# Patient Record
Sex: MALE | Race: WHITE | Employment: FULL TIME | URBAN - METROPOLITAN AREA
[De-identification: names, ages, dates, MRNs, and addresses within clinical notes are randomized per-mention and may not be internally consistent; named-entity substitution may affect disease eponyms.]

---

## 2017-03-27 ENCOUNTER — OP HISTORICAL/CONVERTED ENCOUNTER (OUTPATIENT)
Dept: OTHER | Age: 37
End: 2017-03-27

## 2017-03-28 ENCOUNTER — OFFICE VISIT (OUTPATIENT)
Dept: CARDIOLOGY CLINIC | Age: 37
End: 2017-03-28

## 2017-03-28 VITALS
DIASTOLIC BLOOD PRESSURE: 74 MMHG | SYSTOLIC BLOOD PRESSURE: 130 MMHG | RESPIRATION RATE: 16 BRPM | BODY MASS INDEX: 30.88 KG/M2 | WEIGHT: 228 LBS | HEART RATE: 94 BPM | OXYGEN SATURATION: 98 % | HEIGHT: 72 IN

## 2017-03-28 DIAGNOSIS — R00.2 PALPITATIONS: ICD-10-CM

## 2017-03-28 DIAGNOSIS — Z82.49 FAMILY HISTORY OF CORONARY ARTERY DISEASE: ICD-10-CM

## 2017-03-28 DIAGNOSIS — I49.3 PVC (PREMATURE VENTRICULAR CONTRACTION): ICD-10-CM

## 2017-03-28 DIAGNOSIS — I47.1 SVT (SUPRAVENTRICULAR TACHYCARDIA) (HCC): ICD-10-CM

## 2017-03-28 DIAGNOSIS — Z01.818 PRE-OPERATIVE CLEARANCE: Primary | ICD-10-CM

## 2017-03-28 NOTE — PROGRESS NOTES
HISTORY OF PRESENT ILLNESS  Smith Corrigan is a 43 y.o. male     SUMMARY:   Problem List  Date Reviewed: 3/28/2017          Codes Class Noted    Palpitations ICD-10-CM: R00.2  ICD-9-CM: 785.1  6/10/2016    Overview Signed 6/10/2016  8:22 AM by Clare Pemberton MD     4/16 event monitor, nsr, sinus tach, pacs, pvcs             Paroxysmal atrial fibrillation ([facility] [address] ICD-10-CM: I48.0  ICD-9-CM: 427.31  7/19/2011    Overview Addendum 4/25/2012  1:44 PM by Brandin Romero MD     7/11 converted spontaneously in [facility] er. Normal echo at that time. 4/25/2012: [facility], comprehensive EP study with 3 D mapping under conscious sedation and then general anesthesia  atrial flutter trigerring atrial fibrillation without isuprel were inducible with programmed stimulation at 1 extrastimulus, mapping showed right atrial flutter and ablation at the cavotricuspid isthmus were done. No further atrial flutter/tachycardia, fibrillation were inducible after ablation despite isuprel to wide open amount and burst rapid pacing to 2:1 AV block (240 ms), incremental and programmed atrial stimulation up to 3 extrastimuli to 200 ms at mid, high, low RA, prox CS, mid CS. Distal CS pacing did not capture. Dual AV herberth physiology is noted with one AV herberth echo beats but there was no AVNRT so this was not ablated. Atrial fibrillation ablation was not done because this was not the primary arrhythmia             SVT (supraventricular tachycardia) ICD-10-CM: I47.1  ICD-9-CM: 427.89  7/19/2011    Overview Signed 7/19/2011  4:44 PM by Clare Pemberton MD     approx 2006, with neg ep study and stress test at [facility]. Subsequently treated with inderal.             Family history of coronary artery disease ICD-10-CM: Z82.49  ICD-9-CM: V17.3  7/19/2011              Current Outpatient Prescriptions on File Prior to Visit   Medication Sig    diltiazem (CARDIZEM) 30 mg tablet Take 1 Tab by mouth four (4) times daily as needed.  As needed for palpitations.  clonazePAM (KLONOPIN) 0.5 mg tablet Take 0.5 mg by mouth nightly as needed. Taking one half tablet at night     No current facility-administered medications on file prior to visit. CARDIOLOGY STUDIES TO DATE:  2006 normal stress test at [facility]  7/11 normal echocardiogram at [facility]  5/16 event monitor pvc, pacs, sinus tachycardia      Chief Complaint   Patient presents with    Pre-op Exam     HPI :  Mr. Jing Martinez comes in for pre-op because he needs to have a laparoscopic hernia repair on Friday. EKG today is normal, and he has not had any recent palpitations. He is not taking any of his Cardizem because he has just felt like he has not needed it. Unfortunately he is still smoking. He is walking about a mile and a half just about every day, feels good about that, and he has lost about ten pounds since we saw him last spring. CARDIAC ROS:   negative for chest pain, dyspnea, palpitations, syncope, orthopnea, paroxysmal nocturnal dyspnea, exertional chest pressure/discomfort, claudication, lower extremity edema    Family History   Problem Relation Age of Onset    Heart Disease Father        Past Medical History:   Diagnosis Date    Atrial fibrillation ([facility] [address]     Hernia of unspecified site of abdominal cavity without mention of obstruction or gangrene     repaired    Lipoma     Status post Nissen fundoplication (without gastrostomy tube) procedure        GENERAL ROS:  A comprehensive review of systems was negative except for that written in the HPI.     Visit Vitals    /74 (BP 1 Location: Right arm, BP Patient Position: Sitting)    Pulse 94    Resp 16    Ht 6' (1.829 m)    Wt 228 lb (103.4 kg)    SpO2 98%    BMI 30.92 kg/m2       Wt Readings from Last 3 Encounters:   03/28/17 228 lb (103.4 kg)   06/10/16 236 lb 6.4 oz (107.2 kg)   04/25/16 238 lb (108 kg)            BP Readings from Last 3 Encounters:   03/28/17 130/74   06/10/16 130/80   04/25/16 120/80 PHYSICAL EXAM  General appearance: alert, cooperative, no distress, appears stated age  Neck: supple, symmetrical, trachea midline, no adenopathy, no carotid bruit and no JVD  Lungs: clear to auscultation bilaterally  Heart: regular rate and rhythm, S1, S2 normal, no murmur, click, rub or gallop  Extremities: extremities normal, atraumatic, no cyanosis or edema      ASSESSMENT  Mr. Emre Goel is stable and asymptomatic at this point and should be fine for hernia surgery without special precautions or further cardiac testing. current treatment plan is effective, no change in therapy  lab results and schedule of future lab studies reviewed with patient  reviewed diet, exercise and weight control    Encounter Diagnoses   Name Primary?  Pre-operative clearance Yes    SVT (supraventricular tachycardia)     Palpitations     Family history of coronary artery disease     PVC (premature ventricular contraction)      Orders Placed This Encounter    AMB POC EKG ROUTINE W/ 12 LEADS, INTER & REP       Follow-up Disposition:  Return if symptoms worsen or fail to improve.     Indra Heard MD  3/28/2017

## 2017-03-30 ENCOUNTER — TELEPHONE (OUTPATIENT)
Dept: CARDIOLOGY CLINIC | Age: 37
End: 2017-03-30

## 2017-03-30 NOTE — TELEPHONE ENCOUNTER
Patient's wife walked in requesting patient's surgical clearance. Verified HIPAA. Verified patient's identity with two identifiers. Printed office note, which included the clearance. She denied further questions or concerns.

## 2017-04-13 ENCOUNTER — TELEPHONE (OUTPATIENT)
Dept: CARDIOLOGY CLINIC | Age: 37
End: 2017-04-13

## 2017-04-13 NOTE — TELEPHONE ENCOUNTER
I called # below and they said I have to fax records request to fax # [phone]. I will give them to you once I receive them. Records given to Dr. Lay Ghotra nurse, [employer].

## 2017-04-13 NOTE — TELEPHONE ENCOUNTER
Patient was seen in at [facility]. He had an EKG, ECHO and Stress Test. His wife Mikaela Phillips  stated that she has tried multiple times with no luck. She would like to know if you can call them at  [phone] to get records.  If you need to speak with her she can be reached at [phone]

## 2017-04-13 NOTE — TELEPHONE ENCOUNTER
I sent a message to Imtiaz Edmonds asking if she could pull records. Patient is a patient of Dr. Cyn Ricks and has an appointment with Dr. oNrma Foster tomorrow.     Future Appointments  Date Time Provider Farhat Taisha   4/14/2017 11:20 AM Gareth Lim  [address]

## 2017-04-14 ENCOUNTER — OFFICE VISIT (OUTPATIENT)
Dept: CARDIOLOGY CLINIC | Age: 37
End: 2017-04-14

## 2017-04-14 VITALS
SYSTOLIC BLOOD PRESSURE: 110 MMHG | OXYGEN SATURATION: 96 % | HEIGHT: 72 IN | BODY MASS INDEX: 30.61 KG/M2 | RESPIRATION RATE: 16 BRPM | DIASTOLIC BLOOD PRESSURE: 80 MMHG | HEART RATE: 92 BPM | WEIGHT: 226 LBS

## 2017-04-14 DIAGNOSIS — R00.2 PALPITATIONS: ICD-10-CM

## 2017-04-14 DIAGNOSIS — Z01.812 PRE-PROCEDURE LAB EXAM: ICD-10-CM

## 2017-04-14 DIAGNOSIS — I49.3 PVC (PREMATURE VENTRICULAR CONTRACTION): Primary | ICD-10-CM

## 2017-04-14 RX ORDER — RANITIDINE HCL 75 MG
150 TABLET ORAL 2 TIMES DAILY
COMMUNITY
End: 2018-04-19

## 2017-04-14 NOTE — PATIENT INSTRUCTIONS
Your EP study and PVC ablation procedure has been scheduled for 6/23/17 at 1:00pm, at [facility].    Please report to Admitting Department by 11:00am, or 2 hours prior to your scheduled procedure. Please bring a list of your current medications and medication bottles, if able, to the hospital on this day. You will need to have nothing to eat or drink after midnight, the night prior to your procedure. You may have small sips of water, if needed, to take with your medication. You will need labs drawn prior to your procedure. Please go to [facility] to have this done no sooner than 5/23/17 and no later than 6/19/17. You should not stop your medications prior to your scheduled procedure. After your procedure, you will need to follow up with Dr. Sonia Toribio.  Your follow-up appointment has been scheduled for 7/13/17 at 2:20pm.

## 2017-04-14 NOTE — PROGRESS NOTES
Chief Complaint   Patient presents with    Irregular Heart Beat     Follow up due to recent hospital visit. No further chest pain/shortness of breath. Denies dizziness/swelling.  SVT     Recent ER visit due to chest pain/shortness of breath/elevated heart rate. Discharged on zantac bid. Has not smoked since 4-11-17.

## 2017-04-14 NOTE — MR AVS SNAPSHOT
Visit Information Date & Time Provider Department Dept. Phone Encounter #  
 4/14/2017 11:20 AM Kiana Mendoza MD [facility] [phone] [id number] Your Appointments 7/13/2017  2:20 PM  
ESTABLISHED PATIENT with Kiana Mendoza MD  
[facility] ([facility]) Appt Note: 2 week PVC ablation/EKG needed [address] [place] [address]  
[address] [address] Upcoming Health Maintenance Date Due Pneumococcal 19-64 Medium Risk (1 of 1 - PPSV23) 12/29/1997 DTaP/Tdap/Td series (1 - Tdap) 12/29/1999 INFLUENZA AGE 9 TO ADULT 8/1/2016 Allergies as of 4/14/2017  Review Complete On: 4/14/2017 By: Kiana Mendoza MD  
 No Known Allergies Current Immunizations  Reviewed on 4/26/2012 No immunizations on file. Not reviewed this visit You Were Diagnosed With   
  
 Codes Comments SVT (supraventricular tachycardia) ([facility]    -  Primary ICD-10-CM: I47.1 ICD-9-CM: 427.89 Pre-procedure lab exam     ICD-10-CM: D12.055 ICD-9-CM: V72.63 Vitals BP Pulse Resp Height(growth percentile) Weight(growth percentile) SpO2  
 110/80 (BP 1 Location: Right arm, BP Patient Position: Sitting) 92 16 6' (1.829 m) 226 lb (102.5 kg) 96% BMI Smoking Status 30.65 kg/m2 Former Smoker Vitals History BMI and BSA Data Body Mass Index Body Surface Area  
 30.65 kg/m 2 2.28 m 2 Preferred Pharmacy Pharmacy Name Phone [address] [phone] Your Updated Medication List  
  
   
This list is accurate as of: 4/14/17 12:17 PM.  Always use your most recent med list.  
  
  
  
  
 dilTIAZem 30 mg tablet Commonly known as:  CARDIZEM Take 1 Tab by mouth four (4) times daily as needed. As needed for palpitations. KlonoPIN 0.5 mg tablet Generic drug:  clonazePAM  
Take 0.5 mg by mouth nightly as needed. Taking one half tablet at night. As of 4-14-17, taking every am.  
  
 raNITIdine 75 mg tablet Commonly known as:  ZANTAC Take 75 mg by mouth two (2) times a day. We Performed the Following CBC WITH AUTOMATED DIFF [12124 CPT(R)] METABOLIC PANEL, BASIC [28230 CPT(R)] Patient Instructions Your EP study and PVC ablation procedure has been scheduled for 6/23/17 at 1:00pm, at [address]. 
 
Please report to Admitting Department by 11:00am, or 2 hours prior to your scheduled procedure. Please bring a list of your current medications and medication bottles, if able, to the hospital on this day. You will need to have nothing to eat or drink after midnight, the night prior to your procedure. You may have small sips of water, if needed, to take with your medication. You will need labs drawn prior to your procedure. Please go to [facility] to have this done no sooner than 5/23/17 and no later than 6/19/17. You should not stop your medications prior to your scheduled procedure. After your procedure, you will need to follow up with Dr. Hedda Baumgarten. Your follow-up appointment has been scheduled for 7/13/17 at 2:20pm.  
 
 
 
  
Introducing [facility] SERVICES! Dear Rashaun Roger: Thank you for requesting a [employer] account. Our records indicate that you already have an active Kindling account. You can access your account anytime at [email or web address] Did you know that you can access your hospital and ER discharge instructions at any time in Kindling? You can also review all of your test results from your hospital stay or ER visit. Additional Information If you have questions, please visit the Frequently Asked Questions section of the Kindling website at [email or web address] Remember, Kindling is NOT to be used for urgent needs. For medical emergencies, dial 911. Now available from your iPhone and Android! Please provide this summary of care documentation to your next provider. Your primary care clinician is listed as Kathleene Lanes. If you have any questions after today's visit, please call [phone].

## 2017-04-14 NOTE — LETTER
NOTIFICATION OF RETURN TO WORK / SCHOOL 
 
4/14/2017 12:05 PM 
 
Mr. Inocencia Lowry [address] [address] [place] To Whom It May Concern: 
 
Inocencia Lowry was under the care of [address] He will be able to return to work/school with regular duties and/or activities . If there are questions or concerns please have the patient contact our office.  
 
Sincerely, 
 
 
Corinne Perez MD

## 2017-04-15 NOTE — PROGRESS NOTES
CARDIAC ELECTROPHYSIOLOGY CLINIC    Subjective:      Kamla Guido is a 43 y.o. male who is seen for evaluation of palpitation and PVC  He had loop monitor 4/2016 with PAC and PVC  He had been seen in August 2014  He had rare PVC in the past  Today he said he had felt so good with atrial flutter ablation and did not have problem until PVCs started to bother him  When he went to outside ER and said he saw ventricular bigeminy    He had been using Inderal back in 2005 when he had tachycardia and was studied in EP lab at [facility] and was told there was nothing inducible  He then came to [facility] with AFIB and ECG confirmed that in 7/2011 and echo there showed normal heart with possible mild LAE  He had GERD and 2 surgeries so he did EP study with me  4/25/2012 EP and ablation  1. There was no retrograde VA conduction, no evidence of accessory pathway  2. Normal AV herberth antegrade conduction  3. Dual AV herberth physiology with AV herberth echo single beat was present but there was no AVNRT so this was not necessary to ablate  4. Atrial flutter was present and triggered atrial fibrillation. Mapping showed counterclockwise cavotricuspid isthmus atrial flutter. The isthmus was ablated and bidirectional line of block was obtained.  There was no further inducible arrhythmia (atrial tachycardia, flutter or fibrillation) obtained after ablation despite aggressive pacing protocol (at multiple RA and LA via CS sites) with and without isuprel up to the highest allowable dose, with and without anesthetic agent/sedation      Problem List  Date Reviewed: 4/14/2017          Codes Class Noted    Palpitations ICD-10-CM: R00.2  ICD-9-CM: 785.1  6/10/2016    Overview Signed 6/10/2016  8:22 AM by Ankita Pedro MD     4/16 event monitor, nsr, sinus tach, pacs, pvcs             Paroxysmal [facility] fibrillation ([facility] ICD-10-CM: I48.0  ICD-9-CM: 427.31  7/19/2011    Overview Addendum 4/25/2012  1:44 PM by Joanna Huff MD     7/11 converted spontaneously in [facility] er. Normal echo at that time. 4/25/2012: [facility], comprehensive EP study with 3 D mapping under conscious sedation and then general anesthesia  atrial flutter trigerring atrial fibrillation without isuprel were inducible with programmed stimulation at 1 extrastimulus, mapping showed right atrial flutter and ablation at the cavotricuspid isthmus were done. No further atrial flutter/tachycardia, fibrillation were inducible after ablation despite isuprel to wide open amount and burst rapid pacing to 2:1 AV block (240 ms), incremental and programmed atrial stimulation up to 3 extrastimuli to 200 ms at mid, high, low RA, prox CS, mid CS. Distal CS pacing did not capture. Dual AV herberth physiology is noted with one AV herberth echo beats but there was no AVNRT so this was not ablated. Atrial fibrillation ablation was not done because this was not the primary arrhythmia             SVT (supraventricular tachycardia) ([facility] [address] ICD-10-CM: I47.1  ICD-9-CM: 427.89  7/19/2011    Overview Signed 7/19/2011  4:44 PM by Ankita Pedro MD     approx 2006, with neg ep study and stress test at [facility]. Subsequently treated with inderal.             Family history of coronary artery disease ICD-10-CM: Z82.49  ICD-9-CM: V17.3  7/19/2011              Current Outpatient Prescriptions   Medication Sig Dispense Refill    raNITIdine (ZANTAC) 75 mg tablet Take 75 mg by mouth two (2) times a day.  diltiazem (CARDIZEM) 30 mg tablet Take 1 Tab by mouth four (4) times daily as needed. As needed for palpitations. 360 Tab 0    clonazePAM (KLONOPIN) 0.5 mg tablet Take 0.5 mg by mouth nightly as needed. Taking one half tablet at night.   As of 4-14-17, taking every am.       No Known Allergies  Past Medical History:   Diagnosis Date    Atrial fibrillation ([facility] [address]     Hernia of unspecified site of abdominal cavity without mention of obstruction or gangrene     repaired    Lipoma     Status post Nissen fundoplication (without gastrostomy tube) procedure      Past Surgical History:   Procedure Laterality Date    ABLATION OF SVT  4/25/2012         EP STUDY W/INDUCTION  2005    at [facility]    EP STUDY W/INDUCTION  4/25/2012         ISOPROTERENOL NON-COMP CON  4/25/2012         CT INTRACARDIAC ELECTROPHYSIOLOGIC 3D MAPPING  4/25/2012          No family history of AFIB or arrhythmia, just CAD    Social History   Substance Use Topics    Smoking status: Former Smoker     Packs/day: 0.50     Years: 0.00    Smokeless tobacco: Never Used    Alcohol use No      Review of Systems    Constitutional: Negative for fever, chills, weight loss  HEENT: Negative for nosebleeds,vision changes. Respiratory: Negative for cough, hemoptysis, sputum production, and wheezing. Cardiovascular: Negative for chest pain, orthopnea, claudication, leg swelling, syncope, and PND. Gastrointestinal: Negative for nausea, vomiting, diarrhea, blood in stool and melena. Genitourinary: Negative for dysuria, urgency, frequency and hematuria. Musculoskeletal: Negative for myalgias. Skin: Negative for rash and itching. Heme: Does not bleed or bruise easily. Neurological: Negative for speech change and focal weakness      Objective:     Visit Vitals    /80 (BP 1 Location: Right arm, BP Patient Position: Sitting)    Pulse 92    Resp 16    Ht 6' (1.829 m)    Wt 226 lb (102.5 kg)    SpO2 96%    BMI 30.65 kg/m2      Physical Exam:   General:  alert, cooperative, no distress, appears stated age   Neck: no bruits, no JVD   Chest Wall: respiratory effort normal   Lung: clear to auscultation bilaterally   Heart:  normal rate, regular rhythm, no murmurs, rubs, clicks or gallops   Abdomen:  soft, non-tender   Extremities: No edema       Assessment/Plan:       ICD-10-CM ICD-9-CM    1. PVC (premature ventricular contraction) I49.3 427.69    2.  Pre-procedure lab exam Z01.812 V72.63 CBC WITH AUTOMATED DIFF      METABOLIC PANEL, BASIC 3. Palpitations R00.2 785.1      He had rare PVC on loop monitor in the past  He was recommended to use the cardizem prn for episodes of palpitations. He agrees with plan and did not use cardizem at all but recently he had more frequent episodes of palpitations and went to another hospital and knew he had more frequent PVCs  He showed me normal echo and nuclear stress test and during exercise stress part he had inducible PVCs mentioned in the report  I discussed with him medications again but he refused  He said medications slow him down and prefer benefits of ablation over the risk  He wants to proceed with EP study and ablation of PVC  I discussed with him and his wife about possibility that despite he feels more PVCs lately I cannot induce during procedure and therefore cannot ablate  He is ok with that  Risks include but are not limited to bleeding in the groin, infection in the groin and/or heart valves, fistula between the groin arteries and veins, valvular damage, diaphragmatic paralysis, aortic dissection, heart attack, stroke, blood clot in the leg, pulmonary embolism, lung collapse (pneumothorax or hemothorax), heart collapse (pericardial tamponade), death. The added risks for left sided ablation may be kidney failure (from contrast injection), higher risk of bleeding, stroke and heart attack. Elective or emergency surgery may be required to repair some of these complications. Prolonged hospitalization would be required. General anesthesia and transeptal catheterization may be required for the procedure       Cara Salomon M.D.  [facility]  Electrophysiology/Cardiology  [facility]  [address] [facility], [place] Janelle [address] [place] [address]  [phone]

## 2017-05-24 ENCOUNTER — HOSPITAL ENCOUNTER (EMERGENCY)
Age: 37
Discharge: HOME | End: 2017-05-24
Attending: STUDENT IN AN ORGANIZED HEALTH CARE EDUCATION/TRAINING PROGRAM
Payer: COMMERCIAL

## 2017-05-24 VITALS
WEIGHT: 224 LBS | SYSTOLIC BLOOD PRESSURE: 133 MMHG | HEART RATE: 71 BPM | DIASTOLIC BLOOD PRESSURE: 82 MMHG | OXYGEN SATURATION: 99 % | BODY MASS INDEX: 30.34 KG/M2 | HEIGHT: 72 IN | TEMPERATURE: 98.4 F | RESPIRATION RATE: 17 BRPM

## 2017-05-24 DIAGNOSIS — R00.2 PALPITATIONS: Primary | ICD-10-CM

## 2017-05-24 LAB
ATRIAL RATE: 78 BPM
CALCULATED P AXIS, ECG09: 45 DEGREES
CALCULATED R AXIS, ECG10: 11 DEGREES
CALCULATED T AXIS, ECG11: 17 DEGREES
DIAGNOSIS, 93000: NORMAL
P-R INTERVAL, ECG05: 144 MS
Q-T INTERVAL, ECG07: 380 MS
QRS DURATION, ECG06: 94 MS
QTC CALCULATION (BEZET), ECG08: 433 MS
VENTRICULAR RATE, ECG03: 78 BPM

## 2017-05-24 PROCEDURE — 99284 EMERGENCY DEPT VISIT MOD MDM: CPT

## 2017-05-24 PROCEDURE — 93005 ELECTROCARDIOGRAM TRACING: CPT

## 2017-05-24 NOTE — ED NOTES
Patient calling out stating that he \"feels the palpitations\" at this time. Rhythm strip printed that shows PAC. Patient aware of finding; aware that the strip was printed. Patient updated regarding plan of care; verbalizes understanding and agreement. Patient attached to monitor x4; tolerating well. Vital signs updated. Call bell in reach. Family at bedside. Will continue to monitor.

## 2017-05-24 NOTE — DISCHARGE INSTRUCTIONS
We hope that we have addressed all of your medical concerns. The examination and treatment you received in the Emergency Department were for an emergent problem and were not intended as complete care. It is important that you follow up with your healthcare provider(s) for ongoing care. If your symptoms worsen or do not improve as expected, and you are unable to reach your usual health care provider(s), you should return to the Emergency Department. Today's healthcare is undergoing tremendous change, and patient satisfaction surveys are one of the many tools to assess the quality of medical care. You may receive a survey from the [employer] regarding your experience in the Emergency Department. I hope that your experience has been completely positive, particularly the medical care that I provided. As such, please participate in the survey; anything less than excellent does not meet my expectations or intentions. [facility] and [address] [place] [address] participate in nationally recognized quality of care measures. If your blood pressure is greater than 120/80, as reported below, we urge that you seek medical care to address the potential of high blood pressure, commonly known as hypertension. Hypertension can be hereditary or can be caused by certain medical conditions, pain, stress, or \"white coat syndrome. \"       Please make an appointment with your health care provider(s) for follow up of your Emergency Department visit. VITALS:   Patient Vitals for the past 8 hrs:   Temp Pulse Resp BP SpO2   05/24/17 1400 98.4 °F (36.9 °C) 74 15 124/84 100 %          Thank you for allowing us to provide you with medical care today. We realize that you have many choices for your emergency care needs. Please choose us in the future for any continued health care needs. Mitali Leal, [address] [facility].   Office: [phone]            No results found for this or any previous visit (from the past 24 hour(s)). No results found. Palpitations: Care Instructions  Your Care Instructions    Heart palpitations are the uncomfortable sensation that your heart is beating fast or irregularly. You might feel pounding or fluttering in your chest. It might feel like your heart is skipping a beat. Although palpitations may be caused by a heart problem, they also occur because of stress, fatigue, or use of alcohol, caffeine, or nicotine. Many medicines, including diet pills, antihistamines, decongestants, and some herbal products, can cause heart palpitations. Nearly everyone has palpitations from time to time. Depending on your symptoms, your doctor may need to do more tests to try to find the cause of your palpitations. Follow-up care is a key part of your treatment and safety. Be sure to make and go to all appointments, and call your doctor if you are having problems. It's also a good idea to know your test results and keep a list of the medicines you take. How can you care for yourself at home? · Avoid caffeine, nicotine, and excess alcohol. · Do not take illegal drugs, such as methamphetamines and cocaine. · Do not take weight loss or diet medicines unless you talk with your doctor first.  · Get plenty of sleep. · Do not overeat. · If you have palpitations again, take deep breaths and try to relax. This may slow a racing heart. · If you start to feel lightheaded, lie down to avoid injuries that might result if you pass out and fall down. · Keep a record of your palpitations and bring it to your next doctor's appointment. Write down:  ¨ The date and time. ¨ Your pulse. (If your heart is beating fast, it may be hard to count your pulse.)  ¨ What you were doing when the palpitations started. ¨ How long the palpitations lasted. ¨ Any other symptoms. · If an activity causes palpitations, slow down or stop.  Talk to your doctor before you do that activity again. · Take your medicines exactly as prescribed. Call your doctor if you think you are having a problem with your medicine. When should you call for help? Call 911 anytime you think you may need emergency care. For example, call if:  · You passed out (lost consciousness). · You have symptoms of a heart attack. These may include:  ¨ Chest pain or pressure, or a strange feeling in the chest.  ¨ Sweating. ¨ Shortness of breath. ¨ Pain, pressure, or a strange feeling in the back, neck, jaw, or upper belly or in one or both shoulders or arms. ¨ Lightheadedness or sudden weakness. ¨ A fast or irregular heartbeat. After you call 911, the  may tell you to chew 1 adult-strength or 2 to 4 low-dose aspirin. Wait for an ambulance. Do not try to drive yourself. · You have symptoms of a stroke. These may include:  ¨ Sudden numbness, tingling, weakness, or loss of movement in your face, arm, or leg, especially on only one side of your body. ¨ Sudden vision changes. ¨ Sudden trouble speaking. ¨ Sudden confusion or trouble understanding simple statements. ¨ Sudden problems with walking or balance. ¨ A sudden, severe headache that is different from past headaches. Call your doctor now or seek immediate medical care if:  · You have heart palpitations and:  ¨ Are dizzy or lightheaded, or you feel like you may faint. ¨ Have new or increased shortness of breath. Watch closely for changes in your health, and be sure to contact your doctor if:  · You continue to have heart palpitations. Where can you learn more? Go to http://barbara-silvestre.info/. Enter R508 in the search box to learn more about \"Palpitations: Care Instructions. \"  Current as of: January 27, 2016  Content Version: 11.2  © 0208-8675 [employer]. Care instructions adapted under license by [employer] (which disclaims liability or warranty for this information).  If you have questions about a medical condition or this instruction, always ask your healthcare professional. [place] [address] any warranty or liability for your use of this information.

## 2017-05-24 NOTE — ED NOTES
The patient was discharged home by Dr. Jessie Mason in stable condition. The patient is alert and oriented, in no respiratory distress and discharge vital signs obtained. The patient's diagnosis, condition and treatment were explained. The patient expressed understanding. No prescriptions given. No work/school note given. A discharge plan has been developed. A  was not involved in the process. Aftercare instructions were given. Pt ambulatory out of the ED with spouse.

## 2017-05-24 NOTE — ED PROVIDER NOTES
HPI Comments: Patient is a 58-year-old male with a history of cardiac ablation in 2012 due to a flutter in A. fib, PVCs, PACs, on p.r.n. diltiazem who presents to the ED with a chief complaint of heart palpitations. It is here in [place] doing preop blood work for a scheduled EP study on June 23, 2017. He states last night he had an episode of palpitations and these have continued today, worsened and exacerbated by any type of movement or exertion. He is asymptomatic when at rest. He took one dose of diltiazem at 12:30 PM today. He denies chest pain, shortness of breath, fever, nausea, vomiting, abdominal pain. No other complaints at this time. The history is provided by the patient. No  was used. Past Medical History:   Diagnosis Date    Atrial fibrillation (HCC)     Hernia of unspecified site of abdominal cavity without mention of obstruction or gangrene     repaired    Lipoma     Status post Nissen fundoplication (without gastrostomy tube) procedure        Past Surgical History:   Procedure Laterality Date    ABLATION OF SVT  4/25/2012         EP ST[facility]    EP STUDY W/INDUCTION  4/25/2012         HX HERNIA REPAIR      ISOPROTERENOL NON-COMP CON  4/25/2012         HI INTRACARDIAC ELECTROPHYSIOLOGIC 3D MAPPING  4/25/2012              Family History:   Problem Relation Age of Onset    Heart Disease Father        Social History     Social History    Marital status:      Spouse name: N/A    Number of children: N/A    Years of education: N/A     Occupational History    Not on file. Social History Main Topics    Smoking status: Former Smoker     Packs/day: 0.50     Years: 0.00    Smokeless tobacco: Never Used    Alcohol use No    Drug use: No    Sexual activity: Not on file     Other Topics Concern    Not on file     Social History Narrative         ALLERGIES: Review of patient's allergies indicates no known allergies.     Review of Systems   Respiratory: Negative for shortness of breath. Cardiovascular: Positive for palpitations. Negative for chest pain and leg swelling. All other systems reviewed and are negative. Vitals:    05/24/17 1400   BP: 124/84   Pulse: 74   Resp: 15   Temp: 98.4 °F (36.9 °C)   SpO2: 100%   Weight: 101.6 kg (224 lb)   Height: 6' (1.829 m)            Physical Exam   Constitutional: He is oriented to person, place, and time. He appears well-developed and well-nourished. No distress. HENT:   Head: Normocephalic and atraumatic. Eyes: Conjunctivae and EOM are normal.   Neck: Normal range of motion. Cardiovascular: Normal rate and normal heart sounds. No murmur heard. Pulmonary/Chest: Effort normal and breath sounds normal. No respiratory distress. He has no wheezes. Abdominal: Soft. Bowel sounds are normal. He exhibits no distension. There is no tenderness. There is no rebound. Musculoskeletal: Normal range of motion. He exhibits no edema or tenderness. Neurological: He is alert and oriented to person, place, and time. No cranial nerve deficit. He exhibits normal muscle tone. Coordination normal.   Skin: Skin is warm and dry. He is not diaphoretic. Nursing note and vitals reviewed. [facility]  ED Course       Procedures    EKG at 1357 shows normal sinus rhythm with sinus arrhythmia, heart rate 78 beats per minute, no STEMI, no ischemia, normal axis, normal intervals.

## 2017-05-25 LAB
BASOPHILS # BLD AUTO: 0 X10E3/UL (ref 0–0.2)
BASOPHILS NFR BLD AUTO: 1 %
BUN SERPL-MCNC: 10 MG/DL (ref 6–20)
BUN/CREAT SERPL: 10 (ref 9–20)
CALCIUM SERPL-MCNC: 9.3 MG/DL (ref 8.7–10.2)
CHLORIDE SERPL-SCNC: 100 MMOL/L (ref 96–106)
CO2 SERPL-SCNC: 23 MMOL/L (ref 18–29)
CREAT SERPL-MCNC: 0.99 MG/DL (ref 0.76–1.27)
EOSINOPHIL # BLD AUTO: 0.1 X10E3/UL (ref 0–0.4)
EOSINOPHIL NFR BLD AUTO: 2 %
ERYTHROCYTE [DISTWIDTH] IN BLOOD BY AUTOMATED COUNT: 13.2 % (ref 12.3–15.4)
GLUCOSE SERPL-MCNC: 207 MG/DL (ref 65–99)
HCT VFR BLD AUTO: 46 % (ref 37.5–51)
HGB BLD-MCNC: 15.8 G/DL (ref 12.6–17.7)
IMM GRANULOCYTES # BLD: 0.1 X10E3/UL (ref 0–0.1)
IMM GRANULOCYTES NFR BLD: 1 %
LYMPHOCYTES # BLD AUTO: 2.3 X10E3/UL (ref 0.7–3.1)
LYMPHOCYTES NFR BLD AUTO: 29 %
MCH RBC QN AUTO: 31.1 PG (ref 26.6–33)
MCHC RBC AUTO-ENTMCNC: 34.3 G/DL (ref 31.5–35.7)
MCV RBC AUTO: 91 FL (ref 79–97)
MONOCYTES # BLD AUTO: 0.3 X10E3/UL (ref 0.1–0.9)
MONOCYTES NFR BLD AUTO: 4 %
NEUTROPHILS # BLD AUTO: 5.1 X10E3/UL (ref 1.4–7)
NEUTROPHILS NFR BLD AUTO: 63 %
PLATELET # BLD AUTO: 242 X10E3/UL (ref 150–379)
POTASSIUM SERPL-SCNC: 4.7 MMOL/L (ref 3.5–5.2)
RBC # BLD AUTO: 5.08 X10E6/UL (ref 4.14–5.8)
SODIUM SERPL-SCNC: 140 MMOL/L (ref 134–144)
WBC # BLD AUTO: 8 X10E3/UL (ref 3.4–10.8)

## 2017-06-16 RX ORDER — SODIUM CHLORIDE 0.9 % (FLUSH) 0.9 %
5-10 SYRINGE (ML) INJECTION AS NEEDED
Status: CANCELLED | OUTPATIENT
Start: 2017-06-16

## 2017-06-16 RX ORDER — SODIUM CHLORIDE 0.9 % (FLUSH) 0.9 %
5-10 SYRINGE (ML) INJECTION EVERY 8 HOURS
Status: CANCELLED | OUTPATIENT
Start: 2017-06-16

## 2017-06-23 ENCOUNTER — HOSPITAL ENCOUNTER (OUTPATIENT)
Dept: NON INVASIVE DIAGNOSTICS | Age: 37
Setting detail: OBSERVATION
Discharge: HOME | End: 2017-06-24
Attending: INTERNAL MEDICINE | Admitting: INTERNAL MEDICINE
Payer: COMMERCIAL

## 2017-06-23 PROCEDURE — 74011000250 HC RX REV CODE- 250: Performed by: INTERNAL MEDICINE

## 2017-06-23 PROCEDURE — 93613 INTRACARDIAC EPHYS 3D MAPG: CPT

## 2017-06-23 PROCEDURE — 77030018729 HC ELECTRD DEFIB PAD CARD -B

## 2017-06-23 PROCEDURE — 74011250637 HC RX REV CODE- 250/637: Performed by: NURSE PRACTITIONER

## 2017-06-23 PROCEDURE — 74011250636 HC RX REV CODE- 250/636: Performed by: INTERNAL MEDICINE

## 2017-06-23 PROCEDURE — 99218 HC RM OBSERVATION: CPT

## 2017-06-23 PROCEDURE — C1894 INTRO/SHEATH, NON-LASER: HCPCS

## 2017-06-23 PROCEDURE — 77030030806 HC PTCH ENSIT NAVX STJU -G

## 2017-06-23 PROCEDURE — 77030016894 HC CBL EP DX CATH3 STJU -B

## 2017-06-23 PROCEDURE — 77030016899 HC CBL EP EXT4 BSC -B

## 2017-06-23 PROCEDURE — C1730 CATH, EP, 19 OR FEW ELECT: HCPCS

## 2017-06-23 PROCEDURE — 77030010869 HC CBL EP ABL STJU -B

## 2017-06-23 PROCEDURE — 74011250637 HC RX REV CODE- 250/637: Performed by: INTERNAL MEDICINE

## 2017-06-23 PROCEDURE — 99152 MOD SED SAME PHYS/QHP 5/>YRS: CPT

## 2017-06-23 PROCEDURE — 99153 MOD SED SAME PHYS/QHP EA: CPT

## 2017-06-23 PROCEDURE — 93620 COMP EP EVL R AT VEN PAC&REC: CPT

## 2017-06-23 RX ORDER — ACETAMINOPHEN 325 MG/1
650 TABLET ORAL
Status: DISCONTINUED | OUTPATIENT
Start: 2017-06-23 | End: 2017-06-24 | Stop reason: HOSPADM

## 2017-06-23 RX ORDER — DIPHENHYDRAMINE HYDROCHLORIDE 50 MG/ML
50 INJECTION, SOLUTION INTRAMUSCULAR; INTRAVENOUS ONCE
Status: COMPLETED | OUTPATIENT
Start: 2017-06-23 | End: 2017-06-23

## 2017-06-23 RX ORDER — SODIUM CHLORIDE 0.9 % (FLUSH) 0.9 %
5 SYRINGE (ML) INJECTION AS NEEDED
Status: DISCONTINUED | OUTPATIENT
Start: 2017-06-23 | End: 2017-06-24 | Stop reason: HOSPADM

## 2017-06-23 RX ORDER — SODIUM CHLORIDE 0.9 % (FLUSH) 0.9 %
5-10 SYRINGE (ML) INJECTION EVERY 8 HOURS
Status: DISCONTINUED | OUTPATIENT
Start: 2017-06-23 | End: 2017-06-24

## 2017-06-23 RX ORDER — FENTANYL CITRATE 50 UG/ML
25-200 INJECTION, SOLUTION INTRAMUSCULAR; INTRAVENOUS
Status: DISCONTINUED | OUTPATIENT
Start: 2017-06-23 | End: 2017-06-23

## 2017-06-23 RX ORDER — MIDAZOLAM HYDROCHLORIDE 1 MG/ML
.5-1 INJECTION, SOLUTION INTRAMUSCULAR; INTRAVENOUS
Status: DISCONTINUED | OUTPATIENT
Start: 2017-06-23 | End: 2017-06-23

## 2017-06-23 RX ORDER — SODIUM CHLORIDE 0.9 % (FLUSH) 0.9 %
5-10 SYRINGE (ML) INJECTION AS NEEDED
Status: DISCONTINUED | OUTPATIENT
Start: 2017-06-23 | End: 2017-06-24 | Stop reason: HOSPADM

## 2017-06-23 RX ORDER — METOPROLOL SUCCINATE 50 MG/1
50 TABLET, EXTENDED RELEASE ORAL DAILY
Status: DISCONTINUED | OUTPATIENT
Start: 2017-06-23 | End: 2017-06-24 | Stop reason: HOSPADM

## 2017-06-23 RX ORDER — SODIUM CHLORIDE 0.9 % (FLUSH) 0.9 %
5-10 SYRINGE (ML) INJECTION EVERY 8 HOURS
Status: DISCONTINUED | OUTPATIENT
Start: 2017-06-23 | End: 2017-06-24 | Stop reason: HOSPADM

## 2017-06-23 RX ORDER — HEPARIN SODIUM 1000 [USP'U]/ML
5000 INJECTION, SOLUTION INTRAVENOUS; SUBCUTANEOUS
Status: DISCONTINUED | OUTPATIENT
Start: 2017-06-23 | End: 2017-06-23

## 2017-06-23 RX ORDER — ATROPINE SULFATE 0.1 MG/ML
0.5 INJECTION INTRAVENOUS AS NEEDED
Status: DISCONTINUED | OUTPATIENT
Start: 2017-06-23 | End: 2017-06-23

## 2017-06-23 RX ORDER — ADENOSINE 3 MG/ML
6-24 INJECTION, SOLUTION INTRAVENOUS
Status: DISCONTINUED | OUTPATIENT
Start: 2017-06-23 | End: 2017-06-23

## 2017-06-23 RX ORDER — NALOXONE HYDROCHLORIDE 0.4 MG/ML
0.4 INJECTION, SOLUTION INTRAMUSCULAR; INTRAVENOUS; SUBCUTANEOUS AS NEEDED
Status: DISCONTINUED | OUTPATIENT
Start: 2017-06-23 | End: 2017-06-24 | Stop reason: HOSPADM

## 2017-06-23 RX ORDER — FAMOTIDINE 20 MG/1
20 TABLET, FILM COATED ORAL 2 TIMES DAILY
Status: DISCONTINUED | OUTPATIENT
Start: 2017-06-23 | End: 2017-06-24 | Stop reason: HOSPADM

## 2017-06-23 RX ORDER — HEPARIN SODIUM 200 [USP'U]/100ML
1000 INJECTION, SOLUTION INTRAVENOUS CONTINUOUS
Status: DISCONTINUED | OUTPATIENT
Start: 2017-06-23 | End: 2017-06-23

## 2017-06-23 RX ORDER — LIDOCAINE HYDROCHLORIDE 10 MG/ML
10-30 INJECTION INFILTRATION; PERINEURAL ONCE
Status: COMPLETED | OUTPATIENT
Start: 2017-06-23 | End: 2017-06-23

## 2017-06-23 RX ORDER — ZOLPIDEM TARTRATE 5 MG/1
5 TABLET ORAL
Status: DISCONTINUED | OUTPATIENT
Start: 2017-06-23 | End: 2017-06-24 | Stop reason: HOSPADM

## 2017-06-23 RX ORDER — CLONAZEPAM 0.5 MG/1
0.5 TABLET ORAL
Status: DISCONTINUED | OUTPATIENT
Start: 2017-06-23 | End: 2017-06-24 | Stop reason: HOSPADM

## 2017-06-23 RX ORDER — HYDROCODONE BITARTRATE AND ACETAMINOPHEN 5; 325 MG/1; MG/1
1 TABLET ORAL
Status: DISCONTINUED | OUTPATIENT
Start: 2017-06-23 | End: 2017-06-24 | Stop reason: HOSPADM

## 2017-06-23 RX ADMIN — MIDAZOLAM HYDROCHLORIDE 1 MG: 1 INJECTION, SOLUTION INTRAMUSCULAR; INTRAVENOUS at 16:30

## 2017-06-23 RX ADMIN — FENTANYL CITRATE 50 MCG: 50 INJECTION, SOLUTION INTRAMUSCULAR; INTRAVENOUS at 16:20

## 2017-06-23 RX ADMIN — MIDAZOLAM HYDROCHLORIDE 2 MG: 1 INJECTION, SOLUTION INTRAMUSCULAR; INTRAVENOUS at 16:55

## 2017-06-23 RX ADMIN — FAMOTIDINE 20 MG: 20 TABLET ORAL at 18:44

## 2017-06-23 RX ADMIN — SODIUM CHLORIDE 2 MCG/MIN: 900 INJECTION, SOLUTION INTRAVENOUS at 16:51

## 2017-06-23 RX ADMIN — Medication 10 ML: at 16:44

## 2017-06-23 RX ADMIN — FENTANYL CITRATE 50 MCG: 50 INJECTION, SOLUTION INTRAMUSCULAR; INTRAVENOUS at 16:39

## 2017-06-23 RX ADMIN — LIDOCAINE HYDROCHLORIDE 30 ML: 10 INJECTION, SOLUTION INFILTRATION; PERINEURAL at 16:31

## 2017-06-23 RX ADMIN — HEPARIN SODIUM IN SODIUM CHLORIDE 2000 UNITS: 200 INJECTION INTRAVENOUS at 16:32

## 2017-06-23 RX ADMIN — MIDAZOLAM HYDROCHLORIDE 2 MG: 1 INJECTION, SOLUTION INTRAMUSCULAR; INTRAVENOUS at 16:20

## 2017-06-23 RX ADMIN — Medication 10 ML: at 21:56

## 2017-06-23 RX ADMIN — METOPROLOL SUCCINATE 50 MG: 50 TABLET, EXTENDED RELEASE ORAL at 18:44

## 2017-06-23 RX ADMIN — DIPHENHYDRAMINE HYDROCHLORIDE 50 MG: 50 INJECTION, SOLUTION INTRAMUSCULAR; INTRAVENOUS at 16:43

## 2017-06-23 RX ADMIN — Medication 10 ML: at 16:47

## 2017-06-23 RX ADMIN — MIDAZOLAM HYDROCHLORIDE 2 MG: 1 INJECTION, SOLUTION INTRAMUSCULAR; INTRAVENOUS at 16:38

## 2017-06-23 NOTE — IP AVS SNAPSHOT
Current Discharge Medication List  
  
START taking these medications Dose & Instructions Dispensing Information Comments Morning Noon Evening Bedtime  
 metoprolol succinate 25 mg XL tablet Commonly known as:  TOPROL-XL Your last dose was: Your next dose is:    
   
   
 Dose:  25 mg Take 1 Tab by mouth daily. Indications: PVC Quantity:  90 Tab Refills:  3 CONTINUE these medications which have NOT CHANGED Dose & Instructions Dispensing Information Comments Morning Noon Evening Bedtime KlonoPIN 0.5 mg tablet Generic drug:  clonazePAM  
   
Your last dose was: Your next dose is:    
   
   
 Dose:  0.5 mg Take 0.5 mg by mouth nightly as needed. Taking one half tablet at night. As of 4-14-17, taking every am.  
 Refills:  0  
     
   
   
   
  
 raNITIdine 75 mg tablet Commonly known as:  ZANTAC Your last dose was: Your next dose is:    
   
   
 Dose:  150 mg Take 150 mg by mouth two (2) times a day. Refills:  0 STOP taking these medications   
 dilTIAZem 30 mg tablet Commonly known as:  CARDIZEM Where to Get Your Medications Information on where to get these meds will be given to you by the nurse or doctor. ! Ask your nurse or doctor about these medications  
  metoprolol succinate 25 mg XL tablet

## 2017-06-23 NOTE — IP AVS SNAPSHOT
[address] [place] [address] 
[phone] Patient: Nhi Rock MRN: [id number] :1980 You are allergic to the following No active allergies Recent Documentation Height Weight BMI Smoking Status 1.829 m 102.8 kg 30.74 kg/m2 Former Smoker Emergency Contacts Name Discharge Info Relation Home Work Mobile Anatoly Cortes CAREGIVER [3] Spouse [3] [phone] About your hospitalization You were admitted on:  2017 You last received care in the:  [facility] CV SERVICES UNIT You were discharged on:  2017 Unit phone number:  [phone] Why you were hospitalized Your primary diagnosis was:  Palpitations Your diagnoses also included:  Symptomatic Pvcs Providers Seen During Your Hospitalizations Provider Role Specialty Primary office phone Linda Lawrence MD Attending Provider Cardiology [phone] Your Primary Care Physician (PCP) Primary Care Physician Office Phone Office Fax Nishi Thomason [phone] Follow-up Information Follow up With Details Comments Contact Info Linda Lawrence MD On 2017 220 pm [address] [facility] 
[phone] Aury Gramajo MD   [address] [address] 
[phone] Your Appointments   2:20 PM EDT  
ESTABLISHED PATIENT with Linda Lawrence MD  
[facility] ([facility]) [address] [place] [address]  [address] [facility]  
[phone] Current Discharge Medication List  
  
START taking these medications Dose & Instructions Dispensing Information Comments Morning Noon Evening Bedtime  
 metoprolol succinate 25 mg XL tablet Commonly known as:  TOPROL-XL Your last dose was: Your next dose is: Dose:  25 mg Take 1 Tab by mouth daily. Indications: PVC Quantity:  90 Tab Refills:  3 CONTINUE these medications which have NOT CHANGED Dose & Instructions Dispensing Information Comments Morning Noon Evening Bedtime KlonoPIN 0.5 mg tablet Generic drug:  clonazePAM  
   
Your last dose was: Your next dose is:    
   
   
 Dose:  0.5 mg Take 0.5 mg by mouth nightly as needed. Taking one half tablet at night. As of 4-14-17, taking every am.  
 Refills:  0  
     
   
   
   
  
 raNITIdine 75 mg tablet Commonly known as:  ZANTAC Your last dose was: Your next dose is:    
   
   
 Dose:  150 mg Take 150 mg by mouth two (2) times a day. Refills:  0 STOP taking these medications   
 dilTIAZem 30 mg tablet Commonly known as:  CARDIZEM Where to Get Your Medications Information on where to get these meds will be given to you by the nurse or doctor. ! Ask your nurse or doctor about these medications  
  metoprolol succinate 25 mg XL tablet Discharge Instructions Patient Instructions Post-EP study and ablation 1. No heavy lifting or exercises for 1 week. This includes the following: Do not push or move furniture, jog or run 2. Do not drive for 3 days. 3.  Call Dr. Kaiser Joy at [phone] if you experience any of the following symptoms: 
Dizziness, lightheadedness, fainting spells Lack of energy Shortness of breath Rapid heart rate Chest or muscle twitches Blurry vision, double vision, weakness, numbness Nausea, vomiting Fever Bleeding in the stool, black stool Leg swelling, pain 4. Follow-up with Dr. Kaiser Joy Future Appointments Date Time Provider Farhat Sumner 7/13/2017 2:20 PM Marylee Carver,  [address] May cancel if there is no complication from procedure and toprol control symptoms Then please call [phone] to make new appt in 3-6 months Discharge Orders None V[employer]ter Announcement We are excited to announce that we are making your provider's discharge notes available to you in Voxxter. You will see these notes when they are completed and signed by the physician that discharged you from your recent hospital stay. If you have any questions or concerns about any information you see in V[employer]ter, please call the Health Information Department where you were seen or reach out to your Primary Care Provider for more information about your plan of care. Introducing [facility] & HE[facility]! Dear Lata Morataya: Thank you for requesting a Voxxter account. Our records indicate that you already have an active V[employer]ter account. You can access your account anytime at [email or web address] Did you know that you can access your hospital and ER discharge instructions at any time in Voxxter? You can also review all of your test results from your hospital stay or ER visit. Additional Information If you have questions, please visit the Frequently Asked Questions section of the Voxxter website at [email or web address] Remember, Voxxter is NOT to be used for urgent needs. For medical emergencies, dial 911. Now available from your iPhone and Android! General Information Please provide this summary of care documentation to your next provider. Patient Signature:  ____________________________________________________________ Date:  ____________________________________________________________  
  
Janiya Hodan Provider Signature:  ____________________________________________________________ Date:  ____________________________________________________________

## 2017-06-23 NOTE — DISCHARGE INSTRUCTIONS
Patient Instructions Post-EP study and ablation    1. No heavy lifting or exercises for 1 week. This includes the following:  Do not push or move furniture, jog or run    2. Do not drive for 3 days. 3.  Call Dr. Kaiser Joy at [phone] if you experience any of the following symptoms:  Dizziness, lightheadedness, fainting spells  Lack of energy  Shortness of breath  Rapid heart rate  Chest or muscle twitches  Blurry vision, double vision, weakness, numbness  Nausea, vomiting  Fever  Bleeding in the stool, black stool  Leg swelling, pain    4.   Follow-up with Dr. [facility]  Date Time Provider Farhat Sumner   7/13/2017 2:20 PM Marylee Carver,  [address]     May cancel if there is no complication from procedure and toprol control symptoms  Then please call [phone] to make new appt in 3-6 months

## 2017-06-23 NOTE — PROGRESS NOTES
TRANSFER - OUT REPORT:    Verbal report given to TERE Collazo on Dede Grant being transferred to [facility] for routine progression of care       Report consisted of patients Situation, Background, Assessment and   Recommendations(SBAR). Information from the following report(s) Procedure Summary, MAR and Recent Results was reviewed with the receiving nurse. Opportunity for questions and clarification was provided.

## 2017-06-23 NOTE — PROGRESS NOTES
Cardiac Cath Lab Procedure Area Arrival Note:    Iliana Shelby arrived to Cardiac Cath Lab, Procedure Area. Patient identifiers verified with NAME and DATE OF BIRTH. Procedure verified with patient. Consent forms verified. Allergies verified. Patient informed of procedure and plan of care. Questions answered with review. Patient voiced understanding of procedure and plan of care. Patient on cardiac monitor, non-invasive blood pressure, SPO2 monitor. On RA, placed on O2 @ 2 lpm via nc. IV of ns on pump at 25 ml/hr. Patient status doing well without problems. Patient is A&Ox 3. Patient reports no pain. Patient medicated during procedure with orders obtained and verified by Dr. Anastacio Norris. Refer to patients Cardiac Cath Lab PROCEDURE REPORT for vital signs, assessment, status, and response during procedure, printed at end of case. Printed report on chart or scanned into chart.

## 2017-06-23 NOTE — PROGRESS NOTES
Cardiac Cath Lab Recovery Arrival Note:      Dede Grant arrived to Cardiac Cath Lab, Recovery Area. Staff introduced to patient. Patient identifiers verified with NAME and DATE OF BIRTH. Procedure verified with patient. Consent forms reviewed and signed by patient or authorized representative and verified. Allergies verified. Patient informed of procedure and plan of care. Questions answered with review. Patient prepped for procedure, per orders from physician, prior to arrival.    Patient on cardiac monitor, non-invasive blood pressure, SPO2 monitor. Patient is A&Ox 4. Patient reports no pain, no chest pain, no n/v. Patient in stretcher, in low position, with side rails up, call bell within reach, patient instructed to call of assistance as needed. Patient prep in: [facility] Recovery Area, Bed# 4. Family in: Wife: Vamsi Petty [phone].    Prep by: Jackelyn Cotter RN

## 2017-06-23 NOTE — PROGRESS NOTES
Glucose is high  Check Hb A1c  Check TSH for hyperthyroid    Patient lives far away and procedure is completed late so he requested to be observed overnight in case there is groin bleeding  There is no hospital near where he lives

## 2017-06-23 NOTE — PROGRESS NOTES
TRANSFER - OUT REPORT:    Verbal report given to Breanne Cortes on Ruddy Butterfield being transferred to  for routine progression of care       Report consisted of patients Situation, Background, Assessment and   Recommendations(SBAR). Information from the following report(s) SBAR, Procedure Summary and MAR was reviewed with the receiving nurse. Opportunity for questions and clarification was provided.

## 2017-06-23 NOTE — PROGRESS NOTES
TRANSFER - IN REPORT:    Verbal report received from TOLU Wolfe RN on [address]/[facility], Procedure EPS , from the Cardiac Cath lab, for routine progression of care. Report consisted of patients Situation, Background, Assessment and Recommendations(SBAR). Information from the following report(s) Procedure Summary, MAR and Recent Results was reviewed with the receiving clinician. Opportunity for questions and clarification was provided. Assessment completed upon patients arrival to [address] [place] [address] and care assumed. Cardiac Cath Lab Recovery Arrival Note:     [address]/[facility] arrived to [facility] recovery area. Patient procedure= EPS. Patient on cardiac monitor, non-invasive blood pressure, Patient status doing well without problems. Patient is A&Ox 4. Patient reports no pain, no chest pain, no n/v. Procedure site without any bleeding and no hematoma.

## 2017-06-23 NOTE — H&P
Cardiac Electrophysiology H/P Note     Subjective:      Mónica Curtis is a 43 y.o. patient who is seen for PVC ablation   He said he has had more irregular beats that he thinks PVCs  This is similar to what he had at outside hospital where records mentioned normal echo and stress test but he had PVCs with stress exercise  He had tried cardizem but said it slowed him down and made him tired so he wants PVC ablation  In May he was in ER here with palpitations but ECG then reported sinus arrhythmia  He believed his palpitation last night was sustained and that he was in atrial fibrillation  He had loop monitor 4/2016 with PAC and PVC  I had done atrial flutter ablation in the past for him and he did well afterwards and did not have problem until PVCs started to bother him  He had been using Inderal back in 2005 when he had tachycardia and was studied in EP lab at [facility] and was told there was nothing inducible  He then came to [facility] with AFIB and ECG confirmed that in 7/2011 and echo there showed normal heart with possible mild LAE  He had GERD and 2 surgeries   4/25/2012 EP and ablation  1. There was no retrograde VA conduction, no evidence of accessory pathway  2. Normal AV herberth antegrade conduction  3. Dual AV herberth physiology with AV herberth echo single beat was present but there was no AVNRT so this was not necessary to ablate  4. Atrial flutter was present and triggered atrial fibrillation. Mapping showed counterclockwise cavotricuspid isthmus atrial flutter. The isthmus was ablated and bidirectional line of block was obtained.  There was no further inducible arrhythmia (atrial tachycardia, flutter or fibrillation) obtained after ablation despite aggressive pacing protocol (at multiple RA and LA via CS sites) with and without isuprel up to the highest allowable dose, with and without anesthetic agent/sedation       Patient Active Problem List   Diagnosis Code    Paroxysmal atrial fibrillation ([facility] [address] I48.0    SVT (supraventricular tachycardia) (HCC) I47.1    Family history of coronary artery disease Z82.49    Palpitations R00.2       No Known Allergies  Past Medical History:   Diagnosis Date    Atrial fibrillation (HCC)     Hernia of unspecified site of abdominal cavity without mention of obstruction or gangrene     repaired    Lipoma     Status post Nissen fundoplication (without gastrostomy tube) procedure      Past Surgical History:   Procedure Laterality Date    ABLATION OF SVT  4/25/2012         EP STUD[facility] W[facility]INDUCTION  [facility]    EP STUDY W/INDUCTION  4/25/2012         HX HERNIA REPAIR      ISOPROTERENOL NON-COMP CON  4/25/2012         [place] INTRACARDIAC ELECTROPHYSIOLOGIC 3D MAPPING  4/25/2012          Family History   Problem Relation Age of Onset    Heart Disease Father      Social History   Substance Use Topics    Smoking status: Former Smoker     Packs/day: 0.50     Years: 0.00    Smokeless tobacco: Never Used    Alcohol use No        Review of Systems:   Constitutional: Negative for fever, chills, weight loss, malaise/fatigue. HEENT: Negative for nosebleeds, vision changes. Respiratory: Negative for cough, hemoptysis  Cardiovascular: Negative for chest pain, orthopnea, claudication, leg swelling, syncope, and PND. Gastrointestinal: Negative for nausea, vomiting, diarrhea, blood in stool and melena. Genitourinary: Negative for dysuria, and hematuria. Musculoskeletal: Negative for myalgias, arthralgia. Skin: Negative for rash. Heme: Does not bleed or bruise easily. Neurological: Negative for speech change and focal weakness     Objective:        Physical Exam:   Constitutional: well-developed and well-nourished. No respiratory distress. Head: Normocephalic and atraumatic. Eyes: Pupils are equal, round  ENT: hearing normal  Neck: supple. No JVD present. Cardiovascular: Normal rate, regular rhythm. Exam reveals no gallop and no friction rub.  No murmur heard.  Pulmonary/Chest: Effort normal and breath sounds normal. No wheezes. Abdominal: Soft, no tenderness. Musculoskeletal: no edema. Neurological: alert,oriented. Skin: Skin is warm and dry  Psychiatric: normal mood and affect. Behavior is normal. Judgment and thought content normal.         Assessment/Plan:   Symptomatic PVC, atrial fibrillation  He had atrial flutter ablation before in 2012 and he had dual av node physiology but no AVNRT induced then    He has had recurrent palpitation this year and wants ablation  He wants ablation of PVC first.  I had discussed whether he would want to wait for PVC and PAF ablation and continue to monitor for AFIB burden  He said he has more PVC than PAF and would rather not take medication or wait and wants PVC ablation first today  I suggested a full EP study again today but for PVC mapping I need to use conscious sedation and cannot have general anesthesia today for PAF ablation  He agrees to proceed    Risks include but are not limited to bleeding in the groin, infection in the groin and/or heart valves, fistula between the groin arteries and veins, valvular damage, diaphragmatic paralysis, aortic dissection, heart attack, stroke, blood clot in the leg, pulmonary embolism, lung collapse (pneumothorax or hemothorax), heart collapse (pericardial tamponade), death. The added risks for left sided ablation may be atrial-esophageal fistula, pulmonary vein stenoses, kidney failure (from contrast injection), higher risk of bleeding, stroke and heart attack. Elective or emergency surgery may be required to repair some of these complications. Prolonged hospitalization would be required. General anesthesia and transeptal catheterization may be required for the procedure     Thank you for involving me in this patient's care and please call with further concerns or questions. Enedina Snyder M.D.   Electrophysiology/Cardiology  [facility] [address] [facility] Fran Luis [address] [place] [address]  [phone]

## 2017-06-23 NOTE — PROCEDURES
ELECTROPHYSIOLOGY (EP) REPORT    DATE OF PROCEDURE: 6/23/2017     PROCEDURE:   1. Comprehensive Electrophysiology including CS/LA recording and pacing  2. Isuprel infusion for induction with pacing  3. Catheters were placed for  study under fluoroscopy and 3 D DANO velocity mapping. HISTORY:  This is a 43 y. o.man who has recurrent palpitation and he had treadmill test, holter recordings with PVC. Cardizem did not control his symptoms. He had atrial flutter ablation in 2011 and he had dual AV node physiology with echo beat but no AVNRT and also PAF. He wants to have EP study with possible ablation of PVC. The risks and benefits of the procedure were discussed with the patient and the patient wanted to proceed. PROCEDURE IN DETAILS:    After the informed written consent had been obtained, the patient was brought to the Electrophysiology Suite and was prepped and draped in the usual sterile fashion. Conscious sedation was initiated and maintained with intervenous Versed and fentanyl. Xylocaine 2% was given in the right inguinal area. Using the modified Seldinger technique, two 6 and one 5-Azeri sheaths were inserted over the guidewire inside the right femoral vein. Under the guidance of fluoroscopy, a 5 [place] CRD-2 quadripolar St Navin catheter was positioned at the His bundle for pacing and later recording and pacing in the HRA. The 5 F quadripolar catheter   is moved into the right ventricular apex for pacing and recording. The decapolar Bard catheter was placed for pacing and recording in the coronary sinus  The baseline conduction measurements were then obtained. Incremental ventricular pacing and programmed ventricular stimulation were performed. The incremental atrial pacing and programmed atrial stimulation were performed.  Burst pacing in right atrium, coronary sinus and RV apex were performed  Isuprel 2 mcg/min was used for induction and repeat pacing  When the procedure was deemed complete and all the catheters were removed. The digital compression with applied to both inguinal areas and achieved good hemostasis. COMPLICATIONS:  None. The patient was arousable after the procedure. ESTIMATED BLOOD LOSS:  10 mL. Specimen removed: NONE  Measurements:  Baseline EKG: normal sinus rhythm with  ms, QRS width 97 ms    BASELINE CONDUCTION MEASUREMENT: AH 71 ms and HV 54 ms. AV herberth Wenckebach is 330 ms. AV herberth ERP: 220 ms @ 500 ms  VA retrograde Wenckebach is 500 ms.        Tachycardia: none  PVC none  Dual AV node with av node echo beat present but no AVNRT    CONCLUSION:  I cannot recommend ablation of AVNRT at this point  There is no PVC to map and ablate  No inducible atrial fibrillation  He agrees to change cardizem to toprol for now

## 2017-06-24 VITALS
TEMPERATURE: 98.4 F | BODY MASS INDEX: 30.7 KG/M2 | RESPIRATION RATE: 16 BRPM | OXYGEN SATURATION: 95 % | SYSTOLIC BLOOD PRESSURE: 126 MMHG | WEIGHT: 226.63 LBS | HEART RATE: 68 BPM | DIASTOLIC BLOOD PRESSURE: 71 MMHG | HEIGHT: 72 IN

## 2017-06-24 PROBLEM — I49.3 SYMPTOMATIC PVCS: Status: ACTIVE | Noted: 2017-06-24

## 2017-06-24 LAB
EST. AVERAGE GLUCOSE BLD GHB EST-MCNC: 131 MG/DL
HBA1C MFR BLD: 6.2 % (ref 4.2–6.3)
TSH SERPL DL<=0.05 MIU/L-ACNC: 1.38 UIU/ML (ref 0.36–3.74)

## 2017-06-24 PROCEDURE — 74011250637 HC RX REV CODE- 250/637: Performed by: INTERNAL MEDICINE

## 2017-06-24 PROCEDURE — 99218 HC RM OBSERVATION: CPT

## 2017-06-24 PROCEDURE — 83036 HEMOGLOBIN GLYCOSYLATED A1C: CPT | Performed by: INTERNAL MEDICINE

## 2017-06-24 PROCEDURE — 74011250637 HC RX REV CODE- 250/637: Performed by: NURSE PRACTITIONER

## 2017-06-24 PROCEDURE — 36415 COLL VENOUS BLD VENIPUNCTURE: CPT | Performed by: INTERNAL MEDICINE

## 2017-06-24 PROCEDURE — 84443 ASSAY THYROID STIM HORMONE: CPT | Performed by: INTERNAL MEDICINE

## 2017-06-24 RX ORDER — METOPROLOL SUCCINATE 25 MG/1
25 TABLET, EXTENDED RELEASE ORAL DAILY
Qty: 90 TAB | Refills: 3 | Status: SHIPPED | OUTPATIENT
Start: 2017-06-24 | End: 2017-07-13 | Stop reason: ALTCHOICE

## 2017-06-24 RX ADMIN — FAMOTIDINE 20 MG: 20 TABLET ORAL at 08:25

## 2017-06-24 RX ADMIN — METOPROLOL SUCCINATE 50 MG: 50 TABLET, EXTENDED RELEASE ORAL at 08:25

## 2017-06-24 RX ADMIN — Medication 10 ML: at 05:03

## 2017-06-24 NOTE — PROGRESS NOTES
0730:Bedside shift change report given to Alex Allen (oncoming nurse) by Campos Lane (offgoing nurse). Report included the following information SBAR, Kardex, Procedure Summary, Intake/Output, MAR, Recent Results and Cardiac Rhythm NSR.     0830: I have reviewed discharge instructions with the patient. The patient verbalized understanding. Tele and IV removed, groin site is clean, dry and intact

## 2017-06-24 NOTE — PROGRESS NOTES
1930: Bedside shift change report given to Jack Hutchison (oncoming nurse) by Camden Montgomery RN (offgoing nurse). Report included the following information SBAR.     0700: Uneventful shift, no ectopy noted on the monitor, VSS.     0730: Bedside shift change report given to Darren Espinosa 82 (oncoming nurse) by Jack Hutchison (offgoing nurse). Report included the following information SBAR.

## 2017-06-24 NOTE — DISCHARGE SUMMARY
Cardiology Discharge Summary               Patient ID:  Irina Birch  [id number]  66 y.o.  1980    Admit Date: 6/23/2017    Discharge Date: 6/24/2017     Admitting Physician: Francie Campbell MD     Discharge Physician: Francie Campbell MD    Admission Diagnoses: eps\pvc\ablat;eps\pvc\ablat;PVC, palpitation    Discharge Diagnoses: Principal Problem:    Palpitations (6/10/2016)      Overview: 4/16 event monitor, nsr, sinus tach, pacs, pvcs    Active Problems:    Symptomatic PVCs (6/24/2017)        Discharge Condition: Stable    Cardiology Procedures this Admission:  EP study    Hospital Course: he did not have PVC during procedure to map or ablate  No inducible atrial or ventricular arrhythmia  He thinks he may have PAF but since atrial flutter ablation 6 years ago, there is no known PAF  He has av node echo beat but no AVNRT so this is not ablated since there is no documented SVT since atrial flutter ablation 6 years ago  He did not think PVC can be controlled with cardizem, will try toprol  He thinks 50 mg too strong and drops BP so will use 25 mg every day  Overnight telemetry did not show PVC or PAF  He was concerned about bleeding late at night if he had gone home   He lives far from [place] and there is no local hospital where he lives  No bleeding from right groin  He is  and he told me he will take a week off    Consults: None    Discharge Exam:     Visit Vitals    /60 (BP 1 Location: Right arm, BP Patient Position: At rest)    Pulse 71    Temp 97.9 °F (36.6 °C)    Resp 18    Ht 6' (1.829 m)    Wt 226 lb 10.1 oz (102.8 kg)    SpO2 93%    BMI 30.74 kg/m2     General Appearance:  Well developed, well nourished,alert and oriented x 3, and individual in no acute distress. Ears/Nose/Mouth/Throat:   Hearing grossly normal.         Neck: Supple. Chest:   Lungs clear to auscultation bilaterally. Cardiovascular:  Regular rate and rhythm, no murmur.    Abdomen:   Soft, non-tender Extremities: No edema bilaterally. Right groin no hematoma or bleeding   Skin: Warm and dry. Disposition: home    Patient Instructions:   Current Discharge Medication List      START taking these medications    Details   metoprolol succinate (TOPROL-XL) 25 mg XL tablet Take 1 Tab by mouth daily. Indications: PVC  Qty: 90 Tab, Refills: 3         CONTINUE these medications which have NOT CHANGED    Details   raNITIdine (ZANTAC) 75 mg tablet Take 150 mg by mouth two (2) times a day. clonazePAM (KLONOPIN) 0.5 mg tablet Take 0.5 mg by mouth nightly as needed. Taking one half tablet at night. As of 4-14-17, taking every am.         STOP taking these medications       diltiazem (CARDIZEM) 30 mg tablet Comments:   Reason for Stopping: did not control PVC                  Referenced discharge instructions provided by nursing for diet and activity.     Follow-up with     Future Appointments  Date Time Provider Farhat Sumner   7/13/2017 2:20 PM Montey Schirmer,  [address]        Signed:Colten Dowell MD  6/24/2017  7:19 AM

## 2017-06-30 ENCOUNTER — HOSPITAL ENCOUNTER (EMERGENCY)
Age: 37
Discharge: HOME | End: 2017-06-30
Attending: EMERGENCY MEDICINE
Payer: COMMERCIAL

## 2017-06-30 VITALS
HEIGHT: 72 IN | TEMPERATURE: 99.1 F | BODY MASS INDEX: 30.48 KG/M2 | WEIGHT: 225 LBS | HEART RATE: 74 BPM | RESPIRATION RATE: 14 BRPM | SYSTOLIC BLOOD PRESSURE: 117 MMHG | OXYGEN SATURATION: 95 % | DIASTOLIC BLOOD PRESSURE: 68 MMHG

## 2017-06-30 DIAGNOSIS — I49.3 PVC'S (PREMATURE VENTRICULAR CONTRACTIONS): Primary | ICD-10-CM

## 2017-06-30 LAB
ALBUMIN SERPL BCP-MCNC: 4.2 G/DL (ref 3.5–5)
ALBUMIN/GLOB SERPL: 1.2 {RATIO} (ref 1.1–2.2)
ALP SERPL-CCNC: 86 U/L (ref 45–117)
ALT SERPL-CCNC: 29 U/L (ref 12–78)
ANION GAP BLD CALC-SCNC: 8 MMOL/L (ref 5–15)
AST SERPL W P-5'-P-CCNC: 8 U/L (ref 15–37)
BASOPHILS # BLD AUTO: 0.1 K/UL (ref 0–0.1)
BASOPHILS # BLD: 1 % (ref 0–1)
BILIRUB SERPL-MCNC: 0.7 MG/DL (ref 0.2–1)
BUN SERPL-MCNC: 15 MG/DL (ref 6–20)
BUN/CREAT SERPL: 13 (ref 12–20)
CALCIUM SERPL-MCNC: 9.2 MG/DL (ref 8.5–10.1)
CHLORIDE SERPL-SCNC: 103 MMOL/L (ref 97–108)
CK SERPL-CCNC: 67 U/L (ref 39–308)
CO2 SERPL-SCNC: 26 MMOL/L (ref 21–32)
CREAT SERPL-MCNC: 1.14 MG/DL (ref 0.7–1.3)
EOSINOPHIL # BLD: 0.2 K/UL (ref 0–0.4)
EOSINOPHIL NFR BLD: 2 % (ref 0–7)
ERYTHROCYTE [DISTWIDTH] IN BLOOD BY AUTOMATED COUNT: 12.2 % (ref 11.5–14.5)
GLOBULIN SER CALC-MCNC: 3.4 G/DL (ref 2–4)
GLUCOSE SERPL-MCNC: 151 MG/DL (ref 65–100)
HCT VFR BLD AUTO: 45.1 % (ref 36.6–50.3)
HGB BLD-MCNC: 16.2 G/DL (ref 12.1–17)
LYMPHOCYTES # BLD AUTO: 29 % (ref 12–49)
LYMPHOCYTES # BLD: 2.7 K/UL (ref 0.8–3.5)
MAGNESIUM SERPL-MCNC: 2.1 MG/DL (ref 1.6–2.4)
MCH RBC QN AUTO: 31.9 PG (ref 26–34)
MCHC RBC AUTO-ENTMCNC: 35.9 G/DL (ref 30–36.5)
MCV RBC AUTO: 88.8 FL (ref 80–99)
MONOCYTES # BLD: 0.8 K/UL (ref 0–1)
MONOCYTES NFR BLD AUTO: 8 % (ref 5–13)
NEUTS SEG # BLD: 5.7 K/UL (ref 1.8–8)
NEUTS SEG NFR BLD AUTO: 60 % (ref 32–75)
PLATELET # BLD AUTO: 241 K/UL (ref 150–400)
POTASSIUM SERPL-SCNC: 3.9 MMOL/L (ref 3.5–5.1)
PROT SERPL-MCNC: 7.6 G/DL (ref 6.4–8.2)
RBC # BLD AUTO: 5.08 M/UL (ref 4.1–5.7)
SODIUM SERPL-SCNC: 137 MMOL/L (ref 136–145)
TROPONIN I SERPL-MCNC: <0.04 NG/ML
WBC # BLD AUTO: 9.3 K/UL (ref 4.1–11.1)

## 2017-06-30 PROCEDURE — 80053 COMPREHEN METABOLIC PANEL: CPT | Performed by: EMERGENCY MEDICINE

## 2017-06-30 PROCEDURE — 93005 ELECTROCARDIOGRAM TRACING: CPT

## 2017-06-30 PROCEDURE — 82550 ASSAY OF CK (CPK): CPT | Performed by: EMERGENCY MEDICINE

## 2017-06-30 PROCEDURE — 99285 EMERGENCY DEPT VISIT HI MDM: CPT

## 2017-06-30 PROCEDURE — 83735 ASSAY OF MAGNESIUM: CPT | Performed by: NURSE PRACTITIONER

## 2017-06-30 PROCEDURE — 36415 COLL VENOUS BLD VENIPUNCTURE: CPT | Performed by: EMERGENCY MEDICINE

## 2017-06-30 PROCEDURE — 84484 ASSAY OF TROPONIN QUANT: CPT | Performed by: EMERGENCY MEDICINE

## 2017-06-30 PROCEDURE — 85025 COMPLETE CBC W/AUTO DIFF WBC: CPT | Performed by: EMERGENCY MEDICINE

## 2017-06-30 PROCEDURE — 94762 N-INVAS EAR/PLS OXIMTRY CONT: CPT

## 2017-06-30 NOTE — ED TRIAGE NOTES
Pt reports having chest pain with SOB. Pt states he was transferred Thursday morning from a hospital in [place] [facility] [place] to having chest pain with multiple PVC's. Pt was discharge from ER @ 1500 Thursday afternoon. Pt states he has continued to have the PVC\"s. Pt seen Dr Carlos Alberto Benjamin and had ablation for atrial flutter in 2012. Pt was started on Metoprolol XL 25 mg PO every day on  6/23/2017.

## 2017-06-30 NOTE — ED PROVIDER NOTES
HPI Comments: 43 y.o. male with past medical history significant for A-Fib with recent medication change on 6/23/17 from Cardizem to Metoprolol XL who presents from home via private vehicle with chief complaint of palpitations, CP, SOB. Pt reports that he was seen 6 days ago in the ED for palpitations and was found to have frequent PVCs that resolved spontaneously and he was D/C home. Then 2 days ago his Sx returned with palpitations and associated substernal CP and SOB. Pt reports that he went to the local ED in [place], [place] where he was found to have frequent PVC's and was transferred to [address] for further evaluation. He reports he spent several hours in the ED at [facility] during which time his PVCs subsided and he was then D/C home. This afternoon the patient began to have similar palpitations with associated SOB and came here to [facility] for further evaluation. Pt states that he does not feel like the medication change to Metoprolol has helped his Sx. Pt denies fever, chills, nausea, vomiting, abdominal pain, diaphoresis. There are no other acute medical concerns at this time. PCP: Nuha Lemus MD  Cardiologist: Dr. Rogelio Maldonado  Note written by harris Gabriel, as dictated by Ifeoma Catalan MD 3:10 PM        The history is provided by the patient.         Past Medical History:   Diagnosis Date    Atrial fibrillation ([address]     Hernia of unspecified site of abdominal cavity without mention of obstruction or gangrene     repaired    Lipoma     Status post Nissen fundoplication (without gastrostomy tube) procedure        Past Surgical History:   Procedure Laterality Date    ABLATION OF SVT  4/25/2012         [facility]    EP STUDY W/INDUCTION  4/25/2012         HX HERNIA REPAIR      ISOPROTERENOL NON-COMP CON  4/25/2012         [place] INTRACARDIAC ELECTROPHYSIOLOGIC 3D MAPPING  4/25/2012              Family History:   Problem Relation Age of Onset    Heart Disease Father        Social History     Social History    Marital status:      Spouse name: N/A    Number of children: N/A    Years of education: N/A     Occupational History    Not on file. Social History Main Topics    Smoking status: Former Smoker     Packs/day: 0.50     Years: 0.00    Smokeless tobacco: Never Used    Alcohol use No    Drug use: No    Sexual activity: Not on file     Other Topics Concern    Not on file     Social History Narrative         ALLERGIES: Review of patient's allergies indicates no known allergies. Review of Systems   Constitutional: Negative for activity change, appetite change, diaphoresis and fatigue. HENT: Negative for ear pain, facial swelling, sore throat and trouble swallowing. Eyes: Negative for pain, discharge and visual disturbance. Respiratory: Positive for shortness of breath. Negative for chest tightness and wheezing. Cardiovascular: Positive for chest pain and palpitations. Gastrointestinal: Negative for abdominal pain, blood in stool, nausea and vomiting. Genitourinary: Negative for difficulty urinating, flank pain and hematuria. Musculoskeletal: Negative for arthralgias, joint swelling, myalgias and neck pain. Skin: Negative for color change and rash. Neurological: Negative for dizziness, weakness, numbness and headaches. Hematological: Negative for adenopathy. Does not bruise/bleed easily. Psychiatric/Behavioral: Negative for behavioral problems, confusion and sleep disturbance. All other systems reviewed and are negative. Vitals:    06/30/17 1445   BP: 139/79   Pulse: 87   Resp: 16   Temp: 98 °F (36.7 °C)   SpO2: 97%   Weight: 102.1 kg (225 lb)   Height: 6' (1.829 m)            Physical Exam   Constitutional: He is oriented to person, place, and time. He appears well-developed and well-nourished. No distress. HENT:   Head: Normocephalic and atraumatic.    Nose: Nose normal.   Mouth/Throat: Oropharynx is clear and moist.   Eyes: Conjunctivae and EOM are normal. Pupils are equal, round, and reactive to light. No scleral icterus. Neck: Normal range of motion. Neck supple. No JVD present. No tracheal deviation present. No thyromegaly present. No carotid bruits noted. Cardiovascular: Normal rate, regular rhythm, normal heart sounds and intact distal pulses. Exam reveals no gallop and no friction rub. No murmur heard. Pulmonary/Chest: Effort normal and breath sounds normal. No respiratory distress. He has no wheezes. He has no rales. He exhibits no tenderness. Abdominal: Soft. Bowel sounds are normal. He exhibits no distension and no mass. There is no tenderness. There is no rebound and no guarding. Musculoskeletal: Normal range of motion. He exhibits no edema or tenderness. Lymphadenopathy:     He has no cervical adenopathy. Neurological: He is alert and oriented to person, place, and time. He has normal reflexes. No cranial nerve deficit. Coordination normal.   Skin: Skin is warm and dry. No rash noted. No erythema. Psychiatric: He has a normal mood and affect. His behavior is normal. Judgment and thought content normal.   Nursing note and vitals reviewed. Note written by harris Montana, as dictated by Jadon Silva MD 3:21 PM      MDM  Number of Diagnoses or Management Options  PVC's (premature ventricular contractions): new and requires workup     Amount and/or Complexity of Data Reviewed  Clinical lab tests: ordered and reviewed  Decide to obtain previous medical records or to obtain history from someone other than the patient: yes    Risk of Complications, Morbidity, and/or Mortality  Presenting problems: high  Diagnostic procedures: high  Management options: high    Patient Progress  Patient progress: stable    ED Course       Procedures   Patient is in NAD in the ED.     EDMD EKG interpretation: NSR with rate 87 BPM. Normal axis and intervals noted. Occ PVC noted. No acute changes noted. 3:40 PM  Cardiology is in the ED at the patient's bedside. Patient continues to have some ectopy. Patient was reluctant to change any medications per suggestion of cardiology. He may increase his metoprolol doseage. Will follow up with cardiology in the office with no further changes in treatment. Stable in the ED.

## 2017-06-30 NOTE — CONSULTS
Cardiology Consult Note      Patient Name: Familia Anthony  : 1980 MRN: [id number]  Date: 2017  Time: 4:06 PM  Primary Cardiologist: Dr. Grace Haynes and Dr. Jose C Davis Cardiologist: Vlad Sweeney M.D.    Reason for Consult: PVCs    Requesting MD:  Dr. Montrell Farnsworth MD    HPI:  Familia Anthony is a 43 y.o. male  with PMH significant for A-Fib s/p ablation , GERD. He was discharged from [facility] on  after undergoing an EP study for frequent PVCs, recurrent palpitations. However the study did not produce any PVCs to ablate and the patient's medication was changed from cardizem to metoprolol XL 25 mg. He had been unable to increase the dose any further due to hypotension with increased dose of metoprolol XL. Pt reports that he was seen 6 days ago in the ED for palpitations and was found to have frequent PVCs that resolved spontaneously and he was D/C home. Then 2 days ago his Sx returned with palpitations and associated substernal CP and SOB. Pt reports that he went to the local ED in [place], [place] where he was found to have frequent PVC's and was transferred to [address] for further evaluation. He reports he spent several hours in the ED at [facility] during which time his PVCs subsided and he was then D/C home. He presents from home via private vehicle with chief complaint of palpitations, CP, SOB. This afternoon the patient began to have similar palpitations with associated SOB and came here to [facility] for further evaluation. Pt states that he does not feel like the medication change to Metoprolol has helped his Sx. Pt denied fever, chills, nausea, vomiting, abdominal pain, diaphoresis. Subjective:  Pt states that the palpitations  developed this afternoon when he was sitting at his desk. States the palpitations were stronger than previously and he had associated chest pain and SOB.  States that palpitations were worse with activity. States it feels like he develops a pattern of 2 rapid beats followed by a normal beat. Denies any caffeine intake. No problems sleeping  Denies any GERD symptoms- states heartburn resolved after starting zantac. Has jomar taking toprol XL 25 mg daily, klonopin 0.5 mg q pm and zantac. Assessment and Plan     1. PVCs:  Telemetry:  Sinus rhythm with occasional PVC. TTE 4/2017:  EF 55-60%, mild MR. No ischemia or infarct on lexiscan stress test 4/2017. K= 3.9   -Check magnesium- 2.1   -Toprol XL 25 mg daily   -Case discussed with Dr. Flavio Hart- offered pt trial of Flecainide but pt declined. Pt is agreeable to try increased dosing of Toprol XL- recommend Toprol XL 25 mg BID dosing.     -Pt has follow up with Dr. Flavio Hart  On 7/13/2017 at 2:20 pm.  Future Appointments  Date Time Provider Department Center   7/13/2017 2:20 PM Yon Velasquez  [address]         2. Chest pain:  Troponin <0.04. Chest pain associated with palpitations-no chest pain now. TTE 4/2017:  EF 55-60%, mild MR.  NL lexiscan stress test 4/2017- no ischemia or infarct   -no further cardiac testing. Cardiology attending: seen and examined. Agree with assess and plan  Tried to reassure patient that pvcs unlikely to lead to any serious consequences. No further cardiac testing at this time. Can be discharged. Cardiac testing  4/2017: NL lexiscan stress test- no ischemia or infarct  TTE 4/2017:  EF 55-60%, Mild MR  4/25/2012 EP and ablation  1. There was no retrograde VA conduction, no evidence of accessory pathway  2. Normal AV herberth antegrade conduction  3. Dual AV herberth physiology with AV herberth echo single beat was present but there was no AVNRT so this was not necessary to ablate  4. Atrial flutter was present and triggered atrial fibrillation. Mapping showed counterclockwise cavotricuspid isthmus atrial flutter. The isthmus was ablated and bidirectional line of block was obtained.  There was no further inducible arrhythmia (atrial tachycardia, flutter or fibrillation) obtained after ablation despite aggressive pacing protocol (at multiple RA and LA via CS sites) with and without isuprel up to the highest allowable dose, with and without anesthetic agent/sedation    Review of Symptoms:  Pertinent items are noted in HPI. and subjective    Previous treatment/evaluation includes echocardiogram and treadmill test, EP study . Cardiac risk factors: male gender, smoking hx    Past Medical History:   Diagnosis Date    Atrial fibrillation ([address]     Hernia of unspecified site of abdominal cavity without mention of obstruction or gangrene     repaired    Lipoma     Status post Nissen fundoplication (without gastrostomy tube) procedure      Past Surgical History:   Procedure Laterality Date    ABLATION OF SVT  4/25/2012         EP STUDY W/INDUCTION  2005    at [facility]    EP STUDY W/INDUCTION  4/25/2012         HX HERNIA REPAIR      ISOPROTERENOL NON-COMP CON  4/25/2012         MN INTRACARDIAC ELECTROPHYSIOLOGIC 3D MAPPING  4/25/2012          No current facility-administered medications for this encounter. Current Outpatient Prescriptions   Medication Sig    metoprolol succinate (TOPROL-XL) 25 mg XL tablet Take 1 Tab by mouth daily. Indications: PVC    raNITIdine (ZANTAC) 75 mg tablet Take 150 mg by mouth two (2) times a day.  clonazePAM (KLONOPIN) 0.5 mg tablet Take 0.5 mg by mouth nightly as needed. Taking one half tablet at night.   As of 4-14-17, taking every am.       No Known Allergies   Family History   Problem Relation Age of Onset    Heart Disease Father       Social History     Social History    Marital status:      Spouse name: N/A    Number of children: N/A    Years of education: N/A     Social History Main Topics    Smoking status: Former Smoker     Packs/day: 0.50     Years: 0.00    Smokeless tobacco: Never Used    Alcohol use No    Drug use: No    Sexual activity: Not on file     Other Topics Concern    Not on file     Social History Narrative       Objective:    Physical Exam    Vitals:   Vitals:    06/30/17 1445   BP: 139/79   Pulse: 87   Resp: 16   Temp: 98 °F (36.7 °C)   SpO2: 97%   Weight: 102.1 kg (225 lb)   Height: 6' (1.829 m)       General:    Alert, cooperative, no distress, appears stated age. Neck:   Supple, no JVD. Back:     Not examined    Lungs:     clear to auscultation bilaterally. Heart[de-identified]    Regular rate and rhythm, S1, S2 normal, no murmur, click, rub or gallop. Abdomen:     Soft, non-tender. Bowel sounds normal. No masses,  No      organomegaly. Extremities:   Extremities normal, atraumatic, no cyanosis or edema. Vascular:   Pulses - 2+   Skin:   Skin color normal. No rashes or lesions   Neurologic:   CN II-XII grossly intact. YAN       Telemetry: NSR, occasional PVC    ECG: nsr    Data Review:     Radiology:     No results for input(s): CPK, TROIQ in the last 72 hours. No lab exists for component: CKQMB, CPKMB, BMPP  No results for input(s): NA, K, CL, CO2, BUN, CREA, GLU, PHOS, CA in the last 72 hours. Recent Labs      06/30/17   1515   WBC  9.3   HGB  16.2   HCT  45.1   PLT  241     No results for input(s): PTP, INR, GPT, SGOT, AP in the last 72 hours. No lab exists for component: PTTP, INREXT  No results for input(s): CHOL, LDLC in the last 72 hours. No lab exists for component: TGL, HDLC,  HBA1C  No results for input(s): CRP, TSH, TSHEXT in the last 72 hours. No lab exists for component: ESR        Alex Piedra NP         [facility] [address] [place] [address] Rd., [address] [facility] 13, [address] [facility] [address]     [facility]     [phone]    MD Fabio Rosario MD

## 2017-06-30 NOTE — ED NOTES
ED MD EKG interpretation: NSR with rate 87 BPM. Occ PVC noted. Axis and intervals are normal. No ischemic changes noted.  Edison Garza MD

## 2017-07-01 LAB
ATRIAL RATE: 87 BPM
CALCULATED P AXIS, ECG09: 38 DEGREES
CALCULATED R AXIS, ECG10: 15 DEGREES
CALCULATED T AXIS, ECG11: 31 DEGREES
DIAGNOSIS, 93000: NORMAL
P-R INTERVAL, ECG05: 142 MS
Q-T INTERVAL, ECG07: 354 MS
QRS DURATION, ECG06: 98 MS
QTC CALCULATION (BEZET), ECG08: 425 MS
VENTRICULAR RATE, ECG03: 87 BPM

## 2017-07-10 ENCOUNTER — TELEPHONE (OUTPATIENT)
Dept: CARDIOLOGY CLINIC | Age: 37
End: 2017-07-10

## 2017-07-10 NOTE — TELEPHONE ENCOUNTER
Verified patient with two types of identifiers. Spoke to with and she states that he was in the hospital at [facility] in [facility] and would like for us to get records. Will see if NN can obtain prior to his appt.

## 2017-07-10 NOTE — TELEPHONE ENCOUNTER
The number below is not a fax number. Just an office number. Contacted the patient to clarify what she needed. If records is all that is needed we can pull them up. Attempted to reach patient by telephone. A message was left for return call.

## 2017-07-10 NOTE — TELEPHONE ENCOUNTER
Pt's wife calling to have our office send a request for the pt's notes from [facility] in [facility] the fax number is [phone].  [employer]

## 2017-07-13 ENCOUNTER — OFFICE VISIT (OUTPATIENT)
Dept: CARDIOLOGY CLINIC | Age: 37
End: 2017-07-13

## 2017-07-13 VITALS
BODY MASS INDEX: 30.2 KG/M2 | HEART RATE: 76 BPM | WEIGHT: 223 LBS | HEIGHT: 72 IN | DIASTOLIC BLOOD PRESSURE: 82 MMHG | SYSTOLIC BLOOD PRESSURE: 142 MMHG | RESPIRATION RATE: 18 BRPM

## 2017-07-13 DIAGNOSIS — I49.3 SYMPTOMATIC PVCS: Primary | ICD-10-CM

## 2017-07-13 DIAGNOSIS — Z86.79 S/P ABLATION OF ATRIAL FLUTTER: ICD-10-CM

## 2017-07-13 DIAGNOSIS — I48.0 PAROXYSMAL ATRIAL FIBRILLATION (HCC): ICD-10-CM

## 2017-07-13 DIAGNOSIS — Z98.890 S/P ABLATION OF ATRIAL FLUTTER: ICD-10-CM

## 2017-07-13 NOTE — PROGRESS NOTES
CARDIAC ELECTROPHYSIOLOGY CLINIC    Subjective:      Ld Marina is a 43 y.o. male who is seen for evaluation of palpitation and PVC. He had loop monitor 4/2016 with PAC and PVC  He had been seen in August 2014  He had rare PVC in the past  When he went to outside ER and said he saw ventricular bigeminy     He is today for follow up s/p EP for PVCs 6/23/17. There were no PVC to map and ablate. No inducible atrial fibrillation. He stopped Cardizem and started Toprol . Since the procedure he has been to ED twice  ([address] then transferred to [place]) for palpitations with associated chest pain and SOB. He stopped taking the Toprol last week and since then he has actually had less palpitations. He would prefer to not take medications. His says his BP is okay at home. He had been using Inderal back in 2005 when he had tachycardia and was studied in EP lab at [facility] and was told there was nothing inducible  He then came to [facility] with AFIB and ECG confirmed that in 7/2011 and echo there showed normal heart with possible mild LAE  He had GERD and 2 surgeries so he did EP study with me  4/25/2012 EP and ablation  1. There was no retrograde VA conduction, no evidence of accessory pathway  2. Normal AV herberth antegrade conduction  3. Dual AV herberth physiology with AV herberth echo single beat was present but there was no AVNRT so this was not necessary to ablate  4. Atrial flutter was present and triggered atrial fibrillation. Mapping showed counterclockwise cavotricuspid isthmus atrial flutter. The isthmus was ablated and bidirectional line of block was obtained.  There was no further inducible arrhythmia (atrial tachycardia, flutter or fibrillation) obtained after ablation despite aggressive pacing protocol (at multiple RA and LA via CS sites) with and without isuprel up to the highest allowable dose, with and without anesthetic agent/sedation      Problem List  Date Reviewed: 7/13/2017 Codes Class Noted    Symptomatic PVCs ICD-10-CM: I49.3  ICD-9-CM: 427.69  6/24/2017        Palpitations ICD-10-CM: R00.2  ICD-9-CM: 785.1  6/10/2016    Overview Signed 6/10/2016  8:22 AM by Farhat Morales MD     4/16 event monitor, nsr, sinus tach, pacs, pvcs             Paroxysmal atrial fibrillation ([facility] ICD-10-CM: I48.0  ICD-9-CM: 427.31  7/19/2011    Overview Addendum 4/25/2012  1:44 PM by Dex Ledezma MD     7/11 converted spontaneously in [facility] er. Normal echo at that time. 4/25/2012: [address], comprehensive EP study with 3 D mapping under conscious sedation and then general anesthesia  atrial flutter trigerring atrial fibrillation without isuprel were inducible with programmed stimulation at 1 extrastimulus, mapping showed right atrial flutter and ablation at the cavotricuspid isthmus were done. No further atrial flutter/tachycardia, fibrillation were inducible after ablation despite isuprel to wide open amount and burst rapid pacing to 2:1 AV block (240 ms), incremental and programmed atrial stimulation up to 3 extrastimuli to 200 ms at mid, high, low RA, prox CS, mid CS. Distal CS pacing did not capture. Dual AV herberth physiology is noted with one AV herberth echo beats but there was no AVNRT so this was not ablated. Atrial fibrillation ablation was not done because this was not the primary arrhythmia             SVT (supraventricular tachycardia) ([facility] ICD-10-CM: I47.1  ICD-9-CM: 427.89  7/19/2011    Overview Signed 7/19/2011  4:44 PM by Farhat Morales MD     approx 2006, with neg ep study and stress test at [facility]. Subsequently treated with inderal.             Family history of coronary artery disease ICD-10-CM: Z82.49  ICD-9-CM: V17.3  7/19/2011              Current Outpatient Prescriptions   Medication Sig Dispense Refill    raNITIdine (ZANTAC) 75 mg tablet Take 150 mg by mouth two (2) times a day.  clonazePAM (KLONOPIN) 0.5 mg tablet Take 0.5 mg by mouth nightly as needed. Taking one half tablet at night. As of 4-14-17, taking every am.       No Known Allergies  Past Medical History:   Diagnosis Date    Atrial fibrillation (HCC)     Hernia of unspecified site of abdominal cavity without mention of obstruction or gangrene     repaired    Lipoma     Status post Nissen fundoplication (without gastrostomy tube) procedure      Past Surgical History:   Procedure Laterality Date    ABLATION OF SVT  4/25/2012         EP STUDY W[facility]INDUCTION  2005    at [facility]    EP STUDY W/INDUCTION  4/25/2012         HX HERNIA REPAIR      ISOPROTERENOL NON-COMP CON  4/25/2012         MD INTRACARDIAC ELECTROPHYSIOLOGIC 3D MAPPING  4/25/2012          No family history of AFIB or arrhythmia, just CAD    Social History   Substance Use Topics    Smoking status: Former Smoker     Packs/day: 0.50     Years: 0.00    Smokeless tobacco: Never Used    Alcohol use No      Review of Systems    Constitutional: Negative for fever, chills, weight loss  HEENT: Negative for nosebleeds,vision changes. Respiratory: Negative for cough, hemoptysis, sputum production, and wheezing. Cardiovascular: Negative for chest pain, orthopnea, claudication, leg swelling, syncope, and PND. + palpitation at night  Gastrointestinal: Negative for nausea, vomiting, diarrhea, blood in stool and melena. Genitourinary: Negative for dysuria, urgency, frequency and hematuria. Musculoskeletal: Negative for myalgias. Skin: Negative for rash and itching. Heme: Does not bleed or bruise easily.    Neurological: Negative for speech change and focal weakness      Objective:     Visit Vitals    /82 (BP 1 Location: Right arm, BP Patient Position: Sitting)    Pulse 76    Resp 18    Ht 6' (1.829 m)    Wt 223 lb (101.2 kg)    BMI 30.24 kg/m2      Physical Exam:   General:  alert, cooperative, no distress, appears stated age   Neck: no bruits, no JVD   Chest Wall: respiratory effort normal   Lung: clear to auscultation bilaterally Heart:  normal rate, regular rhythm, no murmurs, rubs, clicks or gallops   Abdomen:  soft, non-tender   Extremities: No edema     ECG normal sinus rhythm normal intervals    Assessment/Plan:       ICD-10-CM ICD-9-CM    1. Symptomatic PVCs I49.3 427.69    2. Paroxysmal atrial fibrillation (HCC) I48.0 427.31 AMB POC EKG ROUTINE W/ 12 LEADS, INTER & REP   3. S/P ablation of atrial flutter Z98.890 V45.89     Z86.79     Reviewed records from [facility]. Will try and obtain ECG from [facility] as he said there were enough PVCs on that ECG unlike the one PVC at [facility]  He wants to have pace mapping if ECG at [facility] can suggest location   D/c toprol as he thinks it made him feel worse. Medora Goodpasture, M.D.  [facility]  Electrophysiology/Cardiology  [facility] [place]  [address] [facility], Franana Luis [address] [place] [address]  [phone]

## 2017-07-13 NOTE — MR AVS SNAPSHOT
Visit Information Date & Time Provider Department Dept. Phone Encounter #  
 7/13/2017  2:20 PM Dex Ledezma MD [facility] [phone] [id number] Follow-up Instructions Return in about 6 months (around 1/13/2018). Upcoming Health Maintenance Date Due DTaP/Tdap/Td series (1 - Tdap) 12/29/1999 INFLUENZA AGE 9 TO ADULT 8/1/2017 Allergies as of 7/13/2017  Review Complete On: 7/13/2017 By: Dex Ledezma MD  
 No Known Allergies Current Immunizations  Reviewed on 4/26/2012 No immunizations on file. Not reviewed this visit You Were Diagnosed With   
  
 Codes Comments Symptomatic PVCs    -  Primary ICD-10-CM: I49.3 ICD-9-CM: 427.69 Paroxysmal atrial fibrillation (HCC)     ICD-10-CM: I48.0 ICD-9-CM: 427.31 S/P ablation of atrial flutter     ICD-10-CM: Z98.890, Z86.79 
ICD-9-CM: V45.89 Vitals BP Pulse Resp Height(growth percentile) Weight(growth percentile) BMI  
 142/82 (BP 1 Location: Right arm, BP Patient Position: Sitting) 76 18 6' (1.829 m) 223 lb (101.2 kg) 30.24 kg/m2 Smoking Status Former Smoker Vitals History BMI and BSA Data Body Mass Index Body Surface Area  
 30.24 kg/m 2 2.27 m 2 Preferred Pharmacy Pharmacy Name Phone [address] [place] [address] 81 [phone] Your Updated Medication List  
  
   
This list is accurate as of: 7/13/17  3:00 PM.  Always use your most recent med list.  
  
  
  
  
 KlonoPIN 0.5 mg tablet Generic drug:  clonazePAM  
Take 0.5 mg by mouth nightly as needed. Taking one half tablet at night. As of 4-14-17, taking every am.  
  
 raNITIdine 75 mg tablet Commonly known as:  ZANTAC Take 150 mg by mouth two (2) times a day. We Performed the Following AMB POC EKG ROUTINE W/ 12 LEADS, INTER & REP [60456 CPT(R)] Follow-up Instructions Return in about 6 months (around 1/13/2018). Patient Instructions You will need to follow up in clinic with Dr. Bridger Farah in 6 months. Introducing [facility] SERVICES! Dear Joann Patel: Thank you for requesting a [employer] account. Our records indicate that you already have an active [employer] account. You can access your account anytime at [email or web address] Did you know that you can access your hospital and ER discharge instructions at any time in [employer]? You can also review all of your test results from your hospital stay or ER visit. Additional Information If you have questions, please visit the Frequently Asked Questions section of the [employer] website at [email or web address] Remember, [employer] is NOT to be used for urgent needs. For medical emergencies, dial 911. Now available from your iPhone and Android! Please provide this summary of care documentation to your next provider. Your primary care clinician is listed as Nino Cortes. If you have any questions after today's visit, please call [phone].

## 2017-07-13 NOTE — PROGRESS NOTES
CARDIAC ELECTROPHYSIOLOGY CLINIC    Subjective:      Fely Rashid is a 43 y.o. male who is seen for evaluation of palpitation and PVC. He had loop monitor 4/2016 with PAC and PVC  He had been seen in August 2014  He had rare PVC in the past  When he went to outside ER and said he saw ventricular bigeminy   He is today for follow up s/p EP for PVCs 6/23/17. There were no PVC to map and ablate. No inducible atrial fibrillation. He stopped Cardizem and started Toprol . Since the procedure he has been to ED twice  ([address] [facility] then transferred to [place]) for palpitations with associated chest pain and SOB. He stopped taking the Toprol last week and since then he has actually had less palpitations. He would prefer to not take medications. His says his BP is okay at home. He had been using Inderal back in 2005 when he had tachycardia and was studied in EP lab at [facility] and was told there was nothing inducible  He then came to [facility] with AFIB and ECG confirmed that in 7/2011 and echo there showed normal heart with possible mild LAE  He had GERD and 2 surgeries so he did EP study with me  4/25/2012 EP and ablation  1. There was no retrograde VA conduction, no evidence of accessory pathway  2. Normal AV herberth antegrade conduction  3. Dual AV herberth physiology with AV herberth echo single beat was present but there was no AVNRT so this was not necessary to ablate  4. Atrial flutter was present and triggered atrial fibrillation. Mapping showed counterclockwise cavotricuspid isthmus atrial flutter. The isthmus was ablated and bidirectional line of block was obtained.  There was no further inducible arrhythmia (atrial tachycardia, flutter or fibrillation) obtained after ablation despite aggressive pacing protocol (at multiple RA and LA via CS sites) with and without isuprel up to the highest allowable dose, with and without anesthetic agent/sedation      Problem List  Date Reviewed: 6/24/2017 Codes Class Noted    Symptomatic PVCs ICD-10-CM: I49.3  ICD-9-CM: 427.69  6/24/2017        Palpitations ICD-10-CM: R00.2  ICD-9-CM: 785.1  6/10/2016    Overview Signed 6/10/2016  8:22 AM by Holli Duval MD     4/16 event monitor, nsr, sinus tach, pacs, pvcs             [facility]oxysmal atrial fibrillation [facility] ICD-10-CM: I48.0  ICD-9-CM: 427.31  7/19/2011    Overview Addendum 4/25/2012  1:44 PM by Shanice Titus MD     7/11 converted spontaneously in [facility] er. Normal echo at that time. 4/25/2012: [facility], comprehensive EP study with 3 D mapping under conscious sedation and then general anesthesia  atrial flutter trigerring atrial fibrillation without isuprel were inducible with programmed stimulation at 1 extrastimulus, mapping showed right atrial flutter and ablation at the cavotricuspid isthmus were done. No further atrial flutter/tachycardia, fibrillation were inducible after ablation despite isuprel to wide open amount and burst rapid pacing to 2:1 AV block (240 ms), incremental and programmed atrial stimulation up to 3 extrastimuli to 200 ms at mid, high, low RA, prox CS, mid CS. Distal CS pacing did not capture. Dual AV herberth physiology is noted with one AV herberth echo beats but there was no AVNRT so this was not ablated. Atrial fibrillation ablation was not done because this was not the primary arrhythmia             SVT (supraventricular tachycardia) ([facility] ICD-10-CM: I47.1  ICD-9-CM: 427.89  7/19/2011    Overview Signed 7/19/2011  4:44 PM by Holli Duval MD     approx 2006, with neg ep study and stress test at [facility]. Subsequently treated with inderal.             Family history of coronary artery disease ICD-10-CM: Z82.49  ICD-9-CM: V17.3  7/19/2011              Current Outpatient Prescriptions   Medication Sig Dispense Refill    raNITIdine (ZANTAC) 75 mg tablet Take 150 mg by mouth two (2) times a day.  clonazePAM (KLONOPIN) 0.5 mg tablet Take 0.5 mg by mouth nightly as needed. Taking one half tablet at night. As of 4-14-17, taking every am.       No Known Allergies  Past Medical History:   Diagnosis Date    Atrial fibrillation (HCC)     Hernia of unspecified site of abdominal cavity without mention of obstruction or gangrene     repaired    Lipoma     Status post Nissen fundoplication (without gastrostomy tube) procedure      Past Surgical History:   Procedure Laterality Date    ABLATION OF SVT  4/25/2012         EP STUDY W/INDUCTION  2005    at [facility]    EP STUDY W/INDUCTION  4/25/2012         HX HERNIA REPAIR      ISOPROTERENOL NON-COMP CON  4/25/2012         OK INTRACARDIAC ELECTROPHYSIOLOGIC 3D MAPPING  4/25/2012          No family history of AFIB or arrhythmia, just CAD    Social History   Substance Use Topics    Smoking status: Former Smoker     Packs/day: 0.50     Years: 0.00    Smokeless tobacco: Never Used    Alcohol use No      Review of Systems    Constitutional: Negative for fever, chills, weight loss  HEENT: Negative for nosebleeds,vision changes. Respiratory: Negative for cough, hemoptysis, sputum production, and wheezing. Cardiovascular: Negative for chest pain, orthopnea, claudication, leg swelling, syncope, and PND. Gastrointestinal: Negative for nausea, vomiting, diarrhea, blood in stool and melena. Genitourinary: Negative for dysuria, urgency, frequency and hematuria. Musculoskeletal: Negative for myalgias. Skin: Negative for rash and itching. Heme: Does not bleed or bruise easily.    Neurological: Negative for speech change and focal weakness      Objective:     Visit Vitals    /82 (BP 1 Location: Right arm, BP Patient Position: Sitting)    Pulse 76    Resp 18    Ht 6' (1.829 m)    Wt 223 lb (101.2 kg)    BMI 30.24 kg/m2      Physical Exam:   General:  alert, cooperative, no distress, appears stated age   Neck: no bruits, no JVD   Chest Wall: respiratory effort normal   Lung: clear to auscultation bilaterally   Heart:  normal rate, regular rhythm, no murmurs, rubs, clicks or gallops   Abdomen:  soft, non-tender   Extremities: No edema       Assessment/Plan:       ICD-10-CM ICD-9-CM    1. Symptomatic PVCs I49.3 427.69    2. Paroxysmal atrial fibrillation (HCC) I48.0 427.31 AMB POC EKG ROUTINE W/ 12 LEADS, INTER & REP   3. S/P ablation of atrial flutter Z98.890 V45.89     Z86.79     [facility]. Will try and obtain ECG from [facility]. D/c toprol. Medora Goodpasture, M.D.  [facility]  Electrophysiology/Cardiology  [facility] [place]  [address], [address] [place] [address]  [phone]

## 2017-07-17 ENCOUNTER — TELEPHONE (OUTPATIENT)
Dept: CARDIOLOGY CLINIC | Age: 37
End: 2017-07-17

## 2017-07-17 NOTE — TELEPHONE ENCOUNTER
Received EKG from [facility] in [facility]. Dr Kenny Weaver reviewed and not change at this time. Not enough PVC's to do ablation. Verified patient with two types of identifiers. Notified patient.

## 2017-09-20 ENCOUNTER — OP HISTORICAL/CONVERTED ENCOUNTER (OUTPATIENT)
Dept: OTHER | Age: 37
End: 2017-09-20

## 2017-09-22 ENCOUNTER — OP HISTORICAL/CONVERTED ENCOUNTER (OUTPATIENT)
Dept: OTHER | Age: 37
End: 2017-09-22

## 2017-09-25 ENCOUNTER — OP HISTORICAL/CONVERTED ENCOUNTER (OUTPATIENT)
Dept: OTHER | Age: 37
End: 2017-09-25

## 2018-02-27 ENCOUNTER — HOSPITAL ENCOUNTER (OUTPATIENT)
Dept: GENERAL RADIOLOGY | Age: 38
Discharge: HOME | End: 2018-02-27
Attending: SPECIALIST
Payer: COMMERCIAL

## 2018-02-27 DIAGNOSIS — K21.9 GERD (GASTROESOPHAGEAL REFLUX DISEASE): ICD-10-CM

## 2018-02-27 PROCEDURE — 74220 X-RAY XM ESOPHAGUS 1CNTRST: CPT

## 2018-04-19 RX ORDER — METFORMIN HYDROCHLORIDE 500 MG/1
1000 TABLET, EXTENDED RELEASE ORAL 2 TIMES DAILY
COMMUNITY

## 2018-04-20 ENCOUNTER — HOSPITAL ENCOUNTER (OUTPATIENT)
Age: 38
Setting detail: OUTPATIENT SURGERY
Discharge: HOME | End: 2018-04-20
Attending: SPECIALIST | Admitting: SPECIALIST
Payer: COMMERCIAL

## 2018-04-20 VITALS
TEMPERATURE: 98.3 F | HEART RATE: 81 BPM | RESPIRATION RATE: 16 BRPM | SYSTOLIC BLOOD PRESSURE: 106 MMHG | BODY MASS INDEX: 31.3 KG/M2 | DIASTOLIC BLOOD PRESSURE: 82 MMHG | HEIGHT: 72 IN | WEIGHT: 231.06 LBS | OXYGEN SATURATION: 94 %

## 2018-04-20 PROCEDURE — 99152 MOD SED SAME PHYS/QHP 5/>YRS: CPT

## 2018-04-20 PROCEDURE — 76040000019: Performed by: SPECIALIST

## 2018-04-20 PROCEDURE — 74011250636 HC RX REV CODE- 250/636: Performed by: SPECIALIST

## 2018-04-20 PROCEDURE — 74011250636 HC RX REV CODE- 250/636

## 2018-04-20 RX ORDER — DEXTROMETHORPHAN/PSEUDOEPHED 2.5-7.5/.8
1.2 DROPS ORAL
Status: DISCONTINUED | OUTPATIENT
Start: 2018-04-20 | End: 2018-04-20 | Stop reason: HOSPADM

## 2018-04-20 RX ORDER — NALOXONE HYDROCHLORIDE 0.4 MG/ML
0.4 INJECTION, SOLUTION INTRAMUSCULAR; INTRAVENOUS; SUBCUTANEOUS
Status: DISCONTINUED | OUTPATIENT
Start: 2018-04-20 | End: 2018-04-20 | Stop reason: HOSPADM

## 2018-04-20 RX ORDER — ATROPINE SULFATE 0.1 MG/ML
0.5 INJECTION INTRAVENOUS
Status: DISCONTINUED | OUTPATIENT
Start: 2018-04-20 | End: 2018-04-20 | Stop reason: HOSPADM

## 2018-04-20 RX ORDER — EPINEPHRINE 0.1 MG/ML
1 INJECTION INTRACARDIAC; INTRAVENOUS
Status: DISCONTINUED | OUTPATIENT
Start: 2018-04-20 | End: 2018-04-20 | Stop reason: HOSPADM

## 2018-04-20 RX ORDER — FLUMAZENIL 0.1 MG/ML
0.2 INJECTION INTRAVENOUS
Status: DISCONTINUED | OUTPATIENT
Start: 2018-04-20 | End: 2018-04-20 | Stop reason: HOSPADM

## 2018-04-20 RX ORDER — FENTANYL CITRATE 50 UG/ML
100 INJECTION, SOLUTION INTRAMUSCULAR; INTRAVENOUS
Status: DISCONTINUED | OUTPATIENT
Start: 2018-04-20 | End: 2018-04-20 | Stop reason: HOSPADM

## 2018-04-20 RX ORDER — MIDAZOLAM HYDROCHLORIDE 1 MG/ML
.25-5 INJECTION, SOLUTION INTRAMUSCULAR; INTRAVENOUS
Status: DISCONTINUED | OUTPATIENT
Start: 2018-04-20 | End: 2018-04-20 | Stop reason: HOSPADM

## 2018-04-20 RX ORDER — SUCRALFATE 1 G/1
1 TABLET ORAL 4 TIMES DAILY
Qty: 60 TAB | Refills: 1 | Status: SHIPPED | OUTPATIENT
Start: 2018-04-20 | End: 2018-04-27

## 2018-04-20 RX ORDER — MIDAZOLAM HYDROCHLORIDE 1 MG/ML
INJECTION, SOLUTION INTRAMUSCULAR; INTRAVENOUS
Status: DISCONTINUED
Start: 2018-04-20 | End: 2018-04-20 | Stop reason: HOSPADM

## 2018-04-20 RX ORDER — SODIUM CHLORIDE 0.9 % (FLUSH) 0.9 %
5-10 SYRINGE (ML) INJECTION AS NEEDED
Status: DISCONTINUED | OUTPATIENT
Start: 2018-04-20 | End: 2018-04-20 | Stop reason: HOSPADM

## 2018-04-20 RX ORDER — DIPHENHYDRAMINE HYDROCHLORIDE 50 MG/ML
INJECTION, SOLUTION INTRAMUSCULAR; INTRAVENOUS
Status: DISCONTINUED
Start: 2018-04-20 | End: 2018-04-20 | Stop reason: HOSPADM

## 2018-04-20 RX ORDER — SODIUM CHLORIDE 9 MG/ML
100 INJECTION, SOLUTION INTRAVENOUS CONTINUOUS
Status: DISCONTINUED | OUTPATIENT
Start: 2018-04-20 | End: 2018-04-20 | Stop reason: HOSPADM

## 2018-04-20 RX ORDER — SODIUM CHLORIDE 0.9 % (FLUSH) 0.9 %
5-10 SYRINGE (ML) INJECTION EVERY 8 HOURS
Status: DISCONTINUED | OUTPATIENT
Start: 2018-04-20 | End: 2018-04-20 | Stop reason: HOSPADM

## 2018-04-20 RX ADMIN — FENTANYL CITRATE 25 MCG: 50 INJECTION, SOLUTION INTRAMUSCULAR; INTRAVENOUS at 12:25

## 2018-04-20 RX ADMIN — FENTANYL CITRATE 50 MCG: 50 INJECTION, SOLUTION INTRAMUSCULAR; INTRAVENOUS at 12:19

## 2018-04-20 RX ADMIN — FENTANYL CITRATE 100 MCG: 50 INJECTION, SOLUTION INTRAMUSCULAR; INTRAVENOUS at 12:28

## 2018-04-20 RX ADMIN — MIDAZOLAM 2 MG: 1 INJECTION INTRAMUSCULAR; INTRAVENOUS at 12:28

## 2018-04-20 RX ADMIN — MIDAZOLAM 2 MG: 1 INJECTION INTRAMUSCULAR; INTRAVENOUS at 12:16

## 2018-04-20 RX ADMIN — FENTANYL CITRATE 50 MCG: 50 INJECTION, SOLUTION INTRAMUSCULAR; INTRAVENOUS at 12:16

## 2018-04-20 RX ADMIN — MIDAZOLAM 2 MG: 1 INJECTION INTRAMUSCULAR; INTRAVENOUS at 12:22

## 2018-04-20 RX ADMIN — MIDAZOLAM 2 MG: 1 INJECTION INTRAMUSCULAR; INTRAVENOUS at 12:25

## 2018-04-20 RX ADMIN — MIDAZOLAM 2 MG: 1 INJECTION INTRAMUSCULAR; INTRAVENOUS at 12:19

## 2018-04-20 RX ADMIN — SODIUM CHLORIDE 100 ML/HR: 900 INJECTION, SOLUTION INTRAVENOUS at 11:17

## 2018-04-20 NOTE — ROUTINE PROCESS
Jemima Caceres  1980  [id number]    Situation:  Verbal report received from: Vincent Nice RN  Procedure: Procedure(s):  BRAVO CLIP REMOVAL   ESOPHAGOGASTRODUODENOSCOPY (EGD)    Background:    Preoperative diagnosis: PAIN IN ESOPHAGUS  Postoperative diagnosis: bravo clip removal    :  Dr. Alan Gaviria  Assistant(s): Endoscopy Technician-1: David Mohr  Endoscopy RN-1: uSjatha Munoz RN    Specimens: * No specimens in log *  H. Pylori  no    Assessment:  Intra-procedure medications   10 mg Versed  150 mcg Fentanyl  900 cc of IVF. Anesthesia gave intra-procedure sedation and medications, see anesthesia flow sheet no     Intravenous fluids: NS@ KVO     Vital signs stable       Abdominal assessment: round and soft       Recommendation:  Discharge patient per MD order  .     Family or Friend   WifeJessica  Permission to share finding with family or friend yes

## 2018-04-20 NOTE — DISCHARGE INSTRUCTIONS
Maryam Portillo  [id number]  1980              Procedure  Discharge Instructions:      Discomfort:  Redness at IV site- apply warm compress to area; if redness or soreness persist- contact your physician  There may be a slight amount of blood passed from the rectum  Gaseous discomfort- walking, belching will help relieve any discomfort  You may not operate a vehicle for 12 hours  You may not engage in an occupation involving machinery or appliances for rest of today  You may not drink alcoholic beverages for at least 12 hours  Avoid making any critical decisions for at least 24 hour  DIET:   You may resume your normal diet today. You should not overeat or \"feast\" today as your abdomen may become distended or uncomfortable. MEDICATIONS:   I reconciled this list from the list you gave us when you came today for the procedure. Please clarify with me, your primary care physician and the nurse who is discharging you if we have any discrepancies. Aspirin and or non-steroidal medication (Ibuprofen, Motrin, naproxen, etc.) is ok in limited quantities. ACTIVITY:  You may resume your normal daily activities it is recommended that you spend the remainder of the day resting -  avoid any strenuous activity. CALL M.D. ANY SIGN OF:  Increasing pain, nausea, vomiting  Abdominal distension (swelling)  New increased bleeding (oral or rectal)  Fever (chills)  Pain in chest area  Bloody discharge from nose or mouth  Shortness of breath          Follow-up Instructions:   No biopsy. Ok for sucralfate for a week due to the bravo clip ulcer and the ulcer across from it. Telephone #  [phone]  Follow up visit as previously scheduled. Mildred Aiken MD  12:44 PM  4/20/2018   [employer] Activation    Thank you for requesting access to [employer]. Please follow the instructions below to securely access and download your online medical record.  [employer] allows you to send messages to your doctor, view your test results, renew your prescriptions, schedule appointments, and more. How Do I Sign Up? 1. In your internet browser, go to [email or web address] Access  2. Click on the First Time User? Click Here link in the Sign In box. You will be redirect to the New Member Sign Up page. 3. Enter your Parrut Access Code exactly as it appears below. You will not need to use this code after youve completed the sign-up process. If you do not sign up before the expiration date, you must request a new code. MyChart Access Code: Activation code not generated  Current Parrut Status: Active (This is the date your iMERt access code will )    4. Enter the last four digits of your Social Security Number (xxxx) and Date of Birth (mm/dd/yyyy) as indicated and click Submit. You will be taken to the next sign-up page. 5. Create a Parrut ID. This will be your Parrut login ID and cannot be changed, so think of one that is secure and easy to remember. 6. Create a Parrut password. You can change your password at any time. 7. Enter your Password Reset Question and Answer. This can be used at a later time if you forget your password. 8. Enter your e-mail address. You will receive e-mail notification when new information is available in [address] 9. Click Sign Up. You can now view and download portions of your medical record. 10. Click the Download Summary menu link to download a portable copy of your medical information. Additional Information    If you have questions, please visit the Frequently Asked Questions section of the Parrut website at [email or web address] Remember, Parrut is NOT to be used for urgent needs. For medical emergencies, dial 911.

## 2018-04-20 NOTE — IP AVS SNAPSHOT
[address] 
[phone] Patient: Fely Rashid MRN: [id number] :1980 About your hospitalization You were admitted on:  2018 You last received care in the:  [facility] You were discharged on:  2018 Why you were hospitalized Your primary diagnosis was:  Not on File Follow-up Information Follow up With Details Comments Contact Info Ankit Ramirez MD   [address] [facility] [address] [place] [address] 
[phone] Discharge Orders None A check chico indicates which time of day the medication should be taken. My Medications START taking these medications Instructions Each Dose to Equal  
 Morning Noon Evening Bedtime  
 sucralfate 1 gram tablet Commonly known as:  Jeanette Sers Your last dose was: Your next dose is: Take 1 Tab by mouth four (4) times daily for 7 days. 1 g CONTINUE taking these medications Instructions Each Dose to Equal  
 Morning Noon Evening Bedtime KlonoPIN 0.5 mg tablet Generic drug:  clonazePAM  
   
Your last dose was: Your next dose is: Take 0.5 mg by mouth daily. Taking one half tablet at night. As of 17, taking every am.  
 0.5 mg  
    
   
   
   
  
 metFORMIN  mg tablet Commonly known as:  GLUCOPHAGE XR Your last dose was: Your next dose is: Take 500 mg by mouth two (2) times a day. 500 mg Where to Get Your Medications Information on where to get these meds will be given to you by the nurse or doctor. ! Ask your nurse or doctor about these medications  
  sucralfate 1 gram tablet Discharge Instructions Fely Rashid [id number] 
1980 Procedure  Discharge Instructions:   
 
Discomfort: Redness at IV site- apply warm compress to area; if redness or soreness persist- contact your physician There may be a slight amount of blood passed from the rectum Gaseous discomfort- walking, belching will help relieve any discomfort You may not operate a vehicle for 12 hours You may not engage in an occupation involving machinery or appliances for rest of today You may not drink alcoholic beverages for at least 12 hours Avoid making any critical decisions for at least 24 hour DIET: 
 You may resume your normal diet today. You should not overeat or \"feast\" today as your abdomen may become distended or uncomfortable. MEDICATIONS: 
 I reconciled this list from the list you gave us when you came today for the procedure. Please clarify with me, your primary care physician and the nurse who is discharging you if we have any discrepancies. Aspirin and or non-steroidal medication (Ibuprofen, Motrin, naproxen, etc.) is ok in limited quantities. ACTIVITY: 
You may resume your normal daily activities it is recommended that you spend the remainder of the day resting -  avoid any strenuous activity. CALL M.D. ANY SIGN OF: Increasing pain, nausea, vomiting Abdominal distension (swelling) New increased bleeding (oral or rectal) Fever (chills) Pain in chest area Bloody discharge from nose or mouth Shortness of breath Follow-up Instructions: No biopsy. Ok for sucralfate for a week due to the bravo clip ulcer and the ulcer across from it. Telephone #  [phone] Follow up visit as previously scheduled. Sanna Bustillos MD 
12:44 PM 
4/20/2018 [employer] Ped[employer] Activation Thank you for requesting access to [employer] PedidosJ[employer]. Please follow the instructions below to securely access and download your online medical record. [employer]Ya [employer] PedidosJ[employer]¡ allows you to send messages to your doctor, view your test results, renew your prescriptions, schedule appointments, and more. How Do I Sign Up? 1. In your internet browser, go to [email or web address] 
2. Click on the First Time User? Click Here link in the Sign In box. You will be redirect to the New Member Sign Up page. 3. Enter your MyLifePlace Access Code exactly as it appears below. You will not need to use this code after youve completed the sign-up process. If you do not sign up before the expiration date, you must request a new code. Moverhart Access Code: Activation code not generated Current MyLifePlace Status: Active (This is the date your Tamar Energyt access code will ) 4. Enter the last four digits of your Social Security Number (xxxx) and Date of Birth (mm/dd/yyyy) as indicated and click Submit. You will be taken to the next sign-up page. 5. Create a Tamar Energyt ID. This will be your MyLifePlace login ID and cannot be changed, so think of one that is secure and easy to remember. 6. Create a MyLifePlace password. You can change your password at any time. 7. Enter your Password Reset Question and Answer. This can be used at a later time if you forget your password. 8. Enter your e-mail address. You will receive e-mail notification when new information is available in [address] 9. Click Sign Up. You can now view and download portions of your medical record. 10. Click the Download Summary menu link to download a portable copy of your medical information. Additional Information If you have questions, please visit the Frequently Asked Questions section of the MyLifePlace website at [email or web address] Remember, MyLifePlace is NOT to be used for urgent needs. For medical emergencies, dial 911. Introducing [facility]! Dear Bart Sultana: Thank you for requesting a MyLifePlace account. Our records indicate that you already have an active MyLifePlace account. You can access your account anytime at [email or web address] Did you know that you can access your hospital and ER discharge instructions at any time in Next Callerhart? You can also review all of your test results from your hospital stay or ER visit. Additional Information If you have questions, please visit the Frequently Asked Questions section of the CURRENTt website at [email or web address] Remember, CURRENTt is NOT to be used for urgent needs. For medical emergencies, dial 911. Now available from your iPhone and Android! Introducing Zeferino Jaimes As a [facility] patient, I wanted to make you aware of our electronic visit tool called Zeferino Jaimes. GonzalezL[place]ofoto 24/7 allows you to connect within minutes with a medical provider 24 hours a day, seven days a week via a mobile device or tablet or logging into a secure website from your computer. You can access Zeferino Jaimes from anywhere in the [place]. A virtual visit might be right for you when you have a simple condition and feel like you just dont want to get out of bed, or cant get away from work for an appointment, when your regular [facility] provider is not available (evenings, weekends or holidays), or when youre out of town and need minor care. Electronic visits cost only $49 and if the Gonzalez[place]ok[place]oto 24/Quincee provider determines a prescription is needed to treat your condition, one can be electronically transmitted to a nearby pharmacy*. Please take a moment to enroll today if you have not already done so. The enrollment process is free and takes just a few minutes. To enroll, please download the [employer] 24/Quincee xavier to your tablet or phone, or visit [email or web address] to enroll on your computer. And, as an [address] [place] [address] patient with a [employer] account, the results of your visits will be scanned into your electronic medical record and your primary care provider will be able to view the scanned results.    
We urge you to continue to see your regular [facility] provider for your ongoing medical care. And while your primary care provider may not be the one available when you seek a Zeferino Jaimes virtual visit, the peace of mind you get from getting a real diagnosis real time can be priceless. For more information on Zeferino Jaimes, view our Frequently Asked Questions (FAQs) at [email or web address]. Sincerely, 
 
Judith Peralta MD 
Chief Medical Officer [employer] *:  certain medications cannot be prescribed via Zeferino Jaimes Providers Seen During Your Hospitalization Provider Specialty Primary office phone Tony Graff MD Gastroenterology [phone] Your Primary Care Physician (PCP) Primary Care Physician Office Phone Office Fax Payal Maresker [phone] You are allergic to the following No active allergies Recent Documentation Height Weight BMI Smoking Status 1.829 m 104.8 kg 31.34 kg/m2 Former Smoker Emergency Contacts Name Discharge Info Relation Home Work Mobile Jameson Jude CAREGIVER [3] Spouse [3] [phone] Patient Belongings The following personal items are in your possession at time of discharge: 
  Dental Appliances: None  Visual Aid: None Please provide this summary of care documentation to your next provider. Signatures-by signing, you are acknowledging that this After Visit Summary has been reviewed with you and you have received a copy. Patient Signature:  ____________________________________________________________ Date:  ____________________________________________________________  
  
Fayrene Retort Provider Signature:  ____________________________________________________________ Date:  ____________________________________________________________

## 2018-04-20 NOTE — H&P
Pre-endoscopy H and P    The patient was seen and examined in the endoscopy suite. The airway was assessed and docuemented. The problem list, past medical history, and medications were reviewed. Patient Active Problem List   Diagnosis Code    Paroxysmal atrial fibrillation (HCC) I48.0    SVT (supraventricular tachycardia) (HCC) I47.1    Family history of coronary artery disease Z82.49    Palpitations R00.2    Symptomatic PVCs I49.3     Social History     Social History    Marital status:      Spouse name: N/A    Number of children: N/A    Years of education: N/A     Occupational History    Not on file. Social History Main Topics    Smoking status: Former Smoker     Packs/day: 1.00     Years: 20.00     Quit date: 04/2017    Smokeless tobacco: Former User    Alcohol use No    Drug use: No    Sexual activity: Not on file     Other Topics Concern    Not on file     Social History Narrative     Past Medical History:   Diagnosis Date    Atrial [facility] ([facility] 2011    ablation 2012    Diabetes ([facility]     GERD (gastroesophageal reflux disease)     Hernia of unspecified site of abdominal cavity without mention of obstruction or gangrene     repaired    Lipoma     PUD (peptic ulcer disease)     Status post Nissen fundoplication (without gastrostomy tube) procedure      The patient has a family history of na    Prior to Admission Medications   Prescriptions Last Dose Informant Patient Reported? Taking? clonazePAM (KLONOPIN) 0.5 mg tablet 4/20/2018 at Unknown time  Yes Yes   Sig: Take 0.5 mg by mouth daily. Taking one half tablet at night. As of 4-14-17, taking every am.   metFORMIN ER (GLUCOPHAGE XR) 500 mg tablet 4/19/2018 at Unknown time  Yes Yes   Sig: Take 500 mg by mouth two (2) times a day. Facility-Administered Medications: None           The review of systems is:  negative for shortness of breath. It is + for chest pain with each swallow since the bravo clip.         The heart, lungs, and mental status were satisfactory for the administration of conscious sedation and for the procedure. I discussed with the patient the objectives, risks, consequences and alternatives to the procedure.       Cyril Kruger MD  4/20/2018  12:13 PM

## 2018-04-20 NOTE — PROCEDURES
Esophagogastroduodenoscopy    Indications:  Bravo clip causing pain    Medications:  Sedation start:  1215  Sedation end:  1233  Fentanyl 150mcg  Midazolam 10 mg  All sedation given by RN under my direct supervision    Post procedure diagnosis:  bravo clip in esophagus, disconnected from the mucosa of the esophagus. Ulcer in the esophagus across from the clipe  Incomplete exam    Description of Procedure:    Prior to the procedure its objectives, risks, consequences and alternatives were discussed with the patient who then elected to proceed. The Olympus video endoscope was inserted under direct vision into the mouth and then into the esophagus. The proximal esophagus was normal.  In the mid/ distal esophagus I saw a bravo clip. Across from the clip was a 1 cm ulcer. I removed the clip by snaring it and pulling backwards. The clip did not stay in the snare and appeared to move distally. There was oozing at the clip site. The remainder of the esophagus looked normal.    The z-line was seen briefly abnd was normal.  There were no diagnostic abnormalities of the proximal body on direct examination. I did not see the clip in the stomach. The patient was uncomfortable despite the sedation and I removed the scope. his included direct and retroflexion examination. The first and second portion of the duodenum appeared normal.          Complications: There were no apparent complications and the patient tolerated the procedure well. Estimated Blood Loss:  About 10 ml  Specimens Removed:  none  Impressions:  Successful dislodgement of the bravo clip  Ulcer across from the clip in the esophagus.    Incomplete exam      Signed By: Shanta Bhatia MD                        April 20, 2018     12:36 PM

## 2018-05-29 ENCOUNTER — TELEPHONE (OUTPATIENT)
Dept: CARDIOLOGY CLINIC | Age: 38
End: 2018-05-29

## 2018-05-29 NOTE — TELEPHONE ENCOUNTER
Pt wife Paris Santiago called requesting a sooner annual appt than first available with Dr. Cathie Jones due to recent ER visit. Pt wife can be reached at #[phone].   Thanks

## 2018-05-29 NOTE — TELEPHONE ENCOUNTER
Verified patient with two types of identifiers. States that he was at [facility] ED in [facility] and they told him to follow up with Dr Dm Gutierrez. She states that he was advised to not return to work until he is seen by us. Offered appt for FRANK Miranda today at 2:00pm so that we could evaluate and have him return to work but patient does not want to see NP. Made appt with Dr Dm Gutierrez 6/21/18.

## 2018-05-30 ENCOUNTER — OFFICE VISIT (OUTPATIENT)
Dept: CARDIOLOGY CLINIC | Age: 38
End: 2018-05-30

## 2018-05-30 ENCOUNTER — TELEPHONE (OUTPATIENT)
Dept: CARDIOLOGY CLINIC | Age: 38
End: 2018-05-30

## 2018-05-30 VITALS
SYSTOLIC BLOOD PRESSURE: 120 MMHG | HEIGHT: 72 IN | OXYGEN SATURATION: 99 % | WEIGHT: 227 LBS | DIASTOLIC BLOOD PRESSURE: 90 MMHG | RESPIRATION RATE: 20 BRPM | BODY MASS INDEX: 30.75 KG/M2 | HEART RATE: 115 BPM

## 2018-05-30 DIAGNOSIS — I48.0 PAF (PAROXYSMAL ATRIAL FIBRILLATION) (HCC): Primary | ICD-10-CM

## 2018-05-30 DIAGNOSIS — I48.0 PAROXYSMAL ATRIAL FIBRILLATION (HCC): Primary | ICD-10-CM

## 2018-05-30 DIAGNOSIS — Z82.49 FAMILY HISTORY OF CORONARY ARTERY DISEASE: ICD-10-CM

## 2018-05-30 DIAGNOSIS — R00.2 PALPITATIONS: ICD-10-CM

## 2018-05-30 DIAGNOSIS — I49.3 SYMPTOMATIC PVCS: ICD-10-CM

## 2018-05-30 DIAGNOSIS — I47.1 SVT (SUPRAVENTRICULAR TACHYCARDIA) (HCC): ICD-10-CM

## 2018-05-30 RX ORDER — APIXABAN 5 MG/1
TABLET, FILM COATED ORAL
COMMUNITY
Start: 2018-05-29 | End: 2018-05-30 | Stop reason: SDUPTHER

## 2018-05-30 RX ORDER — APIXABAN 5 MG/1
5 TABLET, FILM COATED ORAL 2 TIMES DAILY
Qty: 60 TAB | Refills: 5 | Status: SHIPPED | OUTPATIENT
Start: 2018-05-30 | End: 2019-02-22 | Stop reason: SDUPTHER

## 2018-05-30 RX ORDER — ATORVASTATIN CALCIUM 20 MG/1
20 TABLET, FILM COATED ORAL DAILY
COMMUNITY
Start: 2018-04-16 | End: 2019-08-13

## 2018-05-30 NOTE — TELEPHONE ENCOUNTER
Dr. Theodora Middleton would like patient to have an event monitor mailed to him. Dx paroxysmal a-fib.

## 2018-05-30 NOTE — PROGRESS NOTES
HISTORY OF PRESENT ILLNESS  Sue Conroy is a 42 y.o. male     SUMMARY:   Problem List  Date Reviewed: 5/30/2018          Codes Class Noted    Symptomatic PVCs ICD-10-CM: I49.3  ICD-9-CM: 427.69  6/24/2017        Palpitations ICD-10-CM: R00.2  ICD-9-CM: 785.1  6/10/2016    Overview Signed 6/10/2016  8:22 AM by Molina Agosto MD     4/16 event monitor, nsr, sinus tach, pacs, pvcs             Paroxysmal atrial fibrillation ([facility] [address] ICD-10-CM: I48.0  ICD-9-CM: 427.31  7/19/2011    Overview Addendum 4/25/2012  1:44 PM by Yon Velasquez MD     7/11 converted spontaneously in [facility] er. Normal echo at that time. 4/25/2012: [facility], comprehensive EP study with 3 D mapping under conscious sedation and then general anesthesia  atrial flutter trigerring atrial fibrillation without isuprel were inducible with programmed stimulation at 1 extrastimulus, mapping showed right atrial flutter and ablation at the cavotricuspid isthmus were done. No further atrial flutter/tachycardia, fibrillation were inducible after ablation despite isuprel to wide open amount and burst rapid pacing to 2:1 AV block (240 ms), incremental and programmed atrial stimulation up to 3 extrastimuli to 200 ms at mid, high, low RA, prox CS, mid CS. Distal CS pacing did not capture. Dual AV herberth physiology is noted with one AV herberth echo beats but there was no AVNRT so this was not ablated. Atrial fibrillation ablation was not done because this was not the primary arrhythmia             SVT (supraventricular tachycardia) ([facility] [address] ICD-10-CM: I47.1  ICD-9-CM: 427.89  7/19/2011    Overview Signed 7/19/2011  4:44 PM by Molina Agosto MD     approx 2006, with neg ep study and stress test at [facility].  Subsequently treated with inderal.             Family history of coronary artery disease ICD-10-CM: Z82.49  ICD-9-CM: V17.3  7/19/2011              Current Outpatient Prescriptions on File Prior to Visit   Medication Sig    metFORMIN ER (GLUCOPHAGE XR) 500 mg tablet Take 500 mg by mouth two (2) times a day.  clonazePAM (KLONOPIN) 0.5 mg tablet Take 0.5 mg by mouth daily. Taking one half tablet at night. As of 4-14-17, taking every am.     No current facility-administered medications on file prior to visit. CARDIOLOGY STUDIES TO DATE:  2006 normal stress test at [facility]  7/11 normal echocardiogram at [facility]  5/16 event monitor pvc, pacs, sinus tachycardia  4/17 mild mr otherwise normal echo  4/17 normal nuclear stress test, [facility]    Chief Complaint   Patient presents with    Irregular Heart Beat     HPI :  Mr. Ramirez Marcelino ended up in the [address] [place] [address] ER on the 24th of this month with a neurologic spell characterized by visual disturbance, dizziness and transient memory loss. He was admitted and had negative telemetry, a normal echocardiogram, MRI, carotids and CT of the head. Differential diagnosis included TIA from unrecognized atrial fibrillation perhaps, complex migraine or pseudoseizure. Ever since his ablation we have tried to capture atrial fibrillation on a couple of occasions with event monitors and not seen any and he has not been aware of any himself. Because of the concern about silent atrial fibrillation, it was recommended that he resume Eliquis 5 mg twice a day.         the records from [facility] have been reviewed and summarized in the appropriate places in his chart  CARDIAC ROS:   negative for chest pain, dyspnea, palpitations, syncope, orthopnea, paroxysmal nocturnal dyspnea, exertional chest pressure/discomfort    Family History   Problem Relation Age of Onset    Heart Disease Father     Diabetes Father     Cancer Father      skin    Thyroid Disease Mother        Past Medical History:   Diagnosis Date    Atrial fibrillation ([facility] [address] 2011    ablation 2012    Diabetes ([facility] [address]     GERD (gastroesophageal reflux disease)     Hernia of unspecified site of abdominal cavity without mention of obstruction or gangrene repaired    Lipoma     PUD (peptic ulcer disease)     Status post Nissen fundoplication (without gastrostomy tube) procedure        GENERAL ROS:  A comprehensive review of systems was negative except for that written in the HPI. Visit Vitals    /90 (BP 1 Location: Right arm, BP Patient Position: Sitting)    Pulse (!) 115    Resp 20    Ht 6' (1.829 m)    Wt 227 lb (103 kg)    SpO2 99%    BMI 30.79 kg/m2       Wt Readings from Last 3 Encounters:   05/30/18 227 lb (103 kg)   04/20/18 231 lb 1 oz (104.8 kg)   07/13/17 223 lb (101.2 kg)            BP Readings from Last 3 Encounters:   05/30/18 120/90   04/20/18 106/82   07/13/17 142/82       PHYSICAL EXAM  General appearance: alert, cooperative, no distress, appears stated age  Neck: supple, symmetrical, trachea midline, no adenopathy, no carotid bruit and no JVD  Lungs: clear to auscultation bilaterally  Heart: regular rate and rhythm, S1, S2 normal, no murmur, click, rub or gallop  Extremities: extremities normal, atraumatic, no cyanosis or edema      ASSESSMENT  I agree that Eliquis is a good idea at this point until we are certain that he is not having any paroxysmal atrial fibrillation given his clinical presentation in [place]. He has an appointment to see Dr. Flavio Hart on 06/21/2018. We are going to place an event monitor on him now and it may be that Dr. Flavio Hart will recommend an implantable loop going forward since we have been unsuccessful capturing atrial fibrillation with event monitors in the past.           current treatment plan is effective, no change in therapy  lab results and schedule of future lab studies reviewed with patient  reviewed diet, exercise and weight control    Encounter Diagnoses   Name Primary?     Paroxysmal atrial fibrillation (HCC) Yes    Symptomatic PVCs     SVT (supraventricular tachycardia) (HCC)     Palpitations     Family history of coronary artery disease      Orders Placed This Encounter    atorvastatin (LIPITOR) 20 mg tablet    ELIQUIS 5 mg tablet       Follow-up Disposition:  Return in about 1 year (around 5/30/2019).     Dillon Boss MD  5/30/2018

## 2018-05-30 NOTE — MR AVS SNAPSHOT
[address] [facility] [address] [place] 
[phone] Patient: Amy Durand MRN: [id number] :1980 Visit Information Date & Time Provider Department Dept. Phone Encounter #  
 2018  3:20 PM Armando Fernandez MD [facility] Zaheer Tapia [phone] [id number] Follow-up Instructions Return in about 1 year (around 2019). Your Appointments 2018  2:00 PM  
ESTABLISHED PATIENT with Brandy Mcdonough MD  
[facility] ([facility]) Appt Note: ED f/u  
 [address]  [address] [place] [address]  
[address] [address] Upcoming Health Maintenance Date Due DTaP/Tdap/Td series (1 - Tdap) 1999 Influenza Age 5 to Adult 2018 Allergies as of 2018  Review Complete On: 2018 By: Claudio Erwin RN No Known Allergies Current Immunizations  Reviewed on 2012 No immunizations on file. Not reviewed this visit You Were Diagnosed With   
  
 Codes Comments Paroxysmal atrial fibrillation (HCC)    -  Primary ICD-10-CM: I48.0 ICD-9-CM: 427.31 Symptomatic PVCs     ICD-10-CM: I49.3 ICD-9-CM: 427.69   
 SVT (supraventricular tachycardia) (HCC)     ICD-10-CM: I47.1 ICD-9-CM: 427.89 Palpitations     ICD-10-CM: R00.2 ICD-9-CM: 785.1 Family history of coronary artery disease     ICD-10-CM: Z82.49 
ICD-9-CM: V17.3 Vitals BP Pulse Resp Height(growth percentile) Weight(growth percentile) SpO2  
 120/90 (BP 1 Location: Right arm, BP Patient Position: Sitting) (!) 115 20 6' (1.829 m) 227 lb (103 kg) 99% BMI Smoking Status 30.79 kg/m2 Former Smoker Vitals History BMI and BSA Data Body Mass Index Body Surface Area 30.79 kg/m 2 2.29 m 2 Preferred Pharmacy Pharmacy Name Phone [address] [place] [address] [phone] Your Updated Medication List  
  
   
This list is accurate as of 5/30/18  4:18 PM.  Always use your most recent med list.  
  
  
  
  
 atorvastatin 20 mg tablet Commonly known as:  LIPITOR  
  
 ELIQUIS 5 mg tablet Generic drug:  apixaban Take 1 Tab by mouth two (2) times a day. KlonoPIN 0.5 mg tablet Generic drug:  clonazePAM  
Take 0.5 mg by mouth daily. Taking one half tablet at night. As of 4-14-17, taking every am.  
  
 metFORMIN  mg tablet Commonly known as:  GLUCOPHAGE XR Take 500 mg by mouth two (2) times a day. Prescriptions Sent to Pharmacy Refills ELIQUIS 5 mg tablet 5 Sig: Take 1 Tab by mouth two (2) times a day. Class: Normal  
 Pharmacy: [address] [place] [address] #: [phone] Route: Oral  
  
We Performed the Following REFERRAL TO NEUROLOGY [KOZ90 Custom] Comments: TIAs Follow-up Instructions Return in about 1 year (around 5/30/2019). Referral Information Referral ID Referred By Referred To  
  
 [id number] [address], [address] Phone: [phone] Fax: [phone] Visits Status Start Date End Date 1 Authorized 5/30/18 5/30/19 If your referral has a status of pending review or denied, additional information will be sent to support the outcome of this decision. Introducing [facility] & HEALTH SERVICES! Dear Laisha Tucker: Thank you for requesting a [employer] account. Our records indicate that you already have an active [employer] account. You can access your account anytime at [email or web address] Did you know that you can access your hospital and ER discharge instructions at any time in [employer]? You can also review all of your test results from your hospital stay or ER visit. Additional Information If you have questions, please visit the Frequently Asked Questions section of the [employer] website at [email or web address] Remember, [employer] is NOT to be used for urgent needs. For medical emergencies, dial 911. Now available from your iPhone and Android! Please provide this summary of care documentation to your next provider. Your primary care clinician is listed as Katlyn Saavedra. If you have any questions after today's visit, please call [phone].

## 2018-05-30 NOTE — LETTER
5/30/2018 2:50 PM 
 
Mr. Thad Alva [address] [address] To Whom It May Concern: 
 
Thad Alva is currently under the care of [address] [facility]. From a cardiac standpoint, Mr. Lucie Hernandez may return to work without restrictions on June 4, 2018. If there are questions or concerns please have the patient contact our office. Sincerely, Jose G Delong MD

## 2018-06-13 ENCOUNTER — OFFICE VISIT (OUTPATIENT)
Dept: NEUROLOGY | Age: 38
End: 2018-06-13

## 2018-06-13 VITALS
WEIGHT: 232 LBS | SYSTOLIC BLOOD PRESSURE: 110 MMHG | BODY MASS INDEX: 31.42 KG/M2 | RESPIRATION RATE: 14 BRPM | HEART RATE: 76 BPM | DIASTOLIC BLOOD PRESSURE: 60 MMHG | HEIGHT: 72 IN | OXYGEN SATURATION: 97 %

## 2018-06-13 DIAGNOSIS — G45.0 VERTEBROBASILAR ARTERY SYNDROME: Primary | ICD-10-CM

## 2018-06-13 NOTE — MR AVS SNAPSHOT
[facility] [address] 
[phone] Patient: Ld Marina MRN: [id number] :1980 Visit Information Date & Time Provider Department Dept. Phone Encounter #  
 2018  9:00 AM Desmond Rea MD [facility] at [address] [id number] Follow-up Instructions Return if symptoms worsen or fail to improve. Your Appointments 2018  2:00 PM  
ESTABLISHED PATIENT with Jordon Lopez MD  
[facility] ([address]) Appt Note: ED f/u  
 [address] [facility] [address]  
[address] [facility] [address] Upcoming Health Maintenance Date Due DTaP/Tdap/Td series (1 - Tdap) 1999 Influenza Age 5 to Adult 2018 Allergies as of 2018  Review Complete On: 2018 By: Desmond Rea MD  
 No Known Allergies Current Immunizations  Reviewed on 2012 No immunizations on file. Not reviewed this visit You Were Diagnosed With   
  
 Codes Comments Vertebrobasilar artery syndrome    -  Primary ICD-10-CM: G45.0 ICD-9-CM: 435.3 Vitals BP Pulse Resp Height(growth percentile) Weight(growth percentile) SpO2  
 110/60 76 14 6' (1.829 m) 232 lb (105.2 kg) 97% BMI Smoking Status 31.46 kg/m2 Former Smoker Vitals History BMI and BSA Data Body Mass Index Body Surface Area  
 31.46 kg/m 2 2.31 m 2 Preferred Pharmacy Pharmacy Name Phone [address] [address] [phone] Your Updated Medication List  
  
   
This list is accurate as of 18  9:06 AM.  Always use your most recent med list.  
  
  
  
  
 atorvastatin 20 mg tablet Commonly known as:  LIPITOR  
  
 ELIQUIS 5 mg tablet Generic drug:  apixaban Take 1 Tab by mouth two (2) times a day. KlonoPIN 0.5 mg tablet Generic drug:  clonazePAM  
Take 0.5 mg by mouth daily. Taking one half tablet at night. As of 4-14-17, taking every am.  
  
 metFORMIN  mg tablet Commonly known as:  GLUCOPHAGE XR Take 500 mg by mouth two (2) times a day. Follow-up Instructions Return if symptoms worsen or fail to improve. To-Do List   
 06/18/2018 Imaging:  CTA HEAD   
  
 06/18/2018 Imaging:  CTA NECK Introducing [facility] SERVICES! Dear Thomas Soto: Thank you for requesting a [employer] account. Our records indicate that you already have an active [employer] account. You can access your account anytime at [email or web address] [email or web address]. Ticket ABC/[employer] Did you know that you can access your hospital and ER discharge instructions at any time in [employer]? You can also review all of your test results from your hospital stay or ER visit. Additional Information If you have questions, please visit the Frequently Asked Questions section of the [employer] website at [email or web address] [email or web address]/[email or web address]/. Remember, [employer] is NOT to be used for urgent needs. For medical emergencies, dial 911. Now available from your iPhone and Android! Please provide this summary of care documentation to your next provider. Your primary care clinician is listed as Ambrosio Allen. If you have any questions after today's visit, please call [phone].

## 2018-06-13 NOTE — PROGRESS NOTES
Chief Complaint   Patient presents with    Neurologic Problem         HISTORY OF PRESENT ILLNESS  Amanda Butcher is a 42 y.o. 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 He was feeling lightheaded. His wife was with him and she says that he looked okay and was talking fine. There was no focal motor or sensory weakness or facial asymmetry. As they were trying to leave, he could not see out of his right eye. This persisted for several minutes and they decided to go to the emergency department. By the time he got there his vision had started to clear up but it was flipping. It took almost 24 hours for the vision to clear up. When he arrived in the emergency room he did get a mild headache for which he took some Tylenol and it went away. He denies any history of headaches or migraines in the past.  He had an extensive workup including MRI of the brain, echocardiogram, EEG and Carotid Doppler and all of these were negative. He has history of atrial flutter, status post ablation. He has been  evaluated by cardiology and paroxysmal atrial fibrillation was suspected and he was started on Eliquis. He also has an extended cardiac monitor in place. Past Medical History:   Diagnosis Date    Atrial fibrillation [facility]) 2011    ablation 2012    Diabetes ([facility] [address]     Diabetes ([facility] [address]     GERD (gastroesophageal reflux disease)     Hernia of unspecified site of abdominal cavity without mention of obstruction or gangrene     repaired    Lipoma     PUD (peptic ulcer disease)     Status post Nissen fundoplication (without gastrostomy tube) procedure      Current Outpatient Prescriptions   Medication Sig    atorvastatin (LIPITOR) 20 mg tablet     ELIQUIS 5 mg tablet Take 1 Tab by mouth two (2) times a day.  metFORMIN ER (GLUCOPHAGE XR) 500 mg tablet Take 500 mg by mouth two (2) times a day.  clonazePAM (KLONOPIN) 0.5 mg tablet Take 0.5 mg by mouth daily. Taking one half tablet at night. As of 4-14-17, taking every am.     No current facility-administered medications for this visit. No Known Allergies  Family History   Problem Relation Age of Onset    Heart Disease Father     Diabetes Father     Cancer Father      skin    Thyroid Disease Mother      Social History   Substance Use Topics    Smoking status: Former Smoker     Packs/day: 1.00     Years: 20.00     Quit date: 04/2017    Smokeless tobacco: Current User    Alcohol use No     Past Surgical History:   Procedure Laterality Date    ABLATION OF SVT  4/25/2012         EP STUDY W/INDUCTION  2005    at [facility] [address] W/INDUCTION  4/25/2012         HX GI  2005, 2011    Nissen    HX HEENT Bilateral     LASIK    HX HERNIA REPAIR Bilateral 2016    inguinal    ISOPROTERENOL NON-COMP CON  4/25/2012         ID INTRACARDIAC ELECTROPHYSIOLOGIC 3D MAPPING  4/25/2012         ID UPPER GI ENDOSCOPY,REMOV F.B.  4/20/2018              REVIEW OF SYSTEMS  Review of Systems - History obtained from the patient  Psychological ROS: positive for - anxiety  ENT ROS: negative  Hematological and Lymphatic ROS: negative  Endocrine ROS: negative  Respiratory ROS: no cough, shortness of breath, or wheezing  Cardiovascular ROS: no chest pain or dyspnea on exertion  Gastrointestinal ROS: no abdominal pain, change in bowel habits, or black or bloody stools  Genito-Urinary ROS: no dysuria, trouble voiding, or hematuria  Musculoskeletal ROS: negative  Dermatological ROS: negative      PHYSICAL EXAMINATION:    Visit Vitals    /60    Pulse 76    Resp 14    Ht 6' (1.829 m)    Wt 105.2 kg (232 lb)    SpO2 97%    BMI 31.46 kg/m2     General:  Well defined, nourished, and groomed individual in no acute distress. Neck: Supple, nontender, thyroid within normal limits, no JVD, no bruits, no pain with resistance to active range of motion. Heart: Regular rate and rhythm, no murmurs, rub, or gallop. Normal S1S2. Lungs:  Clear to auscultation bilaterally with equal chest expansion, no cough, no wheeze  Musculoskeletal:  Extremities revealed no edema and had full range of motion of joints. Psych:  Good mood and bright affect    NEUROLOGICAL EXAMINATION:     Mental Status:   Alert and oriented to person, place, and time with recent and remote memory intact. Attention span and concentration are normal. Speech is fluent with a full fund of knowledge. Cranial Nerves:    II, III, IV, VI:  Visual acuity grossly intact. Visual fields are normal.    Pupils are equal, round, and reactive to light and accommodation. Extra-ocular movements are full and fluid. Fundoscopic exam was benign, no ptosis or nystagmus. V-XII: Hearing is grossly intact. Facial features are symmetric, with normal sensation and strength. The palate rises symmetrically and the tongue protrudes midline. Sternocleidomastoids 5/5. Motor Examination: Normal tone, bulk, and strength. 5/5 muscle strength throughout. No cogwheel rigidity or clonus present. Sensory exam:  Normal throughout to pinprick, temperature, and vibration sense. Normal proprioception. Coordination:  Finger to nose and rapid arm movement testing was normal.   No resting or intention tremor    Gait and Station:  Steady while walking on toes, heels, and with tandem walking. Normal arm swing. No Rhomberg or pronator drift. No muscle wasting or fasiculations noted. Reflexes:  DTRs 2+ throughout. Toes downgoing.         LABS / IMAGING  MRI brain was negative  EEG was normal  Carotid Doppler was negative  Echocardiogram was negative had sudden onset of    ASSESSMENT    ICD-10-CM ICD-9-CM    1. 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 Stephane Johnson had an abrupt onset of memory loss, followed within minutes by right-sided vision loss that persisted for about 30 minutes. He did get a mild headache that resolved with Tylenol. Complicated migraine remains a possibility but given his cardiac history, abruptness of the onset of symptoms and no previous history of headaches/migraines, I would suspect a vascular event to be more likely. Agree with maintaining him on Eliquis for now and consider extended cardiac monitoring possibly with the implantable loop recorder. Since his symptoms localized to the posterior circulation, will evaluate vertebrobasilar system with a CT of the head and neck    Thank you for allowing me to participate in the care of Mr. Ramirez Marcelino. Please feel free to contact me if you have any questions. I will be happy to follow to follow him along with you.       Kitty Sultana MD  Diplomate, American Board of Psychiatry & Neurology (Neurology)  Fernando Smiley Board of Psychiatry & Neurology (Clinical Neurophysiology)  Diplomate, American Board of Electrodiagnostic Medicine

## 2018-06-13 NOTE — LETTER
6/13/2018 9:40 AM 
 
Patient:  Sue Conroy YOB: 1980 Date of Visit: 6/13/2018 Dear Johnathan Faust MD 
[address] [address] VIA Facsimile: [phone] Suly Fleming MD 
[address] [address] VIA In Basket 
 : Thank you for referring Mr. Gail Garcia to me for evaluation/treatment. Below are the relevant portions of my assessment and plan of care. If you have questions, please do not hesitate to call me. I look forward to following Mr. Abiel Gann along with you. Sincerely, Olimpia Gonzalez MD

## 2018-06-21 ENCOUNTER — OFFICE VISIT (OUTPATIENT)
Dept: CARDIOLOGY CLINIC | Age: 38
End: 2018-06-21

## 2018-06-21 ENCOUNTER — HOSPITAL ENCOUNTER (OUTPATIENT)
Dept: CT IMAGING | Age: 38
Discharge: HOME | End: 2018-06-21
Attending: PSYCHIATRY & NEUROLOGY
Payer: COMMERCIAL

## 2018-06-21 VITALS
HEIGHT: 72 IN | DIASTOLIC BLOOD PRESSURE: 80 MMHG | SYSTOLIC BLOOD PRESSURE: 120 MMHG | OXYGEN SATURATION: 98 % | BODY MASS INDEX: 31.02 KG/M2 | HEART RATE: 84 BPM | WEIGHT: 229 LBS | RESPIRATION RATE: 16 BRPM

## 2018-06-21 DIAGNOSIS — Z01.812 PRE-PROCEDURE LAB EXAM: ICD-10-CM

## 2018-06-21 DIAGNOSIS — R00.2 PALPITATIONS: Primary | ICD-10-CM

## 2018-06-21 DIAGNOSIS — I47.1 SVT (SUPRAVENTRICULAR TACHYCARDIA) (HCC): ICD-10-CM

## 2018-06-21 DIAGNOSIS — G45.0 VERTEBROBASILAR ARTERY SYNDROME: ICD-10-CM

## 2018-06-21 DIAGNOSIS — I49.3 SYMPTOMATIC PVCS: ICD-10-CM

## 2018-06-21 DIAGNOSIS — G45.3 AMAUROSIS FUGAX: ICD-10-CM

## 2018-06-21 LAB — CREAT BLD-MCNC: 0.9 MG/DL (ref 0.6–1.3)

## 2018-06-21 PROCEDURE — 74011636320 HC RX REV CODE- 636/320: Performed by: RADIOLOGY

## 2018-06-21 PROCEDURE — 82565 ASSAY OF CREATININE: CPT

## 2018-06-21 PROCEDURE — 70498 CT ANGIOGRAPHY NECK: CPT

## 2018-06-21 RX ADMIN — IOPAMIDOL 100 ML: 755 INJECTION, SOLUTION INTRAVENOUS at 08:00

## 2018-06-21 NOTE — MR AVS SNAPSHOT
[address] [place] 
[phone] Patient: José Russell MRN: [id number] :1980 Visit Information Date & Time Provider Department Dept. Phone Encounter #  
 2018  2:00 PM Shanice Titus MD [facility] Franca Sena [phone] [id number] Your Appointments 2018 10:15 AM  
PACEMAKER with PACEMAKER3ANDI [facility] ([place] SCHEDULING) Appt Note: 1 week wound check [address]  [address] [place] [address]  
[address] [address]  
  
    
 2018 10:30 AM  
ESTABLISHED PATIENT with Ela Perkins [facility] ([place] SCHEDULING) Appt Note: 1 week wound check [address]  [address] [place] [address]  
[address] [address] Upcoming Health Maintenance Date Due DTaP/Tdap/Td series (1 - Tdap) 1999 Influenza Age 5 to Adult 2018 Allergies as of 2018  Review Complete On: 2018 By: Yajaira Valenzuela No Known Allergies Current Immunizations  Reviewed on 2012 No immunizations on file. Not reviewed this visit You Were Diagnosed With   
  
 Codes Comments Pre-procedure lab exam    -  Primary ICD-10-CM: B84.523 ICD-9-CM: V72.63 Palpitations     ICD-10-CM: R00.2 ICD-9-CM: 785.1 Vitals BP Pulse Resp Height(growth percentile) Weight(growth percentile) SpO2  
 120/80 (BP 1 Location: Left arm, BP Patient Position: Sitting) 84 16 6' (1.829 m) 229 lb (103.9 kg) 98% BMI Smoking Status 31.06 kg/m2 Former Smoker Vitals History BMI and BSA Data Body Mass Index Body Surface Area 31.06 kg/m 2 2.3 m 2 Preferred Pharmacy Pharmacy Name Phone [address] [address] [phone] Your Updated Medication List  
  
   
This list is accurate as of 6/21/18  2:47 PM.  Always use your most recent med list.  
  
  
  
  
 atorvastatin 20 mg tablet Commonly known as:  LIPITOR  
  
 ELIQUIS 5 mg tablet Generic drug:  apixaban Take 1 Tab by mouth two (2) times a day. KlonoPIN 0.5 mg tablet Generic drug:  clonazePAM  
Take 0.5 mg by mouth daily. Taking one half tablet at night. As of 4-14-17, taking every am.  
  
 metFORMIN  mg tablet Commonly known as:  GLUCOPHAGE XR Take 500 mg by mouth two (2) times a day. We Performed the Following CBC WITH AUTOMATED DIFF [63755 CPT(R)] METABOLIC PANEL, BASIC [63355 CPT(R)] To-Do List   
 07/16/2018 2:30 PM  
  Appointment with CATH ROOM 2 [address] [place] [address] at [address], [facility]  [facility] LAB ([phone]) Patient Instructions Your ILR procedure has been scheduled for 7/16/18 at 2:30 pm, at [facility]. 
 
Please report to Admitting Department by 12:30 pm, or 2 hours prior to your scheduled procedure. Please bring a list of your current medications and medication bottles, if able, to the hospital on this day. You will be unable to drive after your procedure so please make sure to bring someone with you to your procedure. You will need labs drawn prior to your procedure. Please go to [facility] to have this done as soon as you are able. You should not stop your medication. After your procedure, you will need to follow up with Dr. Holli Torres nurse, Jen Pollard or Rory José, for a wound and device check. Your follow-up appointment has been scheduled for 7/20/18 at 10:15 am.  
 
 
 
 
 
  
Introducing [facility] & HEALTH SERVICES! Dear Deisy Huynh: Thank you for requesting a [employer] account. Our records indicate that you already have an active [employer]ell[employer] account. You can access your account anytime at [email or web address] Did you know that you can access your hospital and ER discharge instructions at any time in [email or web address]? You can also review all of your test results from your hospital stay or ER visit. Additional Information If you have questions, please visit the Frequently Asked Questions section of the [email or web address] website at [email or web address] Remember, [email or web address].com is NOT to be used for urgent needs. For medical emergencies, dial 911. Now available from your iPhone and Android! Please provide this summary of care documentation to your next provider. Your primary care clinician is listed as NONE. If you have any questions after today's visit, please call [phone].

## 2018-06-21 NOTE — PATIENT INSTRUCTIONS
Your ILR procedure has been scheduled for 7/16/18 at 2:30 pm, at [facility].    Please report to Admitting Department by 12:30 pm, or 2 hours prior to your scheduled procedure. Please bring a list of your current medications and medication bottles, if able, to the hospital on this day. You will be unable to drive after your procedure so please make sure to bring someone with you to your procedure. You will need labs drawn prior to your procedure. Please go to [facility] to have this done as soon as you are able. You should not stop your medication. After your procedure, you will need to follow up with Dr. Estefania Guardado nurse, Starla Rodriguez or Alyssa Nevarez, for a wound and device check.  Your follow-up appointment has been scheduled for 7/20/18 at 10:15 am.

## 2018-06-22 NOTE — PROGRESS NOTES
[facility]    Subjective:      Corky Nj is a 42 y.o. male who is seen for recent TIA and loss of right eye vision temporarily 5/24/2018. He went to [address] [place] [address] ER  Head CT was negative  CT with contrast done today but no result yet  I was not in office so he saw Dr Azalia Townsend 5/2018 and is back to see me today  He is told to see me for AFIB related TIA  He is using external loop recorder now and his skin is red, irritating    In the past I had seen him for evaluation of palpitation and PVC. He had external loop monitor 4/2016 with PAC and PVC  He had been seen in August 2014  He had rare PVC in the past  When he went to outside ER and said he saw ventricular bigeminy     s/p EP for PVCs 6/23/17. There were no PVC to map and ablate. No inducible atrial fibrillation. He stopped Cardizem and started Toprol . Since the procedure he has been to ED twice  ([address] [place] then transferred to [place]) for palpitations with associated chest pain and SOB. He stopped taking the Toprol last week and since then he has actually had less palpitations. He would prefer to not take medications. His says his BP is okay at home. He had been using Inderal back in 2005 when he had tachycardia and was studied in EP lab at [facility] and was told there was nothing inducible  He then came to [facility] with AFIB and ECG confirmed that in 7/2011 and echo there showed normal heart with possible mild LAE  He had GERD and 2 surgeries so he did EP study with me  4/25/2012 EP and ablation  1. There was no retrograde VA conduction, no evidence of accessory pathway  2. Normal AV herberth antegrade conduction  3. Dual AV herberth physiology with AV herberth echo single beat was present but there was no AVNRT so this was not necessary to ablate  4. Atrial flutter was present and triggered atrial fibrillation. Mapping showed counterclockwise cavotricuspid isthmus atrial flutter.  The isthmus was ablated and bidirectional line of block was obtained. There was no further inducible arrhythmia (atrial tachycardia, flutter or fibrillation) obtained after ablation despite aggressive pacing protocol (at multiple RA and LA via CS sites) with and without isuprel up to the highest allowable dose, with and without anesthetic agent/sedation      Problem List  Date Reviewed: 6/21/2018          Codes Class Noted    Symptomatic PVCs ICD-10-CM: I49.3  ICD-9-CM: 427.69  6/24/2017        Palpitations ICD-10-CM: R00.2  ICD-9-CM: 785.1  6/10/2016    Overview Signed 6/10/2016  8:22 AM by Laura Puente MD     4/16 event monitor, nsr, sinus tach, pacs, pvcs             Paroxysmal atrial fibrillation ([facility] ICD-10-CM: I48.0  ICD-9-CM: 427.31  7/19/2011    Overview Addendum 4/25/2012  1:44 PM by Celestina Griffin MD     7/11 converted spontaneously in [facility] er. Normal echo at that time. 4/25/2012: [address], comprehensive EP study with 3 D mapping under conscious sedation and then general anesthesia  atrial flutter trigerring atrial fibrillation without isuprel were inducible with programmed stimulation at 1 extrastimulus, mapping showed right atrial flutter and ablation at the cavotricuspid isthmus were done. No further atrial flutter/tachycardia, fibrillation were inducible after ablation despite isuprel to wide open amount and burst rapid pacing to 2:1 AV block (240 ms), incremental and programmed atrial stimulation up to 3 extrastimuli to 200 ms at mid, high, low RA, prox CS, mid CS. Distal CS pacing did not capture. Dual AV herberth physiology is noted with one AV herberth echo beats but there was no AVNRT so this was not ablated.     Atrial fibrillation ablation was not done because this was not the primary arrhythmia             SVT (supraventricular tachycardia) ([address] ICD-10-CM: I47.1  ICD-9-CM: 427.89  7/19/2011    Overview Signed 7/19/2011  4:44 PM by Laura Puente MD     approx 2006, with neg ep study and stress test at [facility]. Subsequently treated with inderal.             Family history of coronary artery disease ICD-10-CM: Z82.49  ICD-9-CM: V17.3  7/19/2011              Current Outpatient Prescriptions   Medication Sig Dispense Refill    atorvastatin (LIPITOR) 20 mg tablet       ELIQUIS 5 mg tablet Take 1 Tab by mouth two (2) times a day. 60 Tab 5    metFORMIN ER (GLUCOPHAGE XR) 500 mg tablet Take 500 mg by mouth two (2) times a day.  clonazePAM (KLONOPIN) 0.5 mg tablet Take 0.5 mg by mouth daily. Taking one half tablet at night. As of 4-14-17, taking every am.       No Known Allergies  Past Medical History:   Diagnosis Date    Atrial fibrillation ([address] 2011    ablation 2012    Diabetes ([address]     Diabetes [facility]     GERD (gastroesophageal reflux disease)     Hernia of unspecified site of abdominal cavity without mention of obstruction or gangrene     repaired    Lipoma     PUD (peptic ulcer disease)     Status post Nissen fundoplication (without gastrostomy tube) procedure      Past Surgical History:   Procedure Laterality Date    ABLATION OF SVT  4/25/2012         EP STUD[facility] W[facility]INDUCTION  [facility]    EP STUDY W/INDUCTION  4/25/2012         HX GI  2005, 2011    Nissen    HX HEENT Bilateral     LASIK    HX HERNIA REPAIR Bilateral 2016    inguinal    ISOPROTERENOL NON-COMP CON  4/25/2012         SC INTRACARDIAC ELECTROPHYSIOLOGIC 3D MAPPING  4/25/2012         SC UPPER GI ENDOSCOPY,REMOV F.B.  4/20/2018          No family history of AFIB or arrhythmia, just CAD    Social History   Substance Use Topics    Smoking status: Former Smoker     Packs/day: 1.00     Years: 20.00     Quit date: 04/2017    Smokeless tobacco: Current User    Alcohol use No      Review of Systems    Constitutional: Negative for fever, chills, weight loss  HEENT: Negative for nosebleeds  Respiratory: Negative for cough, hemoptysis, sputum production, and wheezing.    Cardiovascular: Negative for chest pain, orthopnea, claudication, leg swelling, syncope, and PND. Gastrointestinal: Negative for nausea, vomiting, diarrhea, blood in stool and melena. Genitourinary: Negative for dysuria, urgency, frequency and hematuria. Musculoskeletal: Negative for myalgias. Skin: Negative for rash and itching. Heme: Does not bleed or bruise easily. Neurological: Negative for speech change and focal weakness      Objective:     Visit Vitals    /80 (BP 1 Location: Left arm, BP Patient Position: Sitting)    Pulse 84    Resp 16    Ht 6' (1.829 m)    Wt 229 lb (103.9 kg)    SpO2 98%    BMI 31.06 kg/m2      Physical Exam:   General:  alert, cooperative, no distress, appears stated age   Neck: no bruits, no JVD   Chest Wall: respiratory effort normal   Lung: clear to auscultation bilaterally   Heart:  normal rate, regular rhythm, no murmurs, rubs, clicks or gallops   Abdomen:  soft, non-tender   Extremities: No edema       Assessment/Plan:       ICD-10-CM ICD-9-CM    1. Palpitations R00.2 785.1 CBC WITH AUTOMATED DIFF      METABOLIC PANEL, BASIC   2. Pre-procedure lab exam Z01.812 V72.63 CBC WITH AUTOMATED DIFF      METABOLIC PANEL, BASIC   3. SVT (supraventricular tachycardia) (HCC) I47.1 427.89    4. Symptomatic PVCs I49.3 427.69    5.  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 He agrees, he does not want medication, had a hard time to tolerate before  My nurse will contact him once approved for implantable loop recorder    Manda Barriga M.D.  [facility]  Electrophysiology/Cardiology  [facility] [place]  [address] , [address]  [phone]

## 2018-06-28 ENCOUNTER — DOCUMENTATION ONLY (OUTPATIENT)
Dept: CARDIOLOGY CLINIC | Age: 38
End: 2018-06-28

## 2018-06-28 NOTE — PROGRESS NOTES
Loop monitor has shown recurrent PSVT or atrial flutter RVR    Recommend Multaq 400 mg BID  Continue with Eliquis  Will have my nurse call make appt and plan for ablation with patient  EP study 6/23/2017 showed dual AV node but no AVNRT so that was not ablated.  He may have atrial flutter or atrial tach or AVNRT  There is concern for AFIB at [facility] so this needs to be ablated and studied as well

## 2018-06-29 DIAGNOSIS — I48.0 PAROXYSMAL ATRIAL FIBRILLATION (HCC): Primary | ICD-10-CM

## 2018-06-29 NOTE — PROGRESS NOTES
Verified patient with two types of identifiers. Advised patient of the loop results and the procedure. Scheduled EPS & A-fib ablation for 8/3/18 @ 1:00pm.  Mailed prep to patient. Patient verbalizes understanding. And will call with any questions or concerns. See other encounter for orders placed.

## 2018-07-06 ENCOUNTER — HOSPITAL ENCOUNTER (OUTPATIENT)
Dept: CT IMAGING | Age: 38
Discharge: HOME | End: 2018-07-06
Attending: INTERNAL MEDICINE
Payer: COMMERCIAL

## 2018-07-06 DIAGNOSIS — I48.0 PAROXYSMAL ATRIAL FIBRILLATION (HCC): ICD-10-CM

## 2018-07-06 PROCEDURE — 71275 CT ANGIOGRAPHY CHEST: CPT

## 2018-07-06 PROCEDURE — 74011636320 HC RX REV CODE- 636/320: Performed by: RADIOLOGY

## 2018-07-06 RX ADMIN — IOPAMIDOL 100 ML: 755 INJECTION, SOLUTION INTRAVENOUS at 07:38

## 2018-07-09 ENCOUNTER — TELEPHONE (OUTPATIENT)
Dept: CARDIOLOGY CLINIC | Age: 38
End: 2018-07-09

## 2018-07-09 NOTE — TELEPHONE ENCOUNTER
Patient's end of service event monitor report is on your desk for review. He has seen Dr. Humphrey Finley and is following up with Dr. Humphrey Finley.

## 2018-07-10 LAB
BASOPHILS # BLD AUTO: 0 X10E3/UL (ref 0–0.2)
BASOPHILS NFR BLD AUTO: 0 %
BUN SERPL-MCNC: 11 MG/DL (ref 6–20)
BUN/CREAT SERPL: 10 (ref 9–20)
CALCIUM SERPL-MCNC: 9.5 MG/DL (ref 8.7–10.2)
CHLORIDE SERPL-SCNC: 101 MMOL/L (ref 96–106)
CO2 SERPL-SCNC: 24 MMOL/L (ref 20–29)
CREAT SERPL-MCNC: 1.08 MG/DL (ref 0.76–1.27)
EOSINOPHIL # BLD AUTO: 0.1 X10E3/UL (ref 0–0.4)
EOSINOPHIL NFR BLD AUTO: 1 %
ERYTHROCYTE [DISTWIDTH] IN BLOOD BY AUTOMATED COUNT: 13 % (ref 12.3–15.4)
GLUCOSE SERPL-MCNC: 145 MG/DL (ref 65–99)
HCT VFR BLD AUTO: 46.3 % (ref 37.5–51)
HGB BLD-MCNC: 15.9 G/DL (ref 13–17.7)
IMM GRANULOCYTES # BLD: 0.1 X10E3/UL (ref 0–0.1)
IMM GRANULOCYTES NFR BLD: 1 %
LYMPHOCYTES # BLD AUTO: 3 X10E3/UL (ref 0.7–3.1)
LYMPHOCYTES NFR BLD AUTO: 31 %
MCH RBC QN AUTO: 31.3 PG (ref 26.6–33)
MCHC RBC AUTO-ENTMCNC: 34.3 G/DL (ref 31.5–35.7)
MCV RBC AUTO: 91 FL (ref 79–97)
MONOCYTES # BLD AUTO: 0.6 X10E3/UL (ref 0.1–0.9)
MONOCYTES NFR BLD AUTO: 7 %
NEUTROPHILS # BLD AUTO: 5.8 X10E3/UL (ref 1.4–7)
NEUTROPHILS NFR BLD AUTO: 60 %
PLATELET # BLD AUTO: 245 X10E3/UL (ref 150–379)
POTASSIUM SERPL-SCNC: 4.2 MMOL/L (ref 3.5–5.2)
RBC # BLD AUTO: 5.08 X10E6/UL (ref 4.14–5.8)
SODIUM SERPL-SCNC: 142 MMOL/L (ref 134–144)
WBC # BLD AUTO: 9.6 X10E3/UL (ref 3.4–10.8)

## 2018-07-16 ENCOUNTER — TELEPHONE (OUTPATIENT)
Dept: CARDIOLOGY CLINIC | Age: 38
End: 2018-07-16

## 2018-07-16 NOTE — TELEPHONE ENCOUNTER
Verified patient with two types of identifiers. States that he still has issues with the heart rate being high. He would like to move up his procedure as he will not take any medications for the a-fib as he feels the risk does not out weigh the benefits. Advised him that the benefits do out way the risk but he will not take any. He is currently not taking Multaq. Will inform MD/NP.

## 2018-07-16 NOTE — TELEPHONE ENCOUNTER
Was in ED in [facility] with a-fib with RVR, non-compliant with medications. Per Dr Jimena Gonzalez need to call and check on today. Attempted to reach patient by telephone. A message was left for return call.

## 2018-07-16 NOTE — TELEPHONE ENCOUNTER
Advise that patient take medications as prescribed, will hopefully allow patient to avoid ED visits in the interim. Patient currently scheduled 08/03/2018, does not appear that there are openings prior to that date.

## 2018-07-27 RX ORDER — SODIUM CHLORIDE 0.9 % (FLUSH) 0.9 %
5-10 SYRINGE (ML) INJECTION AS NEEDED
Status: CANCELLED | OUTPATIENT
Start: 2018-07-27

## 2018-07-27 RX ORDER — SODIUM CHLORIDE 0.9 % (FLUSH) 0.9 %
5-10 SYRINGE (ML) INJECTION EVERY 8 HOURS
Status: CANCELLED | OUTPATIENT
Start: 2018-07-27

## 2018-08-02 ENCOUNTER — ANESTHESIA EVENT (OUTPATIENT)
Dept: NON INVASIVE DIAGNOSTICS | Age: 38
End: 2018-08-02
Payer: COMMERCIAL

## 2018-08-03 ENCOUNTER — ANESTHESIA (OUTPATIENT)
Dept: NON INVASIVE DIAGNOSTICS | Age: 38
End: 2018-08-03
Payer: COMMERCIAL

## 2018-08-03 ENCOUNTER — HOSPITAL ENCOUNTER (OUTPATIENT)
Dept: NON INVASIVE DIAGNOSTICS | Age: 38
Setting detail: OBSERVATION
Discharge: HOME | End: 2018-08-04
Attending: INTERNAL MEDICINE | Admitting: INTERNAL MEDICINE
Payer: COMMERCIAL

## 2018-08-03 PROBLEM — Z98.890 S/P ABLATION OF ATRIAL FIBRILLATION: Status: ACTIVE | Noted: 2018-08-03

## 2018-08-03 PROBLEM — Z86.79 S/P ABLATION OF ATRIAL FIBRILLATION: Status: ACTIVE | Noted: 2018-08-03

## 2018-08-03 LAB
ABO + RH BLD: NORMAL
ACT BLD: 120 SECS (ref 79–138)
ACT BLD: 180 SECS (ref 79–138)
ACT BLD: 235 SECS (ref 79–138)
ACT BLD: 246 SECS (ref 79–138)
ACT BLD: 263 SECS (ref 79–138)
ACT BLD: 279 SECS (ref 79–138)
BLOOD GROUP ANTIBODIES SERPL: NORMAL
GLUCOSE BLD STRIP.AUTO-MCNC: 132 MG/DL (ref 65–100)
GLUCOSE BLD STRIP.AUTO-MCNC: 139 MG/DL (ref 65–100)
SERVICE CMNT-IMP: ABNORMAL
SERVICE CMNT-IMP: ABNORMAL
SPECIMEN EXP DATE BLD: NORMAL

## 2018-08-03 PROCEDURE — 77030020268 HC MISC GENERAL SUPPLY

## 2018-08-03 PROCEDURE — 77030008684 HC TU ET CUF COVD -B: Performed by: ANESTHESIOLOGY

## 2018-08-03 PROCEDURE — C1759 CATH, INTRA ECHOCARDIOGRAPHY: HCPCS

## 2018-08-03 PROCEDURE — 93662 INTRACARDIAC ECG (ICE): CPT

## 2018-08-03 PROCEDURE — 77030015398 HC CBL EP EXT STJU -C

## 2018-08-03 PROCEDURE — 99152 MOD SED SAME PHYS/QHP 5/>YRS: CPT

## 2018-08-03 PROCEDURE — 74011000250 HC RX REV CODE- 250

## 2018-08-03 PROCEDURE — C1766 INTRO/SHEATH,STRBLE,NON-PEEL: HCPCS

## 2018-08-03 PROCEDURE — 77030018733 HC ELECTRD KT ENSITE STJU -G

## 2018-08-03 PROCEDURE — 77030016898 HC CBL EP EXT3 STJU -B

## 2018-08-03 PROCEDURE — C2630 CATH, EP, COOL-TIP: HCPCS

## 2018-08-03 PROCEDURE — 77030011640 HC PAD GRND REM COVD -A

## 2018-08-03 PROCEDURE — 85347 COAGULATION TIME ACTIVATED: CPT

## 2018-08-03 PROCEDURE — 99218 HC RM OBSERVATION: CPT

## 2018-08-03 PROCEDURE — 77030033352 HC TBNG IRR PMP COOL PNT STJU -B

## 2018-08-03 PROCEDURE — 86900 BLOOD TYPING SEROLOGIC ABO: CPT | Performed by: INTERNAL MEDICINE

## 2018-08-03 PROCEDURE — 77030016899 HC CBL EP EXT4 BSC -B

## 2018-08-03 PROCEDURE — C1894 INTRO/SHEATH, NON-LASER: HCPCS

## 2018-08-03 PROCEDURE — 74011250636 HC RX REV CODE- 250/636

## 2018-08-03 PROCEDURE — C1893 INTRO/SHEATH, FIXED,NON-PEEL: HCPCS

## 2018-08-03 PROCEDURE — 77030034850

## 2018-08-03 PROCEDURE — C1731 CATH, EP, 20 OR MORE ELEC: HCPCS

## 2018-08-03 PROCEDURE — 93613 INTRACARDIAC EPHYS 3D MAPG: CPT

## 2018-08-03 PROCEDURE — 36415 COLL VENOUS BLD VENIPUNCTURE: CPT | Performed by: INTERNAL MEDICINE

## 2018-08-03 PROCEDURE — 74011250636 HC RX REV CODE- 250/636: Performed by: INTERNAL MEDICINE

## 2018-08-03 PROCEDURE — 77030005401 HC CATH RAD ARRO -A

## 2018-08-03 PROCEDURE — 93656 COMPRE EP EVAL ABLTJ ATR FIB: CPT

## 2018-08-03 PROCEDURE — C1730 CATH, EP, 19 OR FEW ELECT: HCPCS

## 2018-08-03 PROCEDURE — 77030020506 HC NDL TRNSPTL NRG BAYL -F

## 2018-08-03 PROCEDURE — 77030019908 HC STETH ESOPH SIMS -A: Performed by: ANESTHESIOLOGY

## 2018-08-03 PROCEDURE — 82962 GLUCOSE BLOOD TEST: CPT

## 2018-08-03 PROCEDURE — 99153 MOD SED SAME PHYS/QHP EA: CPT

## 2018-08-03 PROCEDURE — 76060000040 HC ANESTHESIA 4.5 TO 5 HR

## 2018-08-03 RX ORDER — HEPARIN SODIUM 200 [USP'U]/100ML
500 INJECTION, SOLUTION INTRAVENOUS ONCE
Status: DISCONTINUED | OUTPATIENT
Start: 2018-08-03 | End: 2018-08-03

## 2018-08-03 RX ORDER — ZOLPIDEM TARTRATE 5 MG/1
5 TABLET ORAL
Status: DISCONTINUED | OUTPATIENT
Start: 2018-08-03 | End: 2018-08-04 | Stop reason: HOSPADM

## 2018-08-03 RX ORDER — ACETAMINOPHEN 325 MG/1
650 TABLET ORAL
Status: DISCONTINUED | OUTPATIENT
Start: 2018-08-03 | End: 2018-08-04 | Stop reason: HOSPADM

## 2018-08-03 RX ORDER — SODIUM CHLORIDE 0.9 % (FLUSH) 0.9 %
5-10 SYRINGE (ML) INJECTION EVERY 8 HOURS
Status: DISCONTINUED | OUTPATIENT
Start: 2018-08-03 | End: 2018-08-04 | Stop reason: HOSPADM

## 2018-08-03 RX ORDER — ADENOSINE 3 MG/ML
6-24 INJECTION, SOLUTION INTRAVENOUS
Status: DISCONTINUED | OUTPATIENT
Start: 2018-08-03 | End: 2018-08-03

## 2018-08-03 RX ORDER — HYDROCODONE BITARTRATE AND ACETAMINOPHEN 5; 325 MG/1; MG/1
1 TABLET ORAL
Status: DISCONTINUED | OUTPATIENT
Start: 2018-08-03 | End: 2018-08-04 | Stop reason: HOSPADM

## 2018-08-03 RX ORDER — SODIUM CHLORIDE 0.9 % (FLUSH) 0.9 %
5-10 SYRINGE (ML) INJECTION AS NEEDED
Status: DISCONTINUED | OUTPATIENT
Start: 2018-08-03 | End: 2018-08-04 | Stop reason: HOSPADM

## 2018-08-03 RX ORDER — NEOSTIGMINE METHYLSULFATE 1 MG/ML
INJECTION INTRAVENOUS AS NEEDED
Status: DISCONTINUED | OUTPATIENT
Start: 2018-08-03 | End: 2018-08-03 | Stop reason: HOSPADM

## 2018-08-03 RX ORDER — PHENYLEPHRINE HCL IN 0.9% NACL 0.4MG/10ML
SYRINGE (ML) INTRAVENOUS AS NEEDED
Status: DISCONTINUED | OUTPATIENT
Start: 2018-08-03 | End: 2018-08-03 | Stop reason: HOSPADM

## 2018-08-03 RX ORDER — LIDOCAINE HYDROCHLORIDE 20 MG/ML
INJECTION, SOLUTION EPIDURAL; INFILTRATION; INTRACAUDAL; PERINEURAL AS NEEDED
Status: DISCONTINUED | OUTPATIENT
Start: 2018-08-03 | End: 2018-08-03 | Stop reason: HOSPADM

## 2018-08-03 RX ORDER — METFORMIN HYDROCHLORIDE 500 MG/1
500 TABLET, EXTENDED RELEASE ORAL 2 TIMES DAILY
Status: DISCONTINUED | OUTPATIENT
Start: 2018-08-03 | End: 2018-08-04 | Stop reason: HOSPADM

## 2018-08-03 RX ORDER — HEPARIN SODIUM 1000 [USP'U]/ML
1000 INJECTION, SOLUTION INTRAVENOUS; SUBCUTANEOUS ONCE
Status: COMPLETED | OUTPATIENT
Start: 2018-08-03 | End: 2018-08-03

## 2018-08-03 RX ORDER — SODIUM CHLORIDE 9 MG/ML
INJECTION, SOLUTION INTRAVENOUS
Status: DISCONTINUED | OUTPATIENT
Start: 2018-08-03 | End: 2018-08-03 | Stop reason: HOSPADM

## 2018-08-03 RX ORDER — FENTANYL CITRATE 50 UG/ML
INJECTION, SOLUTION INTRAMUSCULAR; INTRAVENOUS AS NEEDED
Status: DISCONTINUED | OUTPATIENT
Start: 2018-08-03 | End: 2018-08-03 | Stop reason: HOSPADM

## 2018-08-03 RX ORDER — ONDANSETRON 2 MG/ML
4 INJECTION INTRAMUSCULAR; INTRAVENOUS
Status: DISCONTINUED | OUTPATIENT
Start: 2018-08-03 | End: 2018-08-04 | Stop reason: HOSPADM

## 2018-08-03 RX ORDER — CLONAZEPAM 0.5 MG/1
0.5 TABLET ORAL DAILY
Status: DISCONTINUED | OUTPATIENT
Start: 2018-08-04 | End: 2018-08-04 | Stop reason: HOSPADM

## 2018-08-03 RX ORDER — NALOXONE HYDROCHLORIDE 1 MG/ML
0.4 INJECTION INTRAMUSCULAR; INTRAVENOUS; SUBCUTANEOUS AS NEEDED
Status: DISCONTINUED | OUTPATIENT
Start: 2018-08-03 | End: 2018-08-04 | Stop reason: HOSPADM

## 2018-08-03 RX ORDER — PROPOFOL 10 MG/ML
INJECTION, EMULSION INTRAVENOUS AS NEEDED
Status: DISCONTINUED | OUTPATIENT
Start: 2018-08-03 | End: 2018-08-03 | Stop reason: HOSPADM

## 2018-08-03 RX ORDER — ATROPINE SULFATE 0.1 MG/ML
1 INJECTION INTRAVENOUS ONCE
Status: DISCONTINUED | OUTPATIENT
Start: 2018-08-03 | End: 2018-08-03

## 2018-08-03 RX ORDER — FENTANYL CITRATE 50 UG/ML
INJECTION, SOLUTION INTRAMUSCULAR; INTRAVENOUS
Status: COMPLETED
Start: 2018-08-03 | End: 2018-08-03

## 2018-08-03 RX ORDER — SUCCINYLCHOLINE CHLORIDE 20 MG/ML
INJECTION INTRAMUSCULAR; INTRAVENOUS AS NEEDED
Status: DISCONTINUED | OUTPATIENT
Start: 2018-08-03 | End: 2018-08-03 | Stop reason: HOSPADM

## 2018-08-03 RX ORDER — HEPARIN SODIUM 200 [USP'U]/100ML
500 INJECTION, SOLUTION INTRAVENOUS ONCE
Status: COMPLETED | OUTPATIENT
Start: 2018-08-03 | End: 2018-08-03

## 2018-08-03 RX ORDER — DEXMEDETOMIDINE HYDROCHLORIDE 100 UG/ML
INJECTION, SOLUTION INTRAVENOUS AS NEEDED
Status: DISCONTINUED | OUTPATIENT
Start: 2018-08-03 | End: 2018-08-03 | Stop reason: HOSPADM

## 2018-08-03 RX ORDER — ROCURONIUM BROMIDE 10 MG/ML
INJECTION, SOLUTION INTRAVENOUS AS NEEDED
Status: DISCONTINUED | OUTPATIENT
Start: 2018-08-03 | End: 2018-08-03 | Stop reason: HOSPADM

## 2018-08-03 RX ORDER — GLYCOPYRROLATE 0.2 MG/ML
INJECTION INTRAMUSCULAR; INTRAVENOUS AS NEEDED
Status: DISCONTINUED | OUTPATIENT
Start: 2018-08-03 | End: 2018-08-03 | Stop reason: HOSPADM

## 2018-08-03 RX ORDER — HEPARIN SODIUM 10000 [USP'U]/100ML
9-25 INJECTION, SOLUTION INTRAVENOUS CONTINUOUS
Status: DISCONTINUED | OUTPATIENT
Start: 2018-08-03 | End: 2018-08-03

## 2018-08-03 RX ORDER — ATORVASTATIN CALCIUM 20 MG/1
20 TABLET, FILM COATED ORAL DAILY
Status: DISCONTINUED | OUTPATIENT
Start: 2018-08-04 | End: 2018-08-04 | Stop reason: HOSPADM

## 2018-08-03 RX ORDER — PROTAMINE SULFATE 10 MG/ML
INJECTION, SOLUTION INTRAVENOUS AS NEEDED
Status: DISCONTINUED | OUTPATIENT
Start: 2018-08-03 | End: 2018-08-03 | Stop reason: HOSPADM

## 2018-08-03 RX ORDER — SODIUM CHLORIDE 0.9 % (FLUSH) 0.9 %
5-10 SYRINGE (ML) INJECTION AS NEEDED
Status: DISCONTINUED | OUTPATIENT
Start: 2018-08-03 | End: 2018-08-03

## 2018-08-03 RX ORDER — SODIUM CHLORIDE 0.9 % (FLUSH) 0.9 %
5-10 SYRINGE (ML) INJECTION EVERY 8 HOURS
Status: DISCONTINUED | OUTPATIENT
Start: 2018-08-03 | End: 2018-08-03

## 2018-08-03 RX ORDER — PROTAMINE SULFATE 10 MG/ML
1-60 INJECTION, SOLUTION INTRAVENOUS ONCE
Status: DISCONTINUED | OUTPATIENT
Start: 2018-08-03 | End: 2018-08-03

## 2018-08-03 RX ADMIN — PROTAMINE SULFATE 10 MG: 10 INJECTION, SOLUTION INTRAVENOUS at 16:49

## 2018-08-03 RX ADMIN — GLYCOPYRROLATE 0.1 MG: 0.2 INJECTION INTRAMUSCULAR; INTRAVENOUS at 17:27

## 2018-08-03 RX ADMIN — Medication 40 MCG: at 13:35

## 2018-08-03 RX ADMIN — Medication 80 MCG: at 13:40

## 2018-08-03 RX ADMIN — SODIUM CHLORIDE: 9 INJECTION, SOLUTION INTRAVENOUS at 13:00

## 2018-08-03 RX ADMIN — PROPOFOL 200 MG: 10 INJECTION, EMULSION INTRAVENOUS at 13:06

## 2018-08-03 RX ADMIN — PROTAMINE SULFATE 10 MG: 10 INJECTION, SOLUTION INTRAVENOUS at 16:47

## 2018-08-03 RX ADMIN — ONDANSETRON 4 MG: 2 INJECTION, SOLUTION INTRAMUSCULAR; INTRAVENOUS at 23:14

## 2018-08-03 RX ADMIN — SUCCINYLCHOLINE CHLORIDE 200 MG: 20 INJECTION INTRAMUSCULAR; INTRAVENOUS at 13:07

## 2018-08-03 RX ADMIN — ROCURONIUM BROMIDE 5 MG: 10 INJECTION, SOLUTION INTRAVENOUS at 15:41

## 2018-08-03 RX ADMIN — Medication 1000 UNITS: at 12:00

## 2018-08-03 RX ADMIN — ROCURONIUM BROMIDE 5 MG: 10 INJECTION, SOLUTION INTRAVENOUS at 13:06

## 2018-08-03 RX ADMIN — Medication 10 ML: at 21:28

## 2018-08-03 RX ADMIN — HEPARIN SODIUM 18 UNITS/KG/HR: 10000 INJECTION, SOLUTION INTRAVENOUS at 14:36

## 2018-08-03 RX ADMIN — PROTAMINE SULFATE 10 MG: 10 INJECTION, SOLUTION INTRAVENOUS at 16:53

## 2018-08-03 RX ADMIN — PROPOFOL 100 MG: 10 INJECTION, EMULSION INTRAVENOUS at 15:24

## 2018-08-03 RX ADMIN — HEPARIN SODIUM 5000 UNITS: 1000 INJECTION, SOLUTION INTRAVENOUS; SUBCUTANEOUS at 15:25

## 2018-08-03 RX ADMIN — Medication 10 ML: at 23:14

## 2018-08-03 RX ADMIN — DEXMEDETOMIDINE HYDROCHLORIDE 5 MCG: 100 INJECTION, SOLUTION INTRAVENOUS at 17:28

## 2018-08-03 RX ADMIN — HEPARIN SODIUM 7000 UNITS: 1000 INJECTION, SOLUTION INTRAVENOUS; SUBCUTANEOUS at 14:58

## 2018-08-03 RX ADMIN — DEXMEDETOMIDINE HYDROCHLORIDE 5 MCG: 100 INJECTION, SOLUTION INTRAVENOUS at 17:35

## 2018-08-03 RX ADMIN — ROCURONIUM BROMIDE 25 MG: 10 INJECTION, SOLUTION INTRAVENOUS at 13:13

## 2018-08-03 RX ADMIN — HEPARIN SODIUM 8000 UNITS: 1000 INJECTION, SOLUTION INTRAVENOUS; SUBCUTANEOUS at 14:25

## 2018-08-03 RX ADMIN — SODIUM CHLORIDE: 9 INJECTION, SOLUTION INTRAVENOUS at 15:26

## 2018-08-03 RX ADMIN — ROCURONIUM BROMIDE 15 MG: 10 INJECTION, SOLUTION INTRAVENOUS at 15:24

## 2018-08-03 RX ADMIN — PROTAMINE SULFATE 10 MG: 10 INJECTION, SOLUTION INTRAVENOUS at 16:51

## 2018-08-03 RX ADMIN — NEOSTIGMINE METHYLSULFATE 2.5 MG: 1 INJECTION INTRAVENOUS at 17:27

## 2018-08-03 RX ADMIN — FENTANYL CITRATE 100 MCG: 50 INJECTION, SOLUTION INTRAMUSCULAR; INTRAVENOUS at 13:03

## 2018-08-03 RX ADMIN — FENTANYL CITRATE 50 MCG: 50 INJECTION, SOLUTION INTRAMUSCULAR; INTRAVENOUS at 15:24

## 2018-08-03 RX ADMIN — FENTANYL CITRATE 100 MCG: 50 INJECTION, SOLUTION INTRAMUSCULAR; INTRAVENOUS at 13:06

## 2018-08-03 RX ADMIN — DEXMEDETOMIDINE HYDROCHLORIDE 5 MCG: 100 INJECTION, SOLUTION INTRAVENOUS at 17:24

## 2018-08-03 RX ADMIN — LIDOCAINE HYDROCHLORIDE 100 MG: 20 INJECTION, SOLUTION EPIDURAL; INFILTRATION; INTRACAUDAL; PERINEURAL at 13:06

## 2018-08-03 RX ADMIN — PROTAMINE SULFATE 10 MG: 10 INJECTION, SOLUTION INTRAVENOUS at 16:55

## 2018-08-03 NOTE — PROGRESS NOTES
TRANSFER - OUT REPORT:    Verbal report given to Maggie Nieto 5 on Carole Deal being transferred to  for routine progression of care       Report consisted of patients Situation, Background, Assessment and   Recommendations(SBAR). Information from the following report(s) SBAR, Procedure Summary and MAR was reviewed with the receiving nurse. Opportunity for questions and clarification was provided.

## 2018-08-03 NOTE — IP AVS SNAPSHOT
[address] [place] [address] 
[phone] Patient: Adriana Centeno MRN: [id number] :1980 About your hospitalization You were admitted on:  August 3, 2018 You last received care in the:  [facility] 4 CV SERVICES UNIT You were discharged on:  2018 Why you were hospitalized Your primary diagnosis was:  S/P Ablation Of Atrial Fibrillation Your diagnoses also included:  Paf (Paroxysmal Atrial Fibrillation) (Hcc) Follow-up Information Follow up With Details Comments Contact Info None   None ([phone]) Patient stated that they have no PCP Your Scheduled Appointments 2018  2:20 PM EDT  
ESTABLISHED PATIENT with Sharon Power MD  
[facility] ([address]) [address] [place] [address]  [address] [place] [address],[address] [place] [address]  
[phone] Discharge Orders None A check chico indicates which time of day the medication should be taken. My Medications CONTINUE taking these medications Instructions Each Dose to Equal  
 Morning Noon Evening Bedtime  
 atorvastatin 20 mg tablet Commonly known as:  LIPITOR Your last dose was: Your next dose is: Take 20 mg by mouth daily. 20 mg  
    
   
   
   
  
 ELIQUIS 5 mg tablet Generic drug:  apixaban Your last dose was: Your next dose is: Take 1 Tab by mouth two (2) times a day. 5 mg KlonoPIN 0.5 mg tablet Generic drug:  clonazePAM  
   
Your last dose was: Your next dose is: Take 0.5 mg by mouth daily. Taking one half tablet at night. As of 17, taking every am.  
 0.5 mg  
    
   
   
   
  
 metFORMIN  mg tablet Commonly known as:  GLUCOPHAGE XR Your last dose was: Your next dose is: Take 500 mg by mouth two (2) times a day.   
 500 mg  
    
   
   
   
 Discharge Instructions Resume eliquis 8/4/2018 Patient Instructions Post-EP study and ablation 1. No heavy lifting or exercises for 1 week. This includes the following: Do not push or move furniture, jog or run 2. Do not drive for 2 days. 3.  Call Dr. Mariana Aviles at [phone] if you experience any of the following symptoms: 
Dizziness, lightheadedness, fainting spells Lack of energy Shortness of breath Rapid heart rate Chest or muscle twitches Blurry vision, double vision, weakness, numbness Nausea, vomiting Fever Bleeding in the stool, black stool Leg swelling, pain 4. Follow-up with Dr. Mariana Aviles Future Appointments Date Time Provider Farhat Sumner 8/21/2018 2:20 PM Giorgi Johnson  [address] [facility] Announcement We are excited to announce that we are making your provider's discharge notes available to you in Amplify.LA. You will see these notes when they are completed and signed by the physician that discharged you from your recent hospital stay. If you have any questions or concerns about any information you see in Amplify.LA, please call the Health Information Department where you were seen or reach out to your Primary Care Provider for more information about your plan of care. Introducing [facility] & HEALTH SERVICES! Dear Wilmar Berry: Thank you for requesting a Amplify.LA account. Our records indicate that you already have an active Amplify.LA account. You can access your account anytime at [email or web address] Did you know that you can access your hospital and ER discharge instructions at any time in Amplify.LA? You can also review all of your test results from your hospital stay or ER visit. Additional Information If you have questions, please visit the Frequently Asked Questions section of the [email or web address] website at [email or web address] Remember, MyChart is NOT to be used for urgent needs. For medical emergencies, dial 911. Now available from your iPhone and Android! Introducing Zeferino Jaimes As a [facility] patient, I wanted to make you aware of our electronic visit tool called Zeferino Jaimes. DemandPoint 24/RedSeal Networks allows you to connect within minutes with a medical provider 24 hours a day, seven days a week via a mobile device or tablet or logging into a secure website from your computer. You can access Zeferino Jaimes from anywhere in the [place]. A virtual visit might be right for you when you have a simple condition and feel like you just dont want to get out of bed, or cant get away from work for an appointment, when your regular [facility] provider is not available (evenings, weekends or holidays), or when youre out of town and need minor care. Electronic visits cost only $49 and if the GonzalezOvercart/RedSeal [employer] provider determines a prescription is needed to treat your condition, one can be electronically transmitted to a nearby pharmacy*. Please take a moment to enroll today if you have not already done so. The enrollment process is free and takes just a few minutes. To enroll, please download the DemandPoint 24/RedSeal Networks xavier to your tablet or phone, or visit [email or web address] to enroll on your computer. And, as an [address] [place] [address] patient with a Bountysource account, the results of your visits will be scanned into your electronic medical record and your primary care provider will be able to view the scanned results. We urge you to continue to see your regular [facility] provider for your ongoing medical care. And while your primary care provider may not be the one available when you seek a Zeferino Jaimes virtual visit, the peace of mind you get from getting a real diagnosis real time can be priceless.    
 
For more information on Zeferino Jaimes, view our Frequently Asked Questions (FAQs) at [email or web address]. Sincerely, 
 
Jim Ren MD 
Chief Medical Officer 8 S[address] Wyatt *:  certain medications cannot be prescribed via Zeferino Jaimes Providers Seen During Your Hospitalization Provider Specialty Primary office phone Betsy Blizzard, MD Cardiology [phone] Your Primary Care Physician (PCP) Primary Care Physician Office Phone Office Fax NONE ** None ** ** None ** You are allergic to the following No active allergies Recent Documentation Height Weight BMI Smoking Status 1.829 m 101.1 kg 30.23 kg/m2 Former Smoker Emergency Contacts Name Discharge Info Relation Home Work Mobile Torri Craft CAREGIVER [3] Spouse [3] [phone] Patient Belongings The following personal items are in your possession at time of discharge: 
  Dental Appliances: None  Visual Aid: None      Home Medications: None   Jewelry: None  Clothing: None    Other Valuables: None Please provide this summary of care documentation to your next provider. Signatures-by signing, you are acknowledging that this After Visit Summary has been reviewed with you and you have received a copy. Patient Signature:  ____________________________________________________________ Date:  ____________________________________________________________  
  
[facility] Provider Signature:  ____________________________________________________________ Date:  ____________________________________________________________

## 2018-08-03 NOTE — IP AVS SNAPSHOT
[address] 
[phone] Patient: Marcial Corral MRN: [id number] :1980 A check chico indicates which time of day the medication should be taken. My Medications CONTINUE taking these medications Instructions Each Dose to Equal  
 Morning Noon Evening Bedtime  
 atorvastatin 20 mg tablet Commonly known as:  LIPITOR Your last dose was: Your next dose is: Take 20 mg by mouth daily. 20 mg  
    
   
   
   
  
 ELIQUIS 5 mg tablet Generic drug:  apixaban Your last dose was: Your next dose is: Take 1 Tab by mouth two (2) times a day. 5 mg KlonoPIN 0.5 mg tablet Generic drug:  clonazePAM  
   
Your last dose was: Your next dose is: Take 0.5 mg by mouth daily. Taking one half tablet at night. As of 17, taking every am.  
 0.5 mg  
    
   
   
   
  
 metFORMIN  mg tablet Commonly known as:  GLUCOPHAGE XR Your last dose was: Your next dose is: Take 500 mg by mouth two (2) times a day.   
 500 mg

## 2018-08-03 NOTE — LETTER
NOTIFICATION OF RETURN TO WORK  
 
8/3/2018 6:39 PM 
 
Mr. Ness Kaminski [facility] [address] [id number] Jo Godinez To Whom It May Concern: 
 
Ness Kaminski was under the care of [facility] from 8/3/2018 to 8/4/2018 He will be able to return to work/school on 8/8/2018 with regular duties and/or activities . If there are questions or concerns please have the patient contact our office. Sincerely, 
 
 
Deejay Lewis M.D. [facility] Electrophysiology/Cardiology [facility] [address], [facility] [address] [place] [address] 
[phone]                                        [phone]

## 2018-08-03 NOTE — DISCHARGE INSTRUCTIONS
Resume eliquis 8/4/2018       Patient Instructions Post-EP study and ablation    1. No heavy lifting or exercises for 1 week. This includes the following:  Do not push or move furniture, jog or run    2. Do not drive for 2 days. 3.  Call Dr. Nichole Pack at [phone] if you experience any of the following symptoms:  Dizziness, lightheadedness, fainting spells  Lack of energy  Shortness of breath  Rapid heart rate  Chest or muscle twitches  Blurry vision, double vision, weakness, numbness  Nausea, vomiting  Fever  Bleeding in the stool, black stool  Leg swelling, pain    4.   Follow-up with Dr. Ritchie Castro  Date Time Provider Farhat Taisha   8/21/2018 2:20 PM René Barahona  [address]

## 2018-08-03 NOTE — PROGRESS NOTES
1900: PRECEPTOR REVIEW OF RN ORIENTEE'S WORK:    8/3/2018    Shift times- 1900 to 1953    The RN orientee's documentation of patient care for Moisés Cummings has been reviewed and approved. All medications have been administered under the direct supervision of the preceptor. Starr Crane

## 2018-08-03 NOTE — ROUTINE PROCESS
TRANSFER - OUT REPORT:    Verbal report given to TERE Ng(name) on Hardy Dey  being transferred to CVSU(unit) for routine progression of care       Report consisted of patients Situation, Background, Assessment and   Recommendations(SBAR). Information from the following report(s) SBAR, Kardex, Procedure Summary, Intake/Output, MAR and Recent Results was reviewed with the receiving nurse. Lines:   Peripheral IV 08/03/18 Right Arm (Active)        Opportunity for questions and clarification was provided.       Patient transported with:   Monitor  Registered Nurse

## 2018-08-03 NOTE — ANESTHESIA PREPROCEDURE EVALUATION
Anesthetic History   No history of anesthetic complications            Review of Systems / Medical History  Patient summary reviewed, nursing notes reviewed and pertinent labs reviewed    Pulmonary  Within defined limits                 Neuro/Psych   Within defined limits           Cardiovascular            Dysrhythmias : atrial fibrillation           GI/Hepatic/Renal     GERD: well controlled      PUD     Endo/Other    Diabetes: well controlled, type 2         Other Findings            Physical Exam    Airway  Mallampati: II  TM Distance: > 6 cm  Neck ROM: normal range of motion   Mouth opening: Normal     Cardiovascular  Regular rate and rhythm,  S1 and S2 normal,  no murmur, click, rub, or gallop             Dental  No notable dental hx       Pulmonary  Breath sounds clear to auscultation               Abdominal  GI exam deferred       Other Findings            Anesthetic Plan    ASA: 3  Anesthesia type: general    Monitoring Plan: Arterial line      Induction: Intravenous  Anesthetic plan and risks discussed with: Patient

## 2018-08-03 NOTE — PROCEDURES
Cardiac Procedure Note   Patient: Nyasia Quinn  MRN: [id number]  SSN: [id number]   YOB: 1980 Age: 42 y.o.   Sex: male    Date of Procedure: 8/3/2018   Pre-procedure Diagnosis: PAF, palpitation  Post-procedure Diagnosis: same  Procedure: EP Study and Ablation  :  Dr. Amy Osborne MD    Assistant(s):  None  Anesthesia: general anesthesia  Estimated Blood Loss: Less than 10 mL   Specimens Removed: None  Findings: all 4 PV have PV potentials  All are isolated by ablations  CTI line of block from previous atrial flutter is confirmed  Complications: None   Implants:  None  Signed by:  Amy Osborne MD  8/3/2018  5:22 PM

## 2018-08-03 NOTE — IP AVS SNAPSHOT
Summary of Care Report The Summary of Care report has been created to help improve care coordination. Users with access to [facility] or [address] (Web-based application) may access additional patient information including the Discharge Summary. If you are not currently a [address] user and need more information, please call the number listed below in the [place] [address] section and ask to be connected with Medical Records. Facility Information Name Address Phone [address] [phone] Patient Information Patient Name Sex ECHO Robb ([id number]) Male 1980 Discharge Information Admitting Provider Service Area Unit Dino Morales MD / [address] [facility] Unit / [phone] Discharge Provider Discharge Date/Time Discharge Disposition Destination (none) 2018 (Pending) AHR (none) Patient Language Language ENGLISH [13] Hospital Problems as of 2018  Reviewed: 8/3/2018  5:19 PM by Dino Morales MD  
  
  
  
 Class Noted - Resolved Last Modified POA Active Problems PAF (paroxysmal atrial fibrillation) ([facility] [address]  2011 - Present 8/3/2018 by Dino Morales MD Yes Entered by Rebecca Maciel MD  
  Overview Addendum 2012  1:44 PM by Dino Morales MD  
    converted spontaneously in [facility] er. Normal echo at that time. 2012: [facility], comprehensive EP study with 3 D mapping under conscious sedation and then general anesthesia 
atrial flutter trigerring atrial fibrillation without isuprel were inducible with programmed stimulation at 1 extrastimulus, mapping showed right atrial flutter and ablation at the cavotricuspid isthmus were done.   No further atrial flutter/tachycardia, fibrillation were inducible after ablation despite isuprel to wide open amount and burst rapid pacing to 2:1 AV block (240 ms), incremental and programmed atrial stimulation up to 3 extrastimuli to 200 ms at mid, high, low RA, prox CS, mid CS. Distal CS pacing did not capture. Dual AV herberth physiology is noted with one AV herberth echo beats but there was no AVNRT so this was not ablated. Atrial fibrillation ablation was not done because this was not the primary arrhythmia * (Principal)S/P ablation of atrial fibrillation  8/3/2018 - Present 8/3/2018 by Jon Mota MD Unknown Entered by Jon Mota MD  
  
Non-Hospital Problems as of 8/4/2018  Reviewed: 8/3/2018  5:19 PM by Jon Mota MD  
  
  
  
 Class Noted - Resolved Last Modified Active Problems SVT (supraventricular tachycardia) ([facility] [address]  7/19/2011 - Present 7/19/2011 by Charles Harrison MD  
  Entered by Charles Harrison MD  
  Overview Signed 7/19/2011  4:44 PM by Charles Harrison MD  
   approx 2006, with neg ep study and stress test at [facility]. Subsequently treated with inderal. 
  
  
  Family history of coronary artery disease  7/19/2011 - Present 7/19/2011 by Charles Harrison MD  
  Entered by Charles Harrison MD  
  Palpitations  6/10/2016 - Present 6/24/2017 by Jon Mota MD  
  Entered by Charles Harrison MD  
  Overview Signed 6/10/2016  8:22 AM by Charles Harrison MD  
   4/16 event monitor, nsr, sinus tach, pacs, pvcs Symptomatic PVCs  6/24/2017 - Present 6/24/2017 by Jon Mota MD  
  Entered by Jon Mota MD  
  
You are allergic to the following No active allergies Current Discharge Medication List  
  
CONTINUE these medications which have NOT CHANGED Dose & Instructions Dispensing Information Comments  
 atorvastatin 20 mg tablet Commonly known as:  LIPITOR Dose:  20 mg Take 20 mg by mouth daily. Refills:  0  
   
 ELIQUIS 5 mg tablet Generic drug:  apixaban Dose:  5 mg Take 1 Tab by mouth two (2) times a day. Quantity:  60 Tab Refills:  5 KlonoPIN 0.5 mg tablet Generic drug:  clonazePAM  
 Dose:  0.5 mg Take 0.5 mg by mouth daily. Taking one half tablet at night. As of 17, taking every am.  
 Refills:  0  
   
 metFORMIN  mg tablet Commonly known as:  GLUCOPHAGE XR Dose:  500 mg Take 500 mg by mouth two (2) times a day. Refills:  0 Surgery Information ID Date/Time Status Primary Surgeon All Procedures Location [id number] 8/3/2018 [facility] - DO NOT SCHEDULE Follow-up Information Follow up With Details Comments Contact Info None   None (395) Patient stated that they have no PCP Discharge Instructions Resume eliquis 2018 Patient Instructions Post-EP study and ablation 1. No heavy lifting or exercises for 1 week. This includes the following: Do not push or move furniture, jog or run 2. Do not drive for 2 days. 3.  Call Dr. Christi Layne at [phone] if you experience any of the following symptoms: 
Dizziness, lightheadedness, fainting spells Lack of energy Shortness of breath Rapid heart rate Chest or muscle twitches Blurry vision, double vision, weakness, numbness Nausea, vomiting Fever Bleeding in the stool, black stool Leg swelling, pain 4. Follow-up with Dr. Christi Layne Future Appointments Date Time Provider Farhat Sumner 2018 2:20 PM Natasha Maldonado  [address] Chart Review Routing History Recipient Method Report Sent By Filippo Corea MD  
Fax: [phone] Phone: [phone] Fax Provider Comm Report Comfort Spring MD [[id number]] 10/11/2011  5:30 PM 10/11/2011 Reji Corea MD  
Fax: [phone] Phone: [phone] Fax Provider Comm Report Comfort Spring MD [[id number]] 3/21/2012  5:34 PM 2012 Reji Corea MD  
Fax: [phone] Phone: [phone] Fax Provider Comm Report Eduarda Pa MD [[id number]] 3/27/2012 10:11 AM 03/27/2012 Michaelle Bose MD  
Phone: [phone] In Basket Note Review Eduarda aP MD [[id number]] 3/27/2012 10:11 AM 03/27/2012 Giorgio Polanco MD  
Fax: [phone] Phone: [phone] Fax Provider Comm Report Eduarda Pa MD [[id number]] 4/26/2012  4:38 PM 04/26/2012 Michaelle Bose MD  
Phone: [phone] In Basket Notes/Transcriptions Eduarda Pa MD [[id number]] 4/26/2012  4:38 PM 04/26/2012 Braulio Wharton RN Phone: [phone] In Basket Notes/Transcriptions Eduarda Pa MD [[id number]] 4/26/2012 11:05 PM 04/26/2012 Eduarda Pa MD  
Phone: [phone] In Basket Note Review Ashly Boothe MD [[id number]] 7/25/2012  9:48 AM 07/25/2012 Giorgio Polanco MD  
Fax: [phone] Phone: [phone] Fax Provider Comm Report Ashly Boothe MD [[id number]] 7/25/2012  9:48 AM 07/25/2012 Giorgio Polanco MD  
Fax: [phone] Phone: [phone] Fax Note Review Ashly Boothe MD [[id number]] 7/22/2013  2:56 PM 07/22/2013 Giorgio Polanco MD  
Fax: [phone] Phone: [phone] Fax Note Review Ashly Boothe MD [[id number]] 6/23/2014 11:39 AM 06/23/2014 Giorgio Polanco MD  
Fax: [phone] Phone: [phone] Fax [facility] amb office visit enc summ w/hx Eduarda Pa MD [[id number]] 7/26/2014 12:17 PM 07/18/2014 Giorgio Polanco MD  
Fax: [phone] Phone: [phone] Fax [facility] office visit enc summ w/hx Eduarda Pa MD [[id number]] 8/29/2014 11:32 AM 08/29/2014 Ashley Preston MD  
Fax: [phone] Phone: [phone] Fax Note Review Ashly Boothe MD [[id number]] 4/27/2016  8:17 AM 04/27/2016 Eduarda Pa MD  
Phone: [phone] In Basket Note Review Ashly Boothe MD [[id number]] 4/27/2016  8:17 AM 04/27/2016 Ashley Preston MD  
Fax: [phone] Phone: [phone] Fax Note Review Ashly Boothe MD [[id number]] 6/23/2016  8:54 AM 06/23/2016 Yamilet Varela MD  
Fax: [phone] Phone: [phone] Fax Notes Report Ron Orourke MD [[id number]] 3/29/2017 10:08 AM 3/29/2017 Patrice Ken MD  
Phone: [phone] In Basket Notes Report Ron Orourke MD [[id number]] 3/29/2017 10:08 AM 3/29/2017 Yamilet Varela MD  
Fax: [phone] Phone: [phone] Fax Notes Report Felicia Mckeon MD [[id number]] 4/15/2017 10:27 AM 4/15/2017 Ron Orourke MD  
Phone: [phone] In Basket Notes Report Felicia Mckeon MD [[id number]] 4/15/2017 10:27 AM 4/15/2017 Yamilet Varela MD  
Fax: [phone] Phone: [phone] Fax Notes Report Felicia Mckeon MD [[id number]] 7/13/2017  3:04 PM 7/13/2017 Yamilet Varela MD  
Fax: [phone] Phone: [phone] Fax Notes Report Ron Orourke MD [[id number]] 5/31/2018  5:25 PM 5/31/2018 Felicia Mckeon MD  
Phone: [phone] In Basket Notes Report Ron Orourke MD [[id number]] 5/31/2018  5:25 PM 5/31/2018

## 2018-08-03 NOTE — PROGRESS NOTES
Cardiac Cath Lab Recovery Arrival Note:      Nyasia Quinn arrived to Cardiac Cath Lab, Recovery Area. Staff introduced to patient. Patient identifiers verified with NAME and DATE OF BIRTH. Procedure verified with patient. Consent forms reviewed and signed by patient or authorized representative and verified. Allergies verified. Patient informed of procedure and plan of care. Questions answered with review. Patient prepped for procedure, per orders from physician, prior to arrival.    Patient on cardiac monitor, non-invasive blood pressure, SPO2 monitor. Patient is A&Ox 4. Patient reports no complaints. Patient in stretcher, in low position, with side rails up, call bell within reach, patient instructed to call of assistance as needed. Patient prep in: [facility] Recovery Area, Bed# 10. Family in: waiting room. Prep by: SELWYN Jha

## 2018-08-03 NOTE — PROGRESS NOTES
Cardiac Cath Lab Procedure Area Arrival Note:    Donya Richter arrived to Cardiac Cath Lab, Procedure Area. Patient identifiers verified with NAME and DATE OF BIRTH. Procedure verified with patient. Consent forms verified. Allergies verified. Patient informed of procedure and plan of care. Questions answered with review. Patient voiced understanding of procedure and plan of care. Patient on cardiac monitor, non-invasive blood pressure, SPO2 monitor. On Room air or O2 @ 2 lpm via NC.  IV of 0.9ns on pump at 25 ml/hr. Patient status doing well without problems. Patient is A&Ox 3. Patient reports no s/s of discomfort. Patient medicated during procedure with orders obtained and verified by Dr. Christi Layne. Refer to patients Cardiac Cath Lab PROCEDURE REPORT for vital signs, assessment, status, and response during procedure, printed at end of case. Printed report on chart or scanned into chart.

## 2018-08-03 NOTE — PROGRESS NOTES
1841: Verbal report received by Namrata (offgoing nurse). Report included the following information SBAR, Procedure Summary, Intake/Output, MAR, Recent Results and Cardiac Rhythm SR.   1900: TRANSFER - IN REPORT:    Verbal report received from [facility] 5 on Tavo Treviño  being received from EP lab(unit) for routine progression of care      Report consisted of patients Situation, Background, Assessment and   Recommendations(SBAR). Information from the following report(s) SBAR, Procedure Summary, Intake/Output, MAR, Recent Results and Cardiac Rhythm nsr was reviewed with the receiving nurse. Opportunity for questions and clarification was provided. Assessment completed upon patients arrival to unit and care assumed. 1908: Pt arrived to unit. VS obtained and pt placed on tele. Confirmed with monitor tech. Pt's groin sites are soft, no S/S of bleeding or hematoma, 2/2 pulses     1930: Bedside shift change report given to Juwan (oncoming nurse). Report included the following information SBAR, Procedure Summary, Recent Results and Cardiac Rhythm NSR. Bilateral groin sites remain soft, no S/S of bleeding or hematoma, 2/2 pulses.

## 2018-08-03 NOTE — DISCHARGE SUMMARY
Cardiology Discharge Summary               Patient ID:  Sven Wilkes  [id number]  85 y.o.  1980    Admit Date: 8/3/2018    Discharge Date: 8/4/2018     Admitting Physician: Olga Avila MD     Discharge Physician: Yahaira Smart MD    Admission Diagnoses: cc;S/P ablation of atrial fibrillation;PAF (paroxysmal atri*    Discharge Diagnoses: Principal Problem:    S/P ablation of atrial fibrillation (8/3/2018)    Active Problems:    PAF (paroxysmal atrial fibrillation)        Discharge Condition: Stable    Cardiology Procedures this Admission:  Ep study and ablation of atrial fibrillation    Hospital Course: the patient has PAF and symptoms   He had tried cardizem, beta blocker and Multaq and cannot tolerate  He had typical atrial flutter ablation 6 years ago  He had successful atrial fibrillation ablation  No complication known immediately  He will need to continue with eliquis indefinitely because it was not clear if he had TIA recently in [facility]    Stable this AM without any recurrent arrhythmia on telemetry. Eliquis restarted    Consults: None    Discharge Exam:     Visit Vitals    /61 (BP 1 Location: Left arm, BP Patient Position: At rest)    Pulse 82    Temp 98.6 °F (37 °C)    Resp 18    Ht 6' (1.829 m)    Wt 222 lb 14.2 oz (101.1 kg)    SpO2 95%    BMI 30.23 kg/m2     General Appearance:  Well developed, well nourished,alert and oriented x 3, and individual in no acute distress. Ears/Nose/Mouth/Throat:   Hearing grossly normal.         Neck: Supple. Chest:   Lungs clear to auscultation bilaterally. Cardiovascular:  Regular rhythm,  no murmur. Abdomen:   Soft, non-tender    Extremities: No edema bilaterally. Skin: Warm and dry. Disposition: home    Patient Instructions:   Current Discharge Medication List      CONTINUE these medications which have NOT CHANGED    Details   clonazePAM (KLONOPIN) 0.5 mg tablet Take 0.5 mg by mouth daily.  Taking one half tablet at night. As of 4-14-17, taking every am.      atorvastatin (LIPITOR) 20 mg tablet Take 20 mg by mouth daily. ELIQUIS 5 mg tablet Take 1 Tab by mouth two (2) times a day. Qty: 60 Tab, Refills: 5    Associated Diagnoses: Symptomatic PVCs; Paroxysmal atrial fibrillation ([address] SVT (supraventricular tachycardia) ([address] Palpitations; Family history of coronary artery disease      metFORMIN ER (GLUCOPHAGE XR) 500 mg tablet Take 500 mg by mouth two (2) times a day. Referenced discharge instructions provided by nursing for diet and activity.     Follow-up with     Future Appointments  Date Time Provider Farhat Sumner   8/21/2018 2:20 PM Lori Mcintosh  [address]       Signed:  Elmer Avalos MD  8/4/2018  6:21 PM

## 2018-08-03 NOTE — H&P
Cardiac Electrophysiology H/P Note   REFERRING PROVIDER: Dr Mirela Pagan  Subjective:      Yessenia Conde is a 42 y.o. patient who is seen for EP study and ablation  Loop monitor has shown recurrent PSVT or atrial flutter RVR  EP study 6/23/2017 showed dual AV node but no AVNRT so that was not ablated. He may have atrial flutter or atrial tach or AVNRT  There is concern for AFIB at [facility] [place] with TIA/transient vision loss  He had been to ER several times with chest pain, palpitation and shortness of breaths  He had previous PVC concern     Patient Active Problem List   Diagnosis Code    Paroxysmal atrial fibrillation (HCC) I48.0    SVT (supraventricular tachycardia) (HCC) I47.1    Family history of coronary artery disease Z82.49    Palpitations R00.2    Symptomatic PVCs I49.3     No current facility-administered medications on file prior to encounter. Current Outpatient Prescriptions on File Prior to Encounter   Medication Sig Dispense Refill    clonazePAM (KLONOPIN) 0.5 mg tablet Take 0.5 mg by mouth daily. Taking one half tablet at night. As of 4-14-17, taking every am.      atorvastatin (LIPITOR) 20 mg tablet Take 20 mg by mouth daily.  ELIQUIS 5 mg tablet Take 1 Tab by mouth two (2) times a day. 60 Tab 5    metFORMIN ER (GLUCOPHAGE XR) 500 mg tablet Take 500 mg by mouth two (2) times a day.          Current Facility-Administered Medications   Medication Dose Route Frequency Provider Last Rate Last Dose    sodium chloride (NS) flush 5-10 mL  5-10 mL IntraVENous Q8H Real Seat, MD        sodium chloride (NS) flush 5-10 mL  5-10 mL IntraVENous PRN Real Seat, MD        heparin (porcine) 1,000 unit/mL injection 1,000 Units  1,000 Units IntraVENous ONCE Real Seat, MD        protamine injection 1-60 mg  1-60 mg IntraVENous ONCE Real Seat, MD        adenosine (ADENOCARD) injection 6-24 mg  6-24 mg IntraVENous Multiple Real Seat, MD        atropine injection 1 mg  1 mg IntraVENous Roopa Rodriguez MD        heparinized saline 2 units/mL infusion 1,000 Units  500 mL Irrigation ONCE Marci Ko MD        heparinized saline 2 units/mL infusion 1,000 Units  500 mL Irrigation ONCE Marci Ko MD        heparin 25,000 units in D5W 250 ml infusion  12-25 Units/kg/hr IntraVENous CONTINUOUS Marci Ko MD         No Known Allergies  Past Medical History:   Diagnosis Date    Atrial fibrillation [facility]) 2011    ablation 2012    [facility]     Diabetes [facility]     GERD (gastroesophageal reflux disease)     Hernia of unspecified site of abdominal cavity without mention of obstruction or gangrene     repaired    Lipoma     PUD (peptic ulcer disease)     Status post Nissen fundoplication (without gastrostomy tube) procedure      Past Surgical History:   Procedure Laterality Date    ABLATION OF SVT  4/25/2012         EP STUDY W/INDUCTION  2005    at [facility] W/INDUCTION  4/25/2012         HX GI  2005, 2011    Nissen    HX HEENT Bilateral     LASIK    HX HERNIA REPAIR Bilateral 2016    inguinal    ISOPROTERENOL NON-COMP CON  4/25/2012         ID INTRACARDIAC ELECTROPHYSIOLOGIC 3D MAPPING  4/25/2012         ID UPPER GI ENDOSCOPY,REMOV F.B.  4/20/2018          Family History   Problem Relation Age of Onset    Heart Disease Father     Diabetes Father     Cancer Father      skin    Thyroid Disease Mother      Social History   Substance Use Topics    Smoking status: Former Smoker     Packs/day: 1.00     Years: 20.00     Quit date: 04/2017    Smokeless tobacco: Current User    Alcohol use No        Review of Systems:   Constitutional: Negative for fever, chills, weight loss, malaise/fatigue. HEENT: Negative for nosebleeds, vision changes. Respiratory: Negative for cough, hemoptysis  Cardiovascular: Negative for chest pain, + palpitations, no orthopnea, claudication, leg swelling, syncope, and PND.    Gastrointestinal: Negative for nausea, vomiting, diarrhea, blood in stool and melena. Genitourinary: Negative for dysuria, and hematuria. Musculoskeletal: Negative for myalgias, arthralgia. Skin: Negative for rash. Heme: Does not bleed or bruise easily. Neurological: Negative for speech change and focal weakness     Objective:     Visit Vitals    /80 (BP 1 Location: Left arm, BP Patient Position: At rest)    Pulse 71    Temp 98.5 °F (36.9 °C)    Resp 16    Ht 6' (1.829 m)    Wt 221 lb (100.2 kg)    SpO2 96%    BMI 29.97 kg/m2      Physical Exam:   Constitutional: well-developed and well-nourished. No respiratory distress. Head: Normocephalic and atraumatic. Eyes: Pupils are equal, round  ENT: hearing normal  Neck: supple. No JVD present. Cardiovascular: Normal rate, regular rhythm. Exam reveals no gallop and no friction rub. No murmur heard. Pulmonary/Chest: Effort normal and breath sounds normal. No wheezes. Abdominal: Soft, no tenderness. Musculoskeletal: no edema. Neurological: alert,oriented. Skin: Skin is warm and dry  Psychiatric: normal mood and affect. Behavior is normal. Judgment and thought content normal.         Assessment/Plan:   Palpitation  Previously he had typical atrial flutter ablation  Dual av node physiology  PAF at EP study  PVC  TIA    He is  in [facility]   He does not tolerate medications, cardizem and beta blocker  He wants EP study and ablation of PSVT, atrial flutter, atrial fibrillation  I have explained to him risks and benefits  Risks include but are not limited to bleeding in the groin, infection in the groin and/or heart valves, fistula between the groin arteries and veins, valvular damage, diaphragmatic paralysis, aortic dissection, heart attack, stroke, blood clot in the leg, pulmonary embolism, lung collapse (pneumothorax or hemothorax), heart collapse (pericardial tamponade), death.  The added risks for left atrial ablation may be atrial-esophageal fistula, pulmonary vein stenoses, kidney failure (from contrast injection), higher risk of bleeding, stroke and heart attack. Elective or emergency surgery may be required to repair some of these complications. Prolonged hospitalization would be required. General anesthesia and transeptal catheterization may be required for the procedure     Thank you for involving me in this patient's care and please call with further concerns or questions. Linda Cowart M.D.   Electrophysiology/Cardiology  [facility] [place]  [address], [address] [place] [address] Janelle [address] [place] [address]  [phone]

## 2018-08-04 VITALS
RESPIRATION RATE: 18 BRPM | HEIGHT: 72 IN | BODY MASS INDEX: 30.19 KG/M2 | OXYGEN SATURATION: 95 % | SYSTOLIC BLOOD PRESSURE: 134 MMHG | WEIGHT: 222.88 LBS | TEMPERATURE: 98.6 F | HEART RATE: 82 BPM | DIASTOLIC BLOOD PRESSURE: 61 MMHG

## 2018-08-04 PROCEDURE — 99218 HC RM OBSERVATION: CPT

## 2018-08-04 PROCEDURE — 74011250637 HC RX REV CODE- 250/637: Performed by: INTERNAL MEDICINE

## 2018-08-04 PROCEDURE — 74011250637 HC RX REV CODE- 250/637: Performed by: NURSE PRACTITIONER

## 2018-08-04 RX ADMIN — CLONAZEPAM 0.5 MG: 0.5 TABLET ORAL at 08:40

## 2018-08-04 RX ADMIN — ATORVASTATIN CALCIUM 20 MG: 20 TABLET, FILM COATED ORAL at 08:40

## 2018-08-04 RX ADMIN — Medication 10 ML: at 06:42

## 2018-08-04 RX ADMIN — APIXABAN 5 MG: 5 TABLET, FILM COATED ORAL at 09:54

## 2018-08-04 NOTE — PROGRESS NOTES
1930: Bedside and Verbal shift change report given to [employer], RN (oncoming nurse) by Brijesh Pedroza (offgoing nurse). Report included the following information SBAR, Kardex, Intake/Output, MAR and Recent Results. 0700: Patient had uneventful night. 0730: Bedside and Verbal shift change report given to [place] RN (oncoming nurse) by [employer], RN (offgoing nurse). Report included the following information SBAR, Kardex, Intake/Output, MAR and Recent Results.

## 2018-08-04 NOTE — PROCEDURES
ELECTROPHYSIOLOGY (EP) REPORT    DATE OF PROCEDURE: 8/3/2018  PROCEDURES  1. Comprehensive Electrophysiology study including CS/LA pacing   2. Ablations of paroxysmal atrial fibrillation by pulmonary vein isolations. 3. Intracardiac echocardiogram was utilized and two transseptal catheterizations were performed. 4. Fluoroscopy was also used along with 3-dimensional electroanatomical DANO Precision mapping. BRIEF HISTORY: This is a 42 y.o.man who had paroxysmal atrial fibrillation with palpitation and shortness of breaths and he has been to a few ER in town but cannot tolerate rate control and antiarrhythmic drug. The patient does not have adequate symptom control with medical therapy and therefore he is here for the atrial fibrillation ablation. The risks and benefits the procedure have been reviewed with him on several occasions and also with his wife, and they agreed to proceed. He has held eliquis 2 days before the procedure    PROCEDURE IN DETAIL. After the informed written consent had been obtained, the patient was brought to the electrophysiology suite, where he was prepped and draped in usual sterile fashion. He had undergone general anesthesia. Access was obtained into the left and right femoral veins on a total of 4 occasions. Over the guidewires, a 10 and 6-Polish introducers were inserted into the left femoral vein, and one 5 F and one 8 F sheaths were inserted into the right femoral vein. The decapolar Bard diagnostic catheter was then advanced under fluoroscopy and placed in the coronary sinus for pacing and recording. The ablation catheter was positioned at the HIS bundle for recording and at RV/LV apex and HRA for pacing and recording. The live wire decapolar catheter was positioned along the right atrial wall/martin terminalis area for pacing and recording. Ep study was done and there was no AVNRT, accessory pathway.   There was non sustained atypical atrial flutter  The intracardiac echocardiogram used was advanced under fluoroscopy and 3 D map into the mid-right atrium. The echocardiogram catheter is St Navin. The cardiac structure was examined, and the patient does not have left atrial appendage thrombus. The interatrial septum was brought in view, and at this time, she was given intravenous heparin bolus and the [facility] RF Transseptal Catheterization System was then advanced over one of the long guidewire and then Agilis sheath into the SVC. The system was then pulled back under fluoroscopy and also with the intracardiac echocardiogram. Once the tenting of fossa ovalis was observed, the needle was then advanced into the left atrium. The saline injection was delivered via the needle and showed entry into the left atrium, and the needle was pulled back while the dilator and the long sheath was then advanced inside the left atrium. The dilator was removed along with the needle at the end. The sheath was then flushed with the pressurized heparinized saline fluid. This was exchanged for SL 1 sheath and then the same step was repeated for second transeptal puncture. Heparin drip was given  Then, the 3-dimensional electroanatomical map of the left atrium was then obtained using the Spiral multielectrode circumferential St. Navin catheter. The pulmonary veins were obtained and matched up with the cardiac CT images that had been obtained in the past. The patient has 4 discrete pulmonary veins: Two on the right and two on the left. The mapping ablation catheter used is a D/F curved St Navin Flex Ability saline irrigated tip ablation catheter, 3.5-mm tip. The pulmonary vein was accessed using the Spiral multielectrode catheter and there were pulmonary vein potentials in all PVs. The wide circumferential ablations around the pulmonary veins at 35W. The esophagus was closer to the right pulmonary veins so ablations on this side posterior left atrium were done at 25-30 W.   The local voltage was abated, and the catheter was placed individually each vein to document the isolation (entry and then exit blocks). It should also be mentioned that during the entire procedure, his heparin was given by extra bolus and also drip rate. ACT was checked every 20 minutes to keep the ACT close to 250-400 seconds. Protamine was given to reverse heparin and sheaths were pulled    COMPLICATIONS: None. Specimen removed: NONE    Estimated blood loss is 10 ML    Protamine 50 mg total was used to reverse the heparin effect after completion of the procedure. The sheaths were removed, and manual compression was applied to both inguinal areas and achieved good hemostasis. The patient was then wakened up from the general anesthesia without complication. He moved all the extremities equally and there was no neurological deficit. The patient opened his eyes and followed commands. Intracardiac echo did not show pericardial effusion    Hemodynamically, the patient was stable and in sinus rhythm. The baseline measurement    ms, QRS duration 101 ms  AH interval is 82 ms, HV is 47 ms   VA conduction was blocked at 480 ms pacing  AV herberth wenckebach 370 ms  AV node  ms @ 600 ms pacing  Bidirectional line of block were present at CTI of typical atrial flutter from previous ablation    ASSESSMENT AND PLAN: The patient will be monitored in the telemetry unit overnight. His clinical status will be assessed. He will be continued with Eliquis for at least 3 months during the blanking period. Further monitoring of arrhythmia return will be continued in the office.

## 2018-08-04 NOTE — PROGRESS NOTES
0730 Bedside and Verbal shift change report given to TERE Garrison (oncoming nurse) by Graciela Reich (offgoing nurse). Report included the following information SBAR, Kardex, Procedure Summary, Intake/Output, MAR, Recent Results and Cardiac Rhythm NSR.   1000   DISCHARGE SUMMARY from Nurse    PATIENT INSTRUCTIONS:    After general anesthesia or intravenous sedation, for 24 hours or while taking prescription Narcotics:  · Limit your activities  · Do not drive and operate hazardous machinery  · Do not make important personal or business decisions  · Do  not drink alcoholic beverages  · If you have not urinated within 8 hours after discharge, please contact your surgeon on call. Report the following to your surgeon:  · Excessive pain, swelling, redness or odor of or around the surgical area  · Temperature over 100.5  · Nausea and vomiting lasting longer than 4 hours or if unable to take medications  · Any signs of decreased circulation or nerve impairment to extremity: change in color, persistent  numbness, tingling, coldness or increase pain  · Any questions    What to do at Home:  Recommended activity: Activity as tolerated,     If you experience any of the following symptoms see instructions, please follow up with MD.    *  Please give a list of your current medications to your Primary Care Provider. *  Please update this list whenever your medications are discontinued, doses are      changed, or new medications (including over-the-counter products) are added. *  Please carry medication information at all times in case of emergency situations. These are general instructions for a healthy lifestyle:    No smoking/ No tobacco products/ Avoid exposure to second hand smoke  Surgeon General's Warning:  Quitting smoking now greatly reduces serious risk to your health.     Obesity, smoking, and sedentary lifestyle greatly increases your risk for illness    A healthy diet, regular physical exercise & weight monitoring are important for maintaining a healthy lifestyle    You may be retaining fluid if you have a history of heart failure or if you experience any of the following symptoms:  Weight gain of 3 pounds or more overnight or 5 pounds in a week, increased swelling in our hands or feet or shortness of breath while lying flat in bed. Please call your doctor as soon as you notice any of these symptoms; do not wait until your next office visit. Recognize signs and symptoms of STROKE:    F-face looks uneven    A-arms unable to move or move unevenly    S-speech slurred or non-existent    T-time-call 911 as soon as signs and symptoms begin-DO NOT go       Back to bed or wait to see if you get better-TIME IS BRAIN. Warning Signs of HEART ATTACK     Call 911 if you have these symptoms:   Chest discomfort. Most heart attacks involve discomfort in the center of the chest that lasts more than a few minutes, or that goes away and comes back. It can feel like uncomfortable pressure, squeezing, fullness, or pain.  Discomfort in other areas of the upper body. Symptoms can include pain or discomfort in one or both arms, the back, neck, jaw, or stomach.  Shortness of breath with or without chest discomfort.  Other signs may include breaking out in a cold sweat, nausea, or lightheadedness. Don't wait more than five minutes to call 911 - MINUTES MATTER! Fast action can save your life. Calling 911 is almost always the fastest way to get lifesaving treatment. Emergency Medical Services staff can begin treatment when they arrive -- up to an hour sooner than if someone gets to the hospital by car. The discharge information has been reviewed with the patient. The patient verbalized understanding.   Discharge medications reviewed with the patient and appropriate educational materials and side effects teaching were provided.   ___________________________________________________________________________________________________________________________________

## 2018-08-06 NOTE — ANESTHESIA PROCEDURE NOTES
Arterial Line Placement    Start time: 8/3/2018 1:11 PM  End time: 8/3/2018 1:14 PM  Performed by: Allyson Fairbanks  Authorized by: Allyson Fairbanks     Pre-Procedure  Indications:  Arterial pressure monitoring  Preanesthetic Checklist: patient identified, risks and benefits discussed, anesthesia consent, patient being monitored, timeout performed and patient being monitored    Timeout Time: 13:11        Procedure:   Seldinger Technique?: Yes    Orientation:  Right  Location:  Radial artery  Catheter size:  20 G  Number of attempts:  2  Cont Cardiac Output Sensor: No      Assessment:   Post-procedure:  Line secured and sterile dressing applied  Patient Tolerance:  Patient tolerated the procedure well with no immediate complications

## 2018-08-06 NOTE — ANESTHESIA POSTPROCEDURE EVALUATION
Post-Anesthesia Evaluation and Assessment    Patient: Kiran Posada MRN: [id number]  SSN: [id number]    YOB: 1980  Age: 42 y.o. Sex: male       Cardiovascular Function/Vital Signs  Visit Vitals    /61 (BP 1 Location: Left arm, BP Patient Position: At rest)    Pulse 82    Temp 37 °C (98.6 °F)    Resp 18    Ht 6' (1.829 m)    Wt 101.1 kg (222 lb 14.2 oz)    SpO2 95%    BMI 30.23 kg/m2       Patient is status post general anesthesia for * No procedures listed *. Nausea/Vomiting: None    Postoperative hydration reviewed and adequate. Pain:  Pain Scale 1: Numeric (0 - 10) (08/04/18 0739)  Pain Intensity 1: 0 (08/04/18 0739)   Managed    Neurological Status: At baseline    Mental Status and Level of Consciousness: Arousable    Pulmonary Status:   O2 Device: Room air (08/04/18 0739)   Adequate oxygenation and airway patent    Complications related to anesthesia: None    Post-anesthesia assessment completed.  No concerns    Signed By: Leonel Prajapati MD     August 6, 2018

## 2018-08-07 ENCOUNTER — TELEPHONE (OUTPATIENT)
Dept: CARDIOLOGY CLINIC | Age: 38
End: 2018-08-07

## 2018-08-07 NOTE — TELEPHONE ENCOUNTER
Verified patient with two types of identifiers. Notified patient per Dr. Mague Caruso that it is not uncommon to go in and out of A fib for the first 90 days post ablation. MD states that patient can take his Multaq again if he feels the episodes are bad enough. Patient states that his groin is bruised but does not feel a raised area or knot, he just has pressure when he coughs. Notified patient to continue to monitor but if spot worsens or a knot forms to please call our office for further assessment. Patient is a  and does not feel he is ready to go back to work tomorrow. Per MD patient may stay out of work for rest of the week. [facility] Patient verbalized understanding and will call with any other questions.

## 2018-08-07 NOTE — TELEPHONE ENCOUNTER
Verified patient with two types of identifiers. Spoke with patients wife who states that patient has been going in and out of a fib. Patient s/p ablation 8/3/18. Patient also complains of pressure at incision site. Patient's wife states that the site is bruised but she did not know if it was raised.  Will speak with MD and call patient back by the end of today

## 2018-08-21 ENCOUNTER — OFFICE VISIT (OUTPATIENT)
Dept: CARDIOLOGY CLINIC | Age: 38
End: 2018-08-21

## 2018-08-21 VITALS
OXYGEN SATURATION: 97 % | BODY MASS INDEX: 29.86 KG/M2 | HEIGHT: 73 IN | WEIGHT: 225.3 LBS | SYSTOLIC BLOOD PRESSURE: 118 MMHG | DIASTOLIC BLOOD PRESSURE: 84 MMHG | HEART RATE: 115 BPM

## 2018-08-21 DIAGNOSIS — Z86.79 S/P ABLATION OF ATRIAL FIBRILLATION: ICD-10-CM

## 2018-08-21 DIAGNOSIS — I49.3 SYMPTOMATIC PVCS: ICD-10-CM

## 2018-08-21 DIAGNOSIS — I48.0 PAF (PAROXYSMAL ATRIAL FIBRILLATION) (HCC): Primary | ICD-10-CM

## 2018-08-21 DIAGNOSIS — Z82.49 FAMILY HISTORY OF CORONARY ARTERY DISEASE: ICD-10-CM

## 2018-08-21 DIAGNOSIS — Z98.890 S/P ABLATION OF ATRIAL FLUTTER: ICD-10-CM

## 2018-08-21 DIAGNOSIS — R00.0 SINUS TACHYCARDIA BY ELECTROCARDIOGRAM: ICD-10-CM

## 2018-08-21 DIAGNOSIS — Z86.79 S/P ABLATION OF ATRIAL FLUTTER: ICD-10-CM

## 2018-08-21 DIAGNOSIS — Z98.890 S/P ABLATION OF ATRIAL FIBRILLATION: ICD-10-CM

## 2018-08-21 NOTE — MR AVS SNAPSHOT
[address] [facility] [address] [place] 
[phone] Patient: Cherri Salomon MRN: [id number] :1980 Visit Information Date & Time Provider Department Dept. Phone Encounter #  
 2018  2:20 PM Felicia Mckeon MD [facility] [place] [phone] [id number] Your Appointments 2018  2:20 PM  
ESTABLISHED PATIENT with Felicia Mckeon MD  
[facility] ([facility]) Appt Note: 2 week f/u from a-fib ablation/EKG needed [address] [place]  
[address] [phone] Upcoming Health Maintenance Date Due DTaP/Tdap/Td series (1 - Tdap) 1999 Influenza Age 5 to Adult 2018 Allergies as of 2018  Review Complete On: 2018 By: Felicia Mckeon MD  
 No Known Allergies Current Immunizations  Reviewed on 2012 No immunizations on file. Not reviewed this visit You Were Diagnosed With   
  
 Codes Comments PAF ([facility]oxysmal atrial fibrillation) ([facility]    -  Primary ICD-10-CM: I48.0 ICD-9-CM: 427.31 S/P ablation of atrial fibrillation     ICD-10-CM: Z98.890, Z86.79 
ICD-9-CM: V45.89 Vitals BP Pulse Height(growth percentile) Weight(growth percentile) SpO2 BMI  
 118/84 (BP 1 Location: Right arm, BP Patient Position: Sitting) (!) 115 6' 1\" (1.854 m) 225 lb 4.8 oz (102.2 kg) 97% 29.72 kg/m2 Smoking Status Former Smoker Vitals History BMI and BSA Data Body Mass Index Body Surface Area  
 29.72 kg/m 2 2.29 m 2 Preferred Pharmacy Pharmacy Name Phone [address]  [phone] Your Updated Medication List  
  
   
This list is accurate as of 18  2:14 PM.  Always use your most recent med list.  
  
  
  
  
 atorvastatin 20 mg tablet Commonly known as:  LIPITOR Take 20 mg by mouth daily. ELIQUIS 5 mg tablet Generic drug:  apixaban Take 1 Tab by mouth two (2) times a day. KlonoPIN 0.5 mg tablet Generic drug:  clonazePAM  
Take 0.5 mg by mouth daily. Taking one half tablet at night. As of 4-14-17, taking every am.  
  
 metFORMIN  mg tablet Commonly known as:  GLUCOPHAGE XR Take 500 mg by mouth two (2) times a day. We Performed the Following AMB POC EKG ROUTINE W/ 12 LEADS, INTER & REP [40649 CPT(R)] To-Do List   
 08/21/2018 ECG:  CARDIAC HOLTER MONITOR, 24 HOURS Patient Instructions You will be scheduled to wear a 24 hour Holter monitor 1 week prior to your 3 month follow up. Introducing \[facility] [place] [facility]\"" & HEALTH SERVICES! Dear Connie Powell: Thank you for requesting a [employer] account. Our records indicate that you already have an active [employer] account. You can access your account anytime at [email or web address] Did you know that you can access your hospital and ER discharge instructions at any time in [employer]? You can also review all of your test results from your hospital stay or ER visit. Additional Information If you have questions, please visit the Frequently Asked Questions section of the [employer] website at [email or web address] Remember, [employer] is NOT to be used for urgent needs. For medical emergencies, dial 911. Now available from your iPhone and Android! Please provide this summary of care documentation to your next provider. Your primary care clinician is listed as NONE. If you have any questions after today's visit, please call [phone].

## 2018-08-21 NOTE — PROGRESS NOTES
CARDIAC ELECTROPHYSIOLOGY CLINIC    Subjective:      Brock Patterson is a 42 y.o. male who is seen for follow up after atrial fibrillation ablation 2 weeks ago (8/3/2018)  Bidirectional line of block were present at CTI of typical atrial flutter from previous ablation so no repeated atrial flutter ablation done 2 weeks ago  He went back to work as  and took some days off due to -140 bpm and ECG showed sinus tachycardia  He denied bleeding or melena, fever or odynophagia, blurry vision, TIA or CVA symptoms  In the past he had TIA and loss of right eye vision temporarily 5/24/2018. He went to [address] [place] [address] ER  Head CT was negative  CT with contrast done today but no result yet  He was told to see me for AFIB related TIA  He was using external loop recorder now and his skin was red, irritating  I also had seen him for palpitation and PVC. He had external loop monitor 4/2016 with PAC and PVC  He had been seen in August 2014  He had rare PVC in the past  When he went to outside ER and said he saw ventricular bigeminy     s/p EP for PVCs 6/23/17. There were no PVC to map and ablate. No inducible atrial fibrillation then. He stopped Cardizem and started Toprol . He does not like metoprolol (he thinks this makes his PVC worse)  He had been using Inderal back in 2005 when he had tachycardia and was studied in EP lab at [facility] and was told there was nothing inducible  He then came to [facility] with AFIB and ECG confirmed that in 7/2011 and echo there showed normal heart with possible mild LAE  He had GERD and 2 surgeries so he did EP study with me  4/25/2012 EP and ablation  1. There was no retrograde VA conduction, no evidence of accessory pathway  2. Normal AV herberth antegrade conduction  3. Dual AV herberth physiology with AV herberth echo single beat was present but there was no AVNRT so this was not necessary to ablate  4. Atrial flutter was present and triggered atrial fibrillation.  Mapping showed counterclockwise cavotricuspid isthmus atrial flutter. The isthmus was ablated and bidirectional line of block was obtained. There was no further inducible arrhythmia (atrial tachycardia, flutter or fibrillation) obtained after ablation despite aggressive pacing protocol (at multiple RA and LA via CS sites) with and without isuprel up to the highest allowable dose, with and without anesthetic agent/sedation      Problem List  Date Reviewed: 8/21/2018          Codes Class Noted    S/P ablation of atrial fibrillation ICD-10-CM: Z98.890, Z86.79  ICD-9-CM: V45.89  8/3/2018        Symptomatic PVCs ICD-10-CM: I49.3  ICD-9-CM: 427.69  6/24/2017        Palpitations ICD-10-CM: R00.2  ICD-9-CM: 785.1  6/10/2016    Overview Signed 6/10/2016  8:22 AM by Chris Castro MD     4/16 event monitor, nsr, sinus tach, pacs, pvcs             PAF (paroxysmal atrial fibrillation) ([facility] ICD-10-CM: I48.0  ICD-9-CM: 427.31  7/19/2011    Overview Addendum 4/25/2012  1:44 PM by Giorgi Johnson MD     7/11 converted spontaneously in [facility] er. Normal echo at that time. 4/25/2012: [facility], comprehensive EP study with 3 D mapping under conscious sedation and then general anesthesia  atrial flutter trigerring atrial fibrillation without isuprel were inducible with programmed stimulation at 1 extrastimulus, mapping showed right atrial flutter and ablation at the cavotricuspid isthmus were done. No further atrial flutter/tachycardia, fibrillation were inducible after ablation despite isuprel to wide open amount and burst rapid pacing to 2:1 AV block (240 ms), incremental and programmed atrial stimulation up to 3 extrastimuli to 200 ms at mid, high, low RA, prox CS, mid CS. Distal CS pacing did not capture. Dual AV herberth physiology is noted with one AV herberth echo beats but there was no AVNRT so this was not ablated.     Atrial fibrillation ablation was not done because this was not the primary arrhythmia             SV[facility]raventricular tachycardia) ([facility] ICD-10-CM: I47.1  ICD-9-CM: 427.89  7/19/2011    Overview Signed 7/19/2011  4:44 PM by Nimo Wei MD     approx 2006, with neg ep study and stress test at [facility]. Subsequently treated with inderal.             Family history of coronary artery disease ICD-10-CM: Z82.49  ICD-9-CM: V17.3  7/19/2011              Current Outpatient Prescriptions   Medication Sig Dispense Refill    atorvastatin (LIPITOR) 20 mg tablet Take 20 mg by mouth daily.  ELIQUIS 5 mg tablet Take 1 Tab by mouth two (2) times a day. 60 Tab 5    metFORMIN ER (GLUCOPHAGE XR) 500 mg tablet Take 500 mg by mouth two (2) times a day.  clonazePAM (KLONOPIN) 0.5 mg tablet Take 0.5 mg by mouth daily. Taking one half tablet at night.   As of 4-14-17, taking every am.       No Known Allergies  Past Medical History:   Diagnosis Date    Atrial fibrillation ([facility] 2011    ablation 2012    Diabetes ([facility]     Diabetes ([facility]     GERD (gastroesophageal reflux disease)     Hernia of unspecified site of abdominal cavity without mention of obstruction or gangrene     repaired    Lipoma     PUD (peptic ulcer disease)     Status post Nissen fundoplication (without gastrostomy tube) procedure      Past Surgical History:   Procedure Laterality Date    ABLATION OF SVT  4/25/2012         EP STUD[facility] W[facility]INDUCTION  2005    [facility]    EP STUDY W/INDUCTION  4/25/2012         HX GI  2005, 2011    Nissen    HX HEENT Bilateral     LASIK    HX HERNIA REPAIR Bilateral 2016    inguinal    ISOPROTERENOL NON-COMP CON  4/25/2012         [place] INTRACARDIAC ELECTROPHYSIOLOGIC 3D MAPPING  4/25/2012         [place] UPPER GI ENDOSCOPY,REMOV F.B.  4/20/2018          No family history of AFIB or arrhythmia, just CAD    Social History   Substance Use Topics    Smoking status: Former Smoker     Packs/day: 1.00     Years: 20.00     Quit date: 04/2017    Smokeless tobacco: Current User    Alcohol use No      Review of Systems    Constitutional: Negative for fever, chills, weight loss  HEENT: Negative for nosebleeds  Respiratory: Negative for cough, hemoptysis, sputum production, and wheezing. Cardiovascular: Negative for chest pain, orthopnea, claudication, leg swelling, syncope, and PND. Gastrointestinal: Negative for nausea, vomiting, diarrhea, blood in stool and melena. Genitourinary: Negative for dysuria, urgency, frequency and hematuria. Musculoskeletal: Negative for myalgias. Skin: Negative for rash and itching. Heme: Does not bleed or bruise easily. Neurological: Negative for speech change and focal weakness      Objective:     Visit Vitals    /84 (BP 1 Location: Right arm, BP Patient Position: Sitting)    Pulse (!) 115    Ht 6' 1\" (1.854 m)    Wt 225 lb 4.8 oz (102.2 kg)    SpO2 97%    BMI 29.72 kg/m2      Physical Exam:   General:  alert, cooperative, no distress, appears stated age   Neck: no bruits, no JVD   Chest Wall: respiratory effort normal   Lung: clear to auscultation bilaterally   Heart:  Fast rate regular rhythm, no murmurs, rubs, clicks or gallops   Abdomen:  soft, non-tender   Extremities: No edema, full range of motion     ECG sinus tachycardia    Assessment/Plan:       ICD-10-CM ICD-9-CM    1. PAF (paroxysmal atrial fibrillation) ([facility] I48.0 427.31 AMB POC EKG ROUTINE W/ 12 LEADS, INTER & REP      CARDIAC HOLTER MONITOR, 24 HOURS      LOOP MONITOR   2. S/P ablation of atrial fibrillation Z98.890 V45.89 CARDIAC HOLTER MONITOR, 24 HOURS    Z86.79  LOOP MONITOR   3. Symptomatic PVCs I49.3 427.69    4. S/P ablation of atrial flutter Z98.890 V45.89     Z86.79     5. Family history of coronary artery disease Z82.49 V17.3    6.  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 I told him I can only help with the facts and records   I discussed and he agreed to use inderal 10 mg BID he has at home  He will hold off Multaq he had used in the past  He may need PATRICIA closure or hybrid ablation in the future and ILR as he cannot tolerate external loop electrodes  For now it is still too early post ablation to consider the above measures  I recommend to check CBC as he has sinus tach but he refused  His wife was present  He agrees to 7 days loop monitor 3 months from now for post ablation surveillance of PAF (but if skin is irritated by electrodes he will call me)    Deejay Lewis M.D.  [facility]  Electrophysiology/Cardiology  [facility] and [facility] [place]  [address] [facility], [address] [place] [address]  [phone]

## 2018-10-08 ENCOUNTER — TELEPHONE (OUTPATIENT)
Dept: CARDIOLOGY CLINIC | Age: 38
End: 2018-10-08

## 2018-10-08 RX ORDER — PROPRANOLOL HYDROCHLORIDE 10 MG/1
10 TABLET ORAL 2 TIMES DAILY
Qty: 60 TAB | Refills: 6 | Status: SHIPPED | OUTPATIENT
Start: 2018-10-08

## 2018-10-08 NOTE — TELEPHONE ENCOUNTER
Request for inderal 10mg twice a day. Last office visit 8/21/18, next office visit 11/27/18. Refills per verbal order from Dr. Peter Roy.

## 2018-10-08 NOTE — TELEPHONE ENCOUNTER
----- Message from Corinne Thao RN sent at 10/8/2018  3:39 PM EDT -----  Regarding: FW: Prescription Question  Contact: [phone]      ----- Message -----     From: Elsa Dominguez     Sent: 10/8/2018   3:32 PM       To: [facility] Nurse Pool  Subject: Prescription Question                            HiDr. Lamont,     I need a refill on my Inderal, 10 mg, twice a day.       Benoit Longoria

## 2018-11-27 ENCOUNTER — OFFICE VISIT (OUTPATIENT)
Dept: CARDIOLOGY CLINIC | Age: 38
End: 2018-11-27

## 2018-11-27 VITALS
HEART RATE: 89 BPM | WEIGHT: 233 LBS | RESPIRATION RATE: 16 BRPM | BODY MASS INDEX: 30.88 KG/M2 | DIASTOLIC BLOOD PRESSURE: 70 MMHG | SYSTOLIC BLOOD PRESSURE: 120 MMHG | HEIGHT: 73 IN | OXYGEN SATURATION: 97 %

## 2018-11-27 DIAGNOSIS — Z98.890 S/P ABLATION OF ATRIAL FLUTTER: ICD-10-CM

## 2018-11-27 DIAGNOSIS — Z98.890 S/P ABLATION OF ATRIAL FIBRILLATION: ICD-10-CM

## 2018-11-27 DIAGNOSIS — Z86.79 S/P ABLATION OF ATRIAL FLUTTER: ICD-10-CM

## 2018-11-27 DIAGNOSIS — Z86.79 S/P ABLATION OF ATRIAL FIBRILLATION: ICD-10-CM

## 2018-11-27 DIAGNOSIS — I48.0 PAF (PAROXYSMAL ATRIAL FIBRILLATION) (HCC): Primary | ICD-10-CM

## 2018-11-27 DIAGNOSIS — I49.3 SYMPTOMATIC PVCS: ICD-10-CM

## 2018-11-27 DIAGNOSIS — R00.2 PALPITATIONS: ICD-10-CM

## 2018-11-27 NOTE — PROGRESS NOTES
CARDIAC ELECTROPHYSIOLOGY CLINIC    Subjective:      Jenny Rodriguez is a 42 y.o. male who is seen for follow up after atrial fibrillation ablation  (8/3/2018)  Bidirectional line of block were present at CTI of typical atrial flutter from previous ablation so no repeated atrial flutter ablation done     He went back to work as  and so far only has 30 sec or less palpitation when he rests  He does not have limitation  No bleeding on eliquis  He has DM2    He had rare PVC in the past   s/p EP for PVCs 6/23/17. There were no PVC to map and ablate. He does not like metoprolol (he thinks this makes his PVC worse) inderal works better  He had been using Inderal back in 2005 when he had tachycardia and was studied in EP lab at [facility] and was told there was nothing inducible  He then came to [facility] with AFIB and ECG confirmed that in 7/2011 and echo there showed normal heart with possible mild LAE  He had GERD and 2 surgeries so he did EP study with me  4/25/2012 EP and ablation  1. There was no retrograde VA conduction, no evidence of accessory pathway  2. Normal AV herberth antegrade conduction  3. Dual AV herberth physiology with AV herberth echo single beat was present but there was no AVNRT so this was not necessary to ablate  4. Atrial flutter was present and triggered atrial fibrillation. Mapping showed counterclockwise cavotricuspid isthmus atrial flutter. The isthmus was ablated and bidirectional line of block was obtained.  There was no further inducible arrhythmia (atrial tachycardia, flutter or fibrillation) obtained after ablation despite aggressive pacing protocol (at multiple RA and LA via CS sites) with and without isuprel up to the highest allowable dose, with and without anesthetic agent/sedation      Problem List  Date Reviewed: 11/27/2018          Codes Class Noted    S/P ablation of atrial fibrillation ICD-10-CM: Z98.890, Z86.79  ICD-9-CM: V45.89  8/3/2018        Symptomatic PVCs ICD-10-CM: I49.3  ICD-9-CM: 427.69  6/24/2017        Palpitations ICD-10-CM: R00.2  ICD-9-CM: 785.1  6/10/2016    Overview Signed 6/10/2016  8:22 AM by Hiwot Artis MD     4/16 event monitor, nsr, sinus tach, pacs, pvcs             PAF (paroxysmal atrial fibrillation) ([facility] ICD-10-CM: I48.0  ICD-9-CM: 427.31  7/19/2011    Overview Addendum 4/25/2012  1:44 PM by Jaron Betts MD     7/11 converted spontaneously in [facility] er. Normal echo at that time. 4/25/2012: [facility], comprehensive EP study with 3 D mapping under conscious sedation and then general anesthesia  atrial flutter trigerring atrial fibrillation without isuprel were inducible with programmed stimulation at 1 extrastimulus, mapping showed right atrial flutter and ablation at the cavotricuspid isthmus were done. No further atrial flutter/tachycardia, fibrillation were inducible after ablation despite isuprel to wide open amount and burst rapid pacing to 2:1 AV block (240 ms), incremental and programmed atrial stimulation up to 3 extrastimuli to 200 ms at mid, high, low RA, prox CS, mid CS. Distal CS pacing did not capture. Dual AV herberth physiology is noted with one AV herberth echo beats but there was no AVNRT so this was not ablated. Atrial fibrillation ablation was not done because this was not the primary arrhythmia             SVT (supraventricular tachycardia) ([facility] ICD-10-CM: I47.1  ICD-9-CM: 427.89  7/19/2011    Overview Signed 7/19/2011  4:44 PM by Hiwot Artis MD     approx 2006, with neg ep study and stress test at [facility]. Subsequently treated with inderal.             Family history of coronary artery disease ICD-10-CM: Z82.49  ICD-9-CM: V17.3  7/19/2011              Current Outpatient Medications   Medication Sig Dispense Refill    propranolol (INDERAL) 10 mg tablet Take 1 Tab by mouth two (2) times a day. 60 Tab 6    atorvastatin (LIPITOR) 20 mg tablet Take 20 mg by mouth daily.       ELIQUIS 5 mg tablet Take 1 Tab by mouth two (2) times a day. 60 Tab 5    metFORMIN ER (GLUCOPHAGE XR) 500 mg tablet Take 500 mg by mouth two (2) times a day.  clonazePAM (KLONOPIN) 0.5 mg tablet Take 0.5 mg by mouth daily. Taking one half tablet at night. As of 17, taking every am.       No Known Allergies  Past Medical History:   Diagnosis Date    Atrial fibrillation ([address]     ablation     Diabetes ([facility]     Diabetes ([address]     GERD (gastroesophageal reflux disease)     Hernia of unspecified site of abdominal cavity without mention of obstruction or gangrene     repaired    Lipoma     PUD (peptic ulcer disease)     Status post Nissen fundoplication (without gastrostomy tube) procedure      Past Surgical History:   Procedure Laterality Date    ABLATION OF SVT  2012         EP STUD[facility]INDUCTION      at [facility]    EP STUDY W/INDUCTION  2012         HX GI  ,     Nissen    HX HEENT Bilateral     LASIK    HX HERNIA REPAIR Bilateral 2016    inguinal    ISOPROTERENOL NON-COMP CON  2012         [place] INTRACARDIAC ELECTROPHYSIOLOGIC 3D MAPPING  2012         [place] UPPER GI ENDOSCOPY,REMOV F.B.  2018          No family history of AFIB or arrhythmia, just CAD    Social History     Tobacco Use    Smoking status: Former Smoker     Packs/day: 1.00     Years: 20.00     Pack years: 20.00     Last attempt to quit: 2017     Years since quittin.6    Smokeless tobacco: Current User   Substance Use Topics    Alcohol use: No      Review of Systems    Constitutional: Negative for fever, chills, weight loss  HEENT: Negative for nosebleeds  Respiratory: Negative for cough, hemoptysis, sputum production, and wheezing. Cardiovascular: Negative for chest pain, orthopnea, claudication, leg swelling, syncope, and PND. Gastrointestinal: Negative for nausea, vomiting, diarrhea, blood in stool and melena. Genitourinary: Negative for dysuria, urgency, frequency and hematuria.    Musculoskeletal: Negative for myalgias. Skin: Negative for rash and itching. Heme: Does not bleed or bruise easily. Neurological: Negative for speech change and focal weakness      Objective:     Visit Vitals  /70 (BP 1 Location: Right arm, BP Patient Position: Sitting)   Pulse 89   Resp 16   Ht 6' 1\" (1.854 m)   Wt 233 lb (105.7 kg)   SpO2 97%   BMI 30.74 kg/m²      Physical Exam:   General:  alert, cooperative, no distress, appears stated age   Neck: no bruits, no JVD   Chest Wall: respiratory effort normal   Lung: clear to auscultation bilaterally   Heart:  Normal rate regular rhythm, no murmurs, rubs, clicks or gallops   Abdomen:  soft, non-tender   Extremities: No edema, full range of motion        Assessment/Plan:       ICD-10-CM ICD-9-CM    1. PAF (paroxysmal atrial fibrillation) ([facility]) I48.0 427.31    2. S/P ablation of atrial fibrillation Z98.890 V45.89     Z86.79     3. Symptomatic PVCs I49.3 427.69    4. S/P ablation of atrial flutter Z98.890 V45.89     Z86.79     5. 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  [facility]  Electrophysiology/Cardiology  [facility] and [facility] [place]  [address] , [address]  [place], [address] [place] [address]  [phone]

## 2019-01-18 ENCOUNTER — TELEPHONE (OUTPATIENT)
Dept: CARDIOLOGY CLINIC | Age: 39
End: 2019-01-18

## 2019-01-18 NOTE — TELEPHONE ENCOUNTER
Spoke with patient wife. Liban Madison on HIPPA form. She wanted an appointment soon then May. An appointment has been made for 2/7/19 to see Dr. Enriqueta Ho. Wife verbalized understanding. Pt is not having any problems at this time.

## 2019-01-18 NOTE — TELEPHONE ENCOUNTER
nicole and karl called to set up a meeting with dr Shamir Avitia regarding this patients workers comp case   Phone: [phone]

## 2019-01-18 NOTE — TELEPHONE ENCOUNTER
Pt wife called to discuss moving her husbands appt sooner than current appt scheduled.   Phone # [phone]  Thanks

## 2019-01-21 NOTE — TELEPHONE ENCOUNTER
Notified [facility] that Dr Adonis White is not able to meet but that if they would like his medical records they can send over a signed release for us to send them. She states that they already have his records and that she will inform the  of this.

## 2019-01-21 NOTE — TELEPHONE ENCOUNTER
I do not do worker Dez Gresham had told him that before   He can have records from me and Dr Clancy Dates when he sees physician who does it

## 2019-02-22 DIAGNOSIS — I49.3 SYMPTOMATIC PVCS: ICD-10-CM

## 2019-02-22 DIAGNOSIS — Z82.49 FAMILY HISTORY OF CORONARY ARTERY DISEASE: ICD-10-CM

## 2019-02-22 DIAGNOSIS — R00.2 PALPITATIONS: ICD-10-CM

## 2019-02-22 DIAGNOSIS — I48.0 PAROXYSMAL ATRIAL FIBRILLATION (HCC): ICD-10-CM

## 2019-02-22 DIAGNOSIS — I47.1 SVT (SUPRAVENTRICULAR TACHYCARDIA) (HCC): ICD-10-CM

## 2019-02-22 RX ORDER — APIXABAN 5 MG/1
TABLET, FILM COATED ORAL
Qty: 60 TAB | Refills: 5 | Status: SHIPPED | OUTPATIENT
Start: 2019-02-22 | End: 2019-11-18 | Stop reason: SDUPTHER

## 2019-02-22 NOTE — TELEPHONE ENCOUNTER
Requested Prescriptions     Signed Prescriptions Disp Refills    ELIQUIS 5 mg tablet 60 Tab 5     Sig: TAKE ONE TABLET BY MOUTH 2 TIMES A DAY     Authorizing Provider: Sheryle Basque     Ordering User: Azalea Irene    Per Dr. Vipin Alcala verbal orders

## 2019-04-23 ENCOUNTER — TELEPHONE (OUTPATIENT)
Dept: CARDIOLOGY CLINIC | Age: 39
End: 2019-04-23

## 2019-04-23 NOTE — TELEPHONE ENCOUNTER
Adia Learn at injured workers 's office trying to reach out to set up a conference call regarding the patients workers comp case. The hearing is coming up in less than two weeks and they really need to speak to both Dr Nurys Dhillon and Dr Salvatore Laurent. the  states it can before or after hours it does not matter he can rearrange his schedule to accommodate the doctors so that he can speak to them.  [phone]

## 2019-04-25 ENCOUNTER — TELEPHONE (OUTPATIENT)
Dept: CARDIOLOGY CLINIC | Age: 39
End: 2019-04-25

## 2019-04-25 NOTE — TELEPHONE ENCOUNTER
Anni Dougherty with [employer] is calling to schedule a phone call with Dr. Amey Schaumann regarding the patient's condition. Please advise.     Phone #: [phone]  Thanks

## 2019-05-03 NOTE — TELEPHONE ENCOUNTER
Called Donald Silver. Notified her Dr. Dante Barksdale is out of the office today. She asked if he could set aside time next week to discuss patient's condition for hearing coming up later this month. I said I was under the impression they already spoke with a physician in our office and did not need to speak with Dr. Dante Barksdale also. She said they would still like to set up interview with Dr. Dante Barksdale. I told her Dr. Dante Barksdale has a full schedule every day, but I would send him a message and if he has time he can call and set up appointment.

## 2019-05-03 NOTE — TELEPHONE ENCOUNTER
Nichole Hannon called from [employer] stating that she will need to set up a phone interview with Dr. Kiana Laureano.   Phone #[phone]  Thanks

## 2019-05-14 NOTE — TELEPHONE ENCOUNTER
Harper Trivedi with [employer] is calling to schedule a phone call with Dr. Vann Agent to discuss the patient prior to his hearing this coming Monday 5/20     Phone #: [phone]

## 2019-05-14 NOTE — TELEPHONE ENCOUNTER
I spoke with Yara Barrios on May 3rd (previous note). Message was given to Dr. CHAN [facility].

## 2019-05-29 ENCOUNTER — TELEPHONE (OUTPATIENT)
Dept: CARDIOLOGY CLINIC | Age: 39
End: 2019-05-29

## 2019-05-29 NOTE — TELEPHONE ENCOUNTER
Sadie rasmussen from [facility] is calling to discuss Dr. Mann Records availability for his depositions for the worker's comp case of this patient.         Phone: [phone]

## 2019-08-13 RX ORDER — ROSUVASTATIN CALCIUM 5 MG/1
5 TABLET, COATED ORAL
COMMUNITY
End: 2021-03-17

## 2019-08-20 ENCOUNTER — ANESTHESIA EVENT (OUTPATIENT)
Dept: ENDOSCOPY | Age: 39
End: 2019-08-20
Payer: COMMERCIAL

## 2019-08-21 ENCOUNTER — ANESTHESIA (OUTPATIENT)
Dept: ENDOSCOPY | Age: 39
End: 2019-08-21
Payer: COMMERCIAL

## 2019-08-21 ENCOUNTER — HOSPITAL ENCOUNTER (OUTPATIENT)
Age: 39
Setting detail: OUTPATIENT SURGERY
Discharge: HOME | End: 2019-08-21
Attending: INTERNAL MEDICINE | Admitting: INTERNAL MEDICINE
Payer: COMMERCIAL

## 2019-08-21 VITALS
SYSTOLIC BLOOD PRESSURE: 111 MMHG | DIASTOLIC BLOOD PRESSURE: 74 MMHG | TEMPERATURE: 98.2 F | WEIGHT: 228.4 LBS | HEIGHT: 73 IN | RESPIRATION RATE: 17 BRPM | OXYGEN SATURATION: 98 % | BODY MASS INDEX: 30.27 KG/M2 | HEART RATE: 72 BPM

## 2019-08-21 LAB
GLUCOSE BLD STRIP.AUTO-MCNC: 181 MG/DL (ref 65–100)
SERVICE CMNT-IMP: ABNORMAL

## 2019-08-21 PROCEDURE — 82962 GLUCOSE BLOOD TEST: CPT

## 2019-08-21 PROCEDURE — 74011250636 HC RX REV CODE- 250/636: Performed by: NURSE ANESTHETIST, CERTIFIED REGISTERED

## 2019-08-21 PROCEDURE — 76040000019: Performed by: INTERNAL MEDICINE

## 2019-08-21 PROCEDURE — 77030034675 HC CAP BRAVO PH W DEL SYS GVIM -C: Performed by: INTERNAL MEDICINE

## 2019-08-21 PROCEDURE — 76060000031 HC ANESTHESIA FIRST 0.5 HR: Performed by: INTERNAL MEDICINE

## 2019-08-21 RX ORDER — LIDOCAINE HYDROCHLORIDE 20 MG/ML
INJECTION, SOLUTION EPIDURAL; INFILTRATION; INTRACAUDAL; PERINEURAL AS NEEDED
Status: DISCONTINUED | OUTPATIENT
Start: 2019-08-21 | End: 2019-08-21 | Stop reason: HOSPADM

## 2019-08-21 RX ORDER — NALOXONE HYDROCHLORIDE 0.4 MG/ML
0.4 INJECTION, SOLUTION INTRAMUSCULAR; INTRAVENOUS; SUBCUTANEOUS
Status: CANCELLED | OUTPATIENT
Start: 2019-08-21 | End: 2019-08-21

## 2019-08-21 RX ORDER — LORATADINE 10 MG/1
10 TABLET ORAL DAILY
COMMUNITY

## 2019-08-21 RX ORDER — PROPOFOL 10 MG/ML
INJECTION, EMULSION INTRAVENOUS
Status: DISCONTINUED | OUTPATIENT
Start: 2019-08-21 | End: 2019-08-21 | Stop reason: HOSPADM

## 2019-08-21 RX ORDER — MIDAZOLAM HYDROCHLORIDE 1 MG/ML
.25-5 INJECTION, SOLUTION INTRAMUSCULAR; INTRAVENOUS
Status: CANCELLED | OUTPATIENT
Start: 2019-08-21 | End: 2019-08-21

## 2019-08-21 RX ORDER — DEXTROMETHORPHAN/PSEUDOEPHED 2.5-7.5/.8
1.2 DROPS ORAL
Status: CANCELLED | OUTPATIENT
Start: 2019-08-21

## 2019-08-21 RX ORDER — FLUMAZENIL 0.1 MG/ML
0.2 INJECTION INTRAVENOUS
Status: CANCELLED | OUTPATIENT
Start: 2019-08-21 | End: 2019-08-21

## 2019-08-21 RX ORDER — EPINEPHRINE 0.1 MG/ML
1 INJECTION INTRACARDIAC; INTRAVENOUS
Status: CANCELLED | OUTPATIENT
Start: 2019-08-21 | End: 2019-08-22

## 2019-08-21 RX ORDER — ATROPINE SULFATE 0.1 MG/ML
0.5 INJECTION INTRAVENOUS
Status: CANCELLED | OUTPATIENT
Start: 2019-08-21 | End: 2019-08-22

## 2019-08-21 RX ORDER — SODIUM CHLORIDE 9 MG/ML
50 INJECTION, SOLUTION INTRAVENOUS CONTINUOUS
Status: CANCELLED | OUTPATIENT
Start: 2019-08-21 | End: 2019-08-21

## 2019-08-21 RX ORDER — SODIUM CHLORIDE 9 MG/ML
INJECTION, SOLUTION INTRAVENOUS
Status: DISCONTINUED | OUTPATIENT
Start: 2019-08-21 | End: 2019-08-21 | Stop reason: HOSPADM

## 2019-08-21 RX ORDER — PROPOFOL 10 MG/ML
INJECTION, EMULSION INTRAVENOUS AS NEEDED
Status: DISCONTINUED | OUTPATIENT
Start: 2019-08-21 | End: 2019-08-21 | Stop reason: HOSPADM

## 2019-08-21 RX ADMIN — LIDOCAINE HYDROCHLORIDE 100 MG: 20 INJECTION, SOLUTION INTRAVENOUS at 08:12

## 2019-08-21 RX ADMIN — PROPOFOL 140 MCG/KG/MIN: 10 INJECTION, EMULSION INTRAVENOUS at 08:12

## 2019-08-21 RX ADMIN — SODIUM CHLORIDE: 9 INJECTION, SOLUTION INTRAVENOUS at 07:59

## 2019-08-21 RX ADMIN — PROPOFOL 100 MG: 10 INJECTION, EMULSION INTRAVENOUS at 08:12

## 2019-08-21 NOTE — ANESTHESIA POSTPROCEDURE EVALUATION
Procedure(s):  ESOPHAGOGASTRODUODENOSCOPY (EGD) WITH BRAVO  ESOPHAGOGASTRODUODENOSCOPY (EGD). MAC    Anesthesia Post Evaluation      Multimodal analgesia: multimodal analgesia not used between 6 hours prior to anesthesia start to PACU discharge  Patient location during evaluation: PACU  Patient participation: complete - patient participated  Level of consciousness: awake and alert  Pain score: 0  Pain management: satisfactory to patient  Airway patency: patent  Anesthetic complications: no  Cardiovascular status: acceptable  Respiratory status: acceptable  Hydration status: acceptable  Post anesthesia nausea and vomiting:  none      Vitals Value Taken Time   /69 8/21/2019  8:49 AM   Temp 36.8 °C (98.2 °F) 8/21/2019  8:29 AM   Pulse 71 8/21/2019  8:52 AM   Resp 14 8/21/2019  8:52 AM   SpO2 98 % 8/21/2019  8:52 AM   Vitals shown include unvalidated device data.

## 2019-08-21 NOTE — PROCEDURES
[address]  [address] [facility], [id number] [place] [address]       [phone]                   2019                EGD Operative Report  Polo Anders  :  1980  [facility] Medical Record Number:  [id number]      Indication:  GERD     : Terri Castillo MD    Assistants: None    Referring Provider: Dr Fili Moreno    Anesthesia/Sedation:  MAC anesthesia Propofol    Airway assessment: No airway problems anticipated    Pre-Procedural Exam:      Airway: clear, no airway problems anticipated  Heart: RRR, without gallops or rubs  Lungs: clear bilaterally without wheezes, crackles, or rhonchi  Abdomen: soft, nontender, nondistended, bowel sounds present  Mental Status: awake, alert and oriented to person, place and time       Procedure Details     After infomed consent was obtained for the procedure, with all risks and benefits of procedure explained the patient was taken to the endoscopy suite and placed in the left lateral decubitus position. Following sequential administration of sedation as per above, the endoscope was inserted into the mouth and advanced under direct vision to second portion of the duodenum. A careful inspection was made as the gastroscope was withdrawn, including a retroflexed view of the proximal stomach; findings and interventions are described below. Findings:   Esophagus:normal esophagus. GEJ 40 cm in distance form the incisors. Placement of bravo PH capsule attached to esophageal wall at 6 cm above GEJ  Stomach: normal mucosa. Evidence of previous fundoplication- slightly loose  Duodenum/jejunum: normal    Therapies:  Placement of Bravo ph capsule    Specimens:  As above    Implants: Bravo PH            Complications:   None; patient tolerated the procedure well. EBL:  None.            Impression:    S/p nissen fundoplication                           Placement of bravo PH capsule     Recommendations:    -GERD diet: avoid fried and fatty foods.  peppermint, chocolate, alcohol, coffee, citrus fruits and juices, tomoato products; avoid lying down for 2 to 3 hours after eating.,  -No NSAIDS  -NO ANTIACIDS FOR 48 HRS  -Resume AC today  -Resume other meds today  -Call office for results in 1-2 weeks  -F/u with Dr Nat Butcher as previously scheduled      Sandro Man MD

## 2019-08-21 NOTE — PERIOP NOTES
Report from [facility], see anesthesia record. ABD remains soft and non-tender post procedure. Pt has no complaints at this time and tolerated the procedure well. Endoscope was pre-cleaned at bedside immediately following procedure by Yusef Vidal. Bravo clip placed by Dr. John Amin to esophagus.    Lot [id number]  Exp Date 3/4/2020

## 2019-08-21 NOTE — ROUTINE PROCESS
Micah Nguyen  1980  [id number]    Situation:  Verbal report received from: Esperanza Todd RN  Procedure: Procedure(s):  ESOPHAGOGASTRODUODENOSCOPY (EGD) WITH BRAVO  ESOPHAGOGASTRODUODENOSCOPY (EGD)    Background:    Preoperative diagnosis: REFLUX  Postoperative diagnosis: S/P Nissen    :  Dr. John Amin  Assistant(s): Endoscopy Technician-1: Raul Aguillon; Toyin Andrews  Endoscopy RN-1: Edison Navarro RN    Specimens: * No specimens in log *  H. Pylori  no    Assessment:  Intra-procedure medications   Anesthesia gave intra-procedure sedation and medications, see anesthesia flow sheet yes    Intravenous fluids: NS@ KVO     Vital signs stable     Abdominal assessment: round and soft     Recommendation:  Discharge patient per MD order.   Family or Friend   Permission to share finding with family or friend yes

## 2019-08-21 NOTE — DISCHARGE INSTRUCTIONS
[address] [facility], [address] [facility]  [phone]               EGD Gail Lyn  [id number]  1980    DISCOMFORT:  Sore throat- throat lozenges or warm salt water gargle  redness at IV site- apply warm compress to area; if redness or soreness persist- contact your physician  Gaseous discomfort- walking, belching will help relieve any discomfort  You may not operate a vehicle for 12 hours  You may not engage in an occupation involving machinery or appliances for rest of today  You may not drink alcoholic beverages for at least 12 hours  Avoid making any critical decisions for at least 24 hour  DIET  You may eat and drink now and after you leave. You may resume your regular diet - however -  remember your colon is empty and a heavy meal will produce gas. Avoid these foods:  vegetables, fried / greasy foods, carbonated drinks    ACTIVITY  You may resume your normal daily activities   Spend the remainder of the day resting -  avoid any strenuous activity. CALL M.D. ANY SIGN OF   Increasing pain, nausea, vomiting  Abdominal distension (swelling)  New increased bleeding (oral or rectal)  Fever (chills)  Pain in chest area  Bloody discharge from nose or mouth  Shortness of breath    Follow-up Instructions:   Call Dr. Hernan Ricks for any questions or problems.               Telephone # [phone]    ENDOSCOPY FINDINGS:   Your endoscopy showed evidence of previous fundoplication          Signed By: Tariq Reynaga MD     8/21/2019  8:28 AM

## 2019-08-21 NOTE — ANESTHESIA PREPROCEDURE EVALUATION
Relevant Problems   No relevant active problems       Anesthetic History   No history of anesthetic complications            Review of Systems / Medical History  Patient summary reviewed and pertinent labs reviewed    Pulmonary  Within defined limits                 Neuro/Psych         TIA     Cardiovascular            Dysrhythmias : atrial fibrillation      Exercise tolerance: >4 METS  Comments: Held Eliquis since 8/19/19.    GI/Hepatic/Renal     GERD: well controlled      PUD     Endo/Other    Diabetes: well controlled, type 2         Other Findings              Physical Exam    Airway  Mallampati: II    Neck ROM: normal range of motion   Mouth opening: Normal     Cardiovascular    Rhythm: irregular  Rate: normal         Dental  No notable dental hx       Pulmonary  Breath sounds clear to auscultation               Abdominal  GI exam deferred       Other Findings            Anesthetic Plan    ASA: 2  Anesthesia type: MAC          Induction: Intravenous  Anesthetic plan and risks discussed with: Patient

## 2019-08-21 NOTE — H&P
[address] [facility] [address]  [address] [facility], [address] [facility]  [phone]                     History and Physical     NAME: Kuldip Tucker   :  1980   MRN:  [id number]     HPI:  35 yo male with h/o GERD s/p fundoplication followed by Dr Chung Short and Dr Johanny Pappas at [facility]. He was scheduled for outpatient placement of Yin ph study given ongoing and persistent stx of gerd. Pt is being evaluated for re-do of anti-reflux surgery and Yin Ph stduy has been requested prior to any surgical intervention. He has had previously Yin ph study 1 yr ago and study was not completed due to patient discomfort and need for removal of device. Pt has now agreed to have a repeat study he is on chronic AC for afib and it has been on hold for now. He has been off PPi's for weeks given poor response.      Past Surgical History:   Procedure Laterality Date    ABLATION OF SVT  2012         [facility]UCTION      [facility]    EP STUDY W/INDUCTION  2012         HX GI  ,     Nissen    HX HEENT Bilateral     LASIK    HX HERNIA REPAIR Bilateral 2016    inguinal    HX REFRACTIVE SURGERY      ISOPROTERENOL NON-COMP CON  2012         VT INTRACARDIAC ELECTROPHYSIOLOGIC 3D MAPPING  2012         VT UPPER GI ENDOSCOPY,REMOV F.B.  2018          Past Medical History:   Diagnosis Date    Atrial fibrillation ([address]     ablation     Diabetes ([address]     Diabetes [facility]     GERD (gastroesophageal reflux disease)     Hernia of unspecified site of abdominal cavity without mention of obstruction or gangrene     repaired    Lipoma     PUD (peptic ulcer disease)     Status post Nissen fundoplication (without gastrostomy tube) procedure      Social History     Tobacco Use    Smoking status: Former Smoker     Packs/day: 1.00     Years: 20.00     Pack years: 20.00     Last attempt to quit: 2017     Years since quittin.3    Smokeless tobacco: Current User   Substance Use Topics    Alcohol use: No    Drug use: No     No Known Allergies  Family History   Problem Relation Age of Onset    Heart Disease Father     Diabetes Father     Cancer Father         skin    Thyroid Disease Mother      No current facility-administered medications for this encounter. PHYSICAL EXAM:  General: WD, WN. Alert, cooperative, no acute distress    HEENT: NC, Atraumatic. PERRLA, EOMI. Anicteric sclerae. Lungs:  CTA Bilaterally. No Wheezing/Rhonchi/Rales. Heart:  Regular  rhythm,  No murmur, No Rubs, No Gallops  Abdomen: Soft, Non distended, Non tender.  +Bowel sounds, no HSM  Extremities: No c/c/e  Neurologic:  CN 2-12 gi, Alert and oriented X 3. No acute neurological distress   Psych:   Good insight. Not anxious nor agitated. Assessment:   I have reviewed with the patient +/- family alternatives,benefits and risks for the procedure, as well as potential complications(with emphasis on, but not limited to, bleeding, perforation, cardiovascular/cerebrovascular/pulmonary events, reactions to the medications, infection, risk of missing a lesions/a cancer, and the imponderables including death), alternative options, and patient/family voices understanding.       Plan:   · Endoscopic procedure  · Conscious sedation or MAC

## 2019-09-05 NOTE — PROCEDURES
[address]  [address] [facility], [address] [facility]       [phone]                   2019                EGD Operative Report  Arville Skiff  :  1980  [facility] Medical Record Number:  [id number]      Indication:  GERD     : Christine Johnson MD    Assistants: None    Referring Provider:Dr Duff      Procedure Details : please refer to [facility] study EGD reports for details. Procedure was done while pt off PPI's       Findings:     Study duration-  45:39 Hrs    Acid reflux Analysis-     1st day Sudarshan Ser score: 3.6 ( normal is < 14 )     2nd day De Meester score: 0.5    SAP ( symptom assocaition prob. )  ( > 95% is significant)    Heartburn is 99.2  Chest pain 99.6  [id number]             Complications:   None; patient tolerated the procedure well.                Impression:    S/P Nissen  Ph study                           Normal Bravo ph study from a Sudarshan Ser standpoint                           However, stx correlation with reflux episodes is present    Recommendations:    F/U office as previously scheduled with referring providers  Please call for any questions/concerns      Christine Johnson MD

## 2021-03-05 ENCOUNTER — HOSPITAL ENCOUNTER (OUTPATIENT)
Dept: CT IMAGING | Age: 41
Discharge: HOME | End: 2021-03-05
Payer: COMMERCIAL

## 2021-03-05 ENCOUNTER — TRANSCRIBE ORDER (OUTPATIENT)
Dept: SCHEDULING | Age: 41
End: 2021-03-05

## 2021-03-05 DIAGNOSIS — R10.32 ABDOMINAL PAIN, LEFT LOWER QUADRANT: Primary | ICD-10-CM

## 2021-03-05 DIAGNOSIS — R10.32 ABDOMINAL PAIN, LEFT LOWER QUADRANT: ICD-10-CM

## 2021-03-05 PROCEDURE — 74011000636 HC RX REV CODE- 636: Performed by: NURSE PRACTITIONER

## 2021-03-05 PROCEDURE — 74177 CT ABD & PELVIS W/CONTRAST: CPT

## 2021-03-05 RX ADMIN — IOPAMIDOL 100 ML: 755 INJECTION, SOLUTION INTRAVENOUS at 15:39

## 2021-03-12 ENCOUNTER — TELEPHONE (OUTPATIENT)
Dept: SURGERY | Age: 41
End: 2021-03-12

## 2021-03-12 ENCOUNTER — DOCUMENTATION ONLY (OUTPATIENT)
Dept: SURGERY | Age: 41
End: 2021-03-12

## 2021-03-12 ENCOUNTER — TELEPHONE (OUTPATIENT)
Dept: CARDIOLOGY CLINIC | Age: 41
End: 2021-03-12

## 2021-03-12 ENCOUNTER — OFFICE VISIT (OUTPATIENT)
Dept: SURGERY | Age: 41
End: 2021-03-12
Payer: COMMERCIAL

## 2021-03-12 VITALS
SYSTOLIC BLOOD PRESSURE: 120 MMHG | HEIGHT: 73 IN | DIASTOLIC BLOOD PRESSURE: 82 MMHG | OXYGEN SATURATION: 98 % | HEART RATE: 95 BPM | BODY MASS INDEX: 31.74 KG/M2 | RESPIRATION RATE: 20 BRPM | WEIGHT: 239.5 LBS | TEMPERATURE: 98.5 F

## 2021-03-12 DIAGNOSIS — K40.91 RECURRENT INGUINAL HERNIA OF RIGHT SIDE WITHOUT OBSTRUCTION OR GANGRENE: Primary | ICD-10-CM

## 2021-03-12 PROBLEM — K21.9 GERD (GASTROESOPHAGEAL REFLUX DISEASE): Status: ACTIVE | Noted: 2021-03-12

## 2021-03-12 PROBLEM — E11.9 TYPE II DIABETES MELLITUS (HCC): Status: ACTIVE | Noted: 2021-03-12

## 2021-03-12 PROBLEM — E78.00 HYPERCHOLESTEREMIA: Status: ACTIVE | Noted: 2021-03-12

## 2021-03-12 PROCEDURE — 99203 OFFICE O/P NEW LOW 30 MIN: CPT | Performed by: SURGERY

## 2021-03-12 RX ORDER — LOVASTATIN 20 MG/1
20 TABLET ORAL
COMMUNITY

## 2021-03-12 RX ORDER — OMEPRAZOLE 20 MG/1
20 CAPSULE, DELAYED RELEASE ORAL DAILY
COMMUNITY

## 2021-03-12 NOTE — H&P (VIEW-ONLY)
Surgery History and Physical 
 
Subjective:  
  
Sloan Rahman is a 40 y.o.white male who presents for evaluation of a recurrent right inguinal hernia.  About a month ago, Mr. Rahman developed pain in both groin, left greater than right.  The pain has been intermittent.  He has not noticed a bulge.  He is eating fine and moving his bowels and urinating normally.  He had laparoscopic bilateral inguinal inguinal herniorrhaphies with mesh by Dr. TU Miller in 2016.  He also had a procedure for an undescended right testicle as a child and 2 laparoscopic Nissen fundoplications.  He had a CT of the abdomen and pelvis which revealed a small fat-containing right inguinal hernia.  He called Dr. Miller's office, but was not able to get an appointment. 
 
Of note, he is on Eliquis for afib. 
 
Past Medical History:  
Diagnosis Date  
• Atrial fibrillation (HCC) 2011  
 ablation 2012  
• Diabetes (HCC)   
• Diabetes (HCC)   
• GERD (gastroesophageal reflux disease)   
• Hypercholesterolemia   
• Obesity (BMI 30.0-34.9)   
• PUD (peptic ulcer disease)   
• Stroke (HCC)   
 TIA.  
 
Past Surgical History:  
Procedure Laterality Date  
• ABLATION OF SVT  4/25/2012  
    
• EP STUDY W/INDUCTION  2005  
 at [facility]  
• EP STUDY W/INDUCTION  4/25/2012  
    
• HX GI  2005, 2011  
 Lap Nissen.  
• HX HEENT Bilateral   
 LASIK  
• HX HEENT Bilateral   
 Blepharoplasty.  
• HX HERNIA REPAIR Bilateral 2016  
 Lap inguinal with mesh (Dr. Aria Miller).  
• HX REFRACTIVE SURGERY    
• HX UROLOGICAL Right   
 Undescended testicle.  
• ISOPROTERENOL NON-COMP CON  4/25/2012  
    
• MT INTRACARDIAC ELECTROPHYSIOLOGIC 3D MAPPING  4/25/2012  
    
• MT UPPER GI ENDOSCOPY,REMOV F.B.  4/20/2018  
    
  
Family History  
Problem Relation Age of Onset  
• Heart Disease Father   
• Diabetes Father   
• Cancer Father   
     skin  
• Thyroid Disease Mother   
 
Social History  
 
Tobacco Use  
• Smoking status: Former Smoker  
  Packs/day: 1.00  
  Years:  20.00 Pack years: 20.00 Quit date: 04/2017 Years since quitting: 3.9  Smokeless tobacco: Current User Substance Use Topics  Alcohol use: No  
  
Prior to Admission medications Medication Sig Start Date End Date Taking? Authorizing Provider  
lovastatin (MEVACOR) 20 mg tablet Take 20 mg by mouth nightly. Yes Provider, Historical  
omeprazole (PRILOSEC) 20 mg capsule Take 20 mg by mouth daily. Yes Provider, Historical  
ELIQUIS 5 mg tablet Take 1 Tab by mouth two (2) times a day. Pt needs appt for future refills 11/19/19  Yes Zari Venegas MD  
loratadine (CLARITIN) 10 mg tablet Take 10 mg by mouth daily. Yes Provider, Historical  
propranolol (INDERAL) 10 mg tablet Take 1 Tab by mouth two (2) times a day. Patient taking differently: Take 10 mg by mouth two (2) times a day. Pt reports takes every 8 hrs 10/8/18  Yes Dai Islas MD  
metFORMIN ER (GLUCOPHAGE XR) 500 mg tablet Take 500 mg by mouth two (2) times a day. Pt takes 2 tabs BID   Yes Provider, Historical  
clonazePAM (KLONOPIN) 0.5 mg tablet Take 0.5 mg by mouth two (2) times a day. Pt reports taking . 75mg 2 per day   Yes OtherMckayla MD  
rosuvastatin (CRESTOR) 5 mg tablet Take 5 mg by mouth every Monday, Wednesday, Friday. Indications: high cholesterol    Provider, Historical  
  
No Known Allergies Review of Systems: A comprehensive review of systems was negative except for that written in the History of Present Illness. Objective:  
 
 Physical Exam: 
GENERAL: alert, cooperative, no distress, appears stated age, EYE: negative findings: anicteric sclera, LYMPHATIC: Cervical, supraclavicular nodes normal. , THROAT & NECK: normal, LUNG: clear to auscultation bilaterally, HEART: regular rate and rhythm, ABDOMEN: Soft, NT, ND., GROIN: On the left, there is no hernia. On the right, there is no definite hernia., EXTREMITIES:  no edema, SKIN: Normal., NEUROLOGIC: negative, PSYCHIATRIC: non focal 
 
Assessment: Occult, recurrent right inguinal hernia without gangrene or obstruction. Plan: Mr. Bandar Olivares would like to have his hernia repaired and is fine with Thursday, 3/25. I discussed the risks of the procedure including bleeding, infection, wound healing problems, recurrent hernia, mesh complications, persistent pain, injury to the spermatic cord or cutaneous nerves, urinary retention, and reaction to the prep or local and general anesthetic. He understands the risks; any and all questions were answered to his satisfaction. Mr. Bandar Olivares will be scheduled for an elective outpatient right inguinal herniorrhaphy with mesh under general with LMA. My office will try to get his operative note from Dr. Sravani Aguillon office. He will need to be cleared by Cardiology to come off his Eliquis for at least 48 hours prior to the procedure. The patient was counseled at length about the risks of abiola Covid-19 during their perioperative period and any recovery window from their procedure. The patient was made aware that abiola Covid-19  may worsen their prognosis for recovering from their procedure and lend to a higher morbidity and/or mortality risk. All material risks, benefits, and reasonable alternatives including postponing the procedure were discussed. The patient does wish to proceed with the procedure at this time.

## 2021-03-12 NOTE — TELEPHONE ENCOUNTER
Called placed to Cardiology office spoke with Con Garcia. Informed requesting a Cardiology clearance for this pt to be off of the Eliquis 48 hrs prior to surgery tentative date of 03/25. Was informed that the last time this pt was seen by Dr. Salvatore Rachel was May 2018. She will get the information to Dr. Salvatore Rachel and his Nurse. Will have them call the office with any questions.

## 2021-03-12 NOTE — PROGRESS NOTES
Surgery History and Physical    Subjective:      Jeannie Santa is a 41 y. o.white male who presents for evaluation of a recurrent right inguinal hernia. About a month ago, Mr. Carin Mcclure developed pain in both groin, left greater than right. The pain has been intermittent. He has not noticed a bulge. He is eating fine and moving his bowels and urinating normally. He had laparoscopic bilateral inguinal inguinal herniorrhaphies with mesh by Dr. Garcia Tapia in 2016. He also had a procedure for an undescended right testicle as a child and 2 laparoscopic Nissen fundoplications. He had a CT of the abdomen and pelvis which revealed a small fat-containing right inguinal hernia. He called Dr. Breanne Hernandez office, but was not able to get an appointment. Of note, he is on Eliquis for afib. Past Medical History:   Diagnosis Date    Atrial fibrillation [facility]) 2011    ablation 2012    [facility]     Diabetes ([facility]     GERD (gastroesophageal reflux disease)     Hypercholesterolemia     Obesity (BMI 30.0-34. 9)     PUD (peptic ulcer disease)     Stroke ([facility]     TIA. Past Surgical History:   Procedure Laterality Date    ABLATION OF SVT  4/25/2012         [facility]  [facility]         HX GI  2005, 2011    Lap Nissen.  HX HEENT Bilateral     LASIK    HX HEENT Bilateral     Blepharoplasty.  HX HERNIA REPAIR Bilateral 2016    Lap inguinal with mesh (Dr. Katelynn Ho).  HX REFRACTIVE SURGERY      HX UROLOGICAL Right     Undescended testicle.     ISOPROTERENOL NON-COMP CON  4/25/2012         AR INTRACARDIAC ELECTROPHYSIOLOGIC 3D MAPPING  4/25/2012         AR UPPER GI ENDOSCOPY,REMOV F.B.  4/20/2018           Family History   Problem Relation Age of Onset    Heart Disease Father     Diabetes Father     Cancer Father         skin    Thyroid Disease Mother      Social History     Tobacco Use    Smoking status: Former Smoker     Packs/day: 1.00     Years: 20.00     Pack years: 20.00     Quit date: 04/2017     Years since quitting: 3.9    Smokeless tobacco: Current User   Substance Use Topics    Alcohol use: No      Prior to Admission medications    Medication Sig Start Date End Date Taking? Authorizing Provider   lovastatin (MEVACOR) 20 mg tablet Take 20 mg by mouth nightly. Yes Provider, Historical   omeprazole (PRILOSEC) 20 mg capsule Take 20 mg by mouth daily. Yes Provider, Historical   ELIQUIS 5 mg tablet Take 1 Tab by mouth two (2) times a day. Pt needs appt for future refills 11/19/19  Yes Joshua Carbone MD   loratadine (CLARITIN) 10 mg tablet Take 10 mg by mouth daily. Yes Provider, Historical   propranolol (INDERAL) 10 mg tablet Take 1 Tab by mouth two (2) times a day. Patient taking differently: Take 10 mg by mouth two (2) times a day. Pt reports takes every 8 hrs 10/8/18  Yes Nakul Pina MD   metFORMIN ER (GLUCOPHAGE XR) 500 mg tablet Take 500 mg by mouth two (2) times a day. Pt takes 2 tabs BID   Yes Provider, Historical   clonazePAM (KLONOPIN) 0.5 mg tablet Take 0.5 mg by mouth two (2) times a day. Pt reports taking . 75mg 2 per day   Yes Mckayla Randhawa MD   rosuvastatin (CRESTOR) 5 mg tablet Take 5 mg by mouth every Monday, Wednesday, Friday. Indications: high cholesterol    Provider, Historical      No Known Allergies    Review of Systems:  A comprehensive review of systems was negative except for that written in the History of Present Illness. Objective:      Physical Exam:  GENERAL: alert, cooperative, no distress, appears stated age, EYE: negative findings: anicteric sclera, LYMPHATIC: Cervical, supraclavicular nodes normal. , THROAT & NECK: normal, LUNG: clear to auscultation bilaterally, HEART: regular rate and rhythm, ABDOMEN: Soft, NT, ND., GROIN: On the left, there is no hernia.   On the right, there is no definite hernia., EXTREMITIES:  no edema, SKIN: Normal., NEUROLOGIC: negative, PSYCHIATRIC: non focal    Assessment: Occult, recurrent right inguinal hernia without gangrene or obstruction. Plan:     Mr. Aubrey Denny would like to have his hernia repaired and is fine with Thursday, 3/25. I discussed the risks of the procedure including bleeding, infection, wound healing problems, recurrent hernia, mesh complications, persistent pain, injury to the spermatic cord or cutaneous nerves, urinary retention, and reaction to the prep or local and general anesthetic. He understands the risks; any and all questions were answered to his satisfaction. Mr. Aubrey Denny will be scheduled for an elective outpatient right inguinal herniorrhaphy with mesh under general with LMA. My office will try to get his operative note from Dr. Georgia Diehl office. He will need to be cleared by Cardiology to come off his Eliquis for at least 48 hours prior to the procedure. The patient was counseled at length about the risks of abiola Covid-19 during their perioperative period and any recovery window from their procedure. The patient was made aware that abiola Covid-19  may worsen their prognosis for recovering from their procedure and lend to a higher morbidity and/or mortality risk. All material risks, benefits, and reasonable alternatives including postponing the procedure were discussed. The patient does wish to proceed with the procedure at this time.

## 2021-03-12 NOTE — PROGRESS NOTES
1. Have you been to the ER, urgent care clinic since your last visit? Hospitalized since your last visit? No    2. Have you seen or consulted any other health care providers outside of the [address] [place] [address] since your last visit? Include any pap smears or colon screening.  No

## 2021-03-12 NOTE — TELEPHONE ENCOUNTER
Terra Motley from [facility] called to state patient requires eliquis clearance. Patient is projected to have surgery 03/25.     Phone: [phone]  Fax: [phone]

## 2021-03-15 ENCOUNTER — TELEPHONE (OUTPATIENT)
Dept: SURGERY | Age: 41
End: 2021-03-15

## 2021-03-15 NOTE — TELEPHONE ENCOUNTER
A faxed documentation was received regarding the surgical clearance from the Cardiologist office. Information to be given Dr. Stacy Holley.

## 2021-03-15 NOTE — TELEPHONE ENCOUNTER
Yes. [id number] [address] for surgery without special precautions or further cardiac testing. Hold eliquis for 48 hrs prior to procedure.

## 2021-03-15 NOTE — TELEPHONE ENCOUNTER
Looks like it has been a while since you saw him. Are you okay providing clearance for him to hold eliquis?

## 2021-03-15 NOTE — TELEPHONE ENCOUNTER
Left voicemail for patient regarding surgical information :NO DETAILS LEFT ; letter sent THROUGH MY CHART/MAIL

## 2021-03-16 ENCOUNTER — TELEPHONE (OUTPATIENT)
Dept: SURGERY | Age: 41
End: 2021-03-16

## 2021-03-16 NOTE — TELEPHONE ENCOUNTER
Pt was called verified using 2 pt identifiers. Informed him of the Cardiac clearance approval from the Cardiologist Dr. Renzo Grant. Per Dr. Jone Zamorano he is to take the last dose of Eliquis on Monday 22 and then hold the medication for surgery that has been scheduled for 03/25. Pt verbalized understandings.

## 2021-03-17 NOTE — PERIOP NOTES
[address] , [address] [facility]   MAIN OR                                  [phone]   MAIN PRE OP                          [phone]                                                                                AMBULATORY PRE OP          [phone]  PRE-ADMISSION TESTING    [phone]   Surgery Date:  Thursday 3/25/21       Is surgery arrival time given by surgeon? YES  NO  If NO, [facility] staff will call you between 3 and 7pm the day before your surgery with your arrival time. (If your surgery is on a Monday, we will call you the Friday before.)    Call [phone] after 7pm Monday-Friday if you did not receive this call. INSTRUCTIONS BEFORE YOUR SURGERY   When You  Arrive Arrive at the [address] [facility] on the day of your surgery  Have your insurance card, photo ID, and any copayment (if needed)   Food   and   Drink NO food or drink after midnight the night before surgery    This means NO water, gum, mints, coffee, juice, etc.  No alcohol (beer, wine, liquor) 24 hours before and after surgery   Medications to   TAKE   Morning of Surgery MEDICATIONS TO TAKE THE MORNING OF SURGERY WITH A SIP OF WATER:   Klonopin  Inderal  Omeprazole    Check your blood sugar the morning of surgery if low you may take glucose tablets   Medications  To  STOP      7 days before surgery  Non-Steroidal anti-inflammatory Drugs (NSAID's): for example, Ibuprofen (Advil, Motrin), Naproxen (Aleve)   Aspirin, if taking for pain    Herbal supplements, vitamins, and fish oil   Other:Eliquis   (Pain medications not listed above, including Tylenol may be taken)   Blood  Thinners  If you take  Aspirin, Plavix, Coumadin, or any blood-thinning or anti-blood clot medicine, talk to the doctor who prescribed the medications for pre-operative instructions.    Bathing Clothing  Jewelry  Valuables      If you shower the morning of surgery, please do not apply anything to your skin (lotions, powders, deodorant, or makeup, especially mascara)   Follow Chlorhex[employer] body wash instructions provided to you during PAT appointment. Begin 3 days prior to surgery.  Do not shave or trim anywhere 24 hours before surgery   Wear your hair loose or down; no pony-tails, buns, or metal hair clips   Wear loose, comfortable, clean clothes   Wear glasses instead of contacts   Leave money, valuables, and jewelry, including body piercings, at home   Going Home - or Spending the Night  SAME-DAY SURGERY: You must have a responsible adult drive you home and stay with you 24 hours after surgery   ADMITS: If your doctor is keeping you in the hospital after surgery, leave personal belongings/luggage in your car until you have a hospital room number. Hospital discharge time is 12 noon  Drivers must be here before 12 noon unless you are told differently   Special Instructions It is now mandated that all surgical patients be tested for COVID-19 prior to surgery. Testing has to be exactly 4 days prior to surgery. Your COVID test date is [place] 3/21/21 between 8:00 am and 11:00 am.       COVID testing will be performed curbside at the [facility]  jay. There will be signs leading you to the testing site. You will need to bring a photo ID with you to be swabbed. Patients are advised to self-quarantine at home after testing and prior to your surgery date. You will be notified if your results are positive.     What to watch for:   Coronavirus (COVID-19) affects different people in different ways   It also appears with a wide range of symptoms from mild to severe   Signs usually appear 2-14 days after exposure     If you develop any of the following, notify your doctor immediately:  o Fever  o Chills, with or without a shiver  o Muscle pain  o Headache  o Sore throat  o Dry cough  o New loss of taste or smell  o Tiredness      If you develop any of the following, call 015:  o Shortness of breath  o Difficulty breathing  o Chest pain  o New confusion  o Blueness of fingers and/or lips     Follow all instructions so your surgery won’t be cancelled.  Please, be on time.                    If a situation occurs and you are delayed the day of surgery, call [phone]     If your physical condition changes (like a fever, cold, flu, etc.) call your surgeon.    Home medication(s) reviewed and verified via   PHONE     during PAT appointment.    The patient was contacted by  PHONE      The patient verbalizes understanding of all instructions and      DOES NOT   need reinforcement.

## 2021-03-17 NOTE — PERIOP NOTES
Pt states that his surgeon sent a med plan to Dr. Adilene Spencer and received clearance for surgery and told to stop Eliquis 3/22/21.

## 2021-03-21 ENCOUNTER — HOSPITAL ENCOUNTER (OUTPATIENT)
Dept: PREADMISSION TESTING | Age: 41
Discharge: HOME | End: 2021-03-21
Payer: COMMERCIAL

## 2021-03-21 PROCEDURE — U0005 INFEC AGEN DETEC AMPLI PROBE: HCPCS

## 2021-03-22 LAB — SARS-COV-2, COV2NT: NOT DETECTED

## 2021-03-24 ENCOUNTER — ANESTHESIA EVENT (OUTPATIENT)
Dept: SURGERY | Age: 41
End: 2021-03-24
Payer: COMMERCIAL

## 2021-03-25 ENCOUNTER — HOSPITAL ENCOUNTER (OUTPATIENT)
Age: 41
Setting detail: OUTPATIENT SURGERY
Discharge: HOME | End: 2021-03-25
Attending: SURGERY | Admitting: SURGERY
Payer: COMMERCIAL

## 2021-03-25 ENCOUNTER — ANESTHESIA (OUTPATIENT)
Dept: SURGERY | Age: 41
End: 2021-03-25
Payer: COMMERCIAL

## 2021-03-25 VITALS
OXYGEN SATURATION: 95 % | HEART RATE: 81 BPM | WEIGHT: 235 LBS | HEIGHT: 72 IN | DIASTOLIC BLOOD PRESSURE: 75 MMHG | BODY MASS INDEX: 31.83 KG/M2 | TEMPERATURE: 97.7 F | SYSTOLIC BLOOD PRESSURE: 134 MMHG | RESPIRATION RATE: 12 BRPM

## 2021-03-25 DIAGNOSIS — K40.91 RECURRENT INGUINAL HERNIA OF RIGHT SIDE WITHOUT OBSTRUCTION OR GANGRENE: ICD-10-CM

## 2021-03-25 DIAGNOSIS — Z98.890 S/P RIGHT INGUINAL HERNIORRHAPHY: Primary | ICD-10-CM

## 2021-03-25 DIAGNOSIS — D17.6 LIPOMA OF SPERMATIC CORD: ICD-10-CM

## 2021-03-25 DIAGNOSIS — Z87.19 S/P RIGHT INGUINAL HERNIORRHAPHY: Primary | ICD-10-CM

## 2021-03-25 LAB
ANION GAP SERPL CALC-SCNC: 8 MMOL/L (ref 5–15)
ATRIAL RATE: 74 BPM
BASOPHILS # BLD: 0.1 K/UL (ref 0–0.1)
BASOPHILS NFR BLD: 1 % (ref 0–1)
BUN SERPL-MCNC: 14 MG/DL (ref 6–20)
BUN/CREAT SERPL: 13 (ref 12–20)
CALCIUM SERPL-MCNC: 8.5 MG/DL (ref 8.5–10.1)
CALCULATED P AXIS, ECG09: 41 DEGREES
CALCULATED R AXIS, ECG10: 19 DEGREES
CALCULATED T AXIS, ECG11: 31 DEGREES
CHLORIDE SERPL-SCNC: 106 MMOL/L (ref 97–108)
CO2 SERPL-SCNC: 24 MMOL/L (ref 21–32)
CREAT SERPL-MCNC: 1.04 MG/DL (ref 0.7–1.3)
DIAGNOSIS, 93000: NORMAL
DIFFERENTIAL METHOD BLD: ABNORMAL
EOSINOPHIL # BLD: 0.1 K/UL (ref 0–0.4)
EOSINOPHIL NFR BLD: 2 % (ref 0–7)
ERYTHROCYTE [DISTWIDTH] IN BLOOD BY AUTOMATED COUNT: 12.1 % (ref 11.5–14.5)
GLUCOSE BLD STRIP.AUTO-MCNC: 201 MG/DL (ref 65–100)
GLUCOSE SERPL-MCNC: 202 MG/DL (ref 65–100)
HCT VFR BLD AUTO: 41.7 % (ref 36.6–50.3)
HGB BLD-MCNC: 15.1 G/DL (ref 12.1–17)
IMM GRANULOCYTES # BLD AUTO: 0.1 K/UL (ref 0–0.04)
IMM GRANULOCYTES NFR BLD AUTO: 1 % (ref 0–0.5)
LYMPHOCYTES # BLD: 2.6 K/UL (ref 0.8–3.5)
LYMPHOCYTES NFR BLD: 32 % (ref 12–49)
MCH RBC QN AUTO: 31.5 PG (ref 26–34)
MCHC RBC AUTO-ENTMCNC: 36.2 G/DL (ref 30–36.5)
MCV RBC AUTO: 86.9 FL (ref 80–99)
MONOCYTES # BLD: 0.6 K/UL (ref 0–1)
MONOCYTES NFR BLD: 7 % (ref 5–13)
NEUTS SEG # BLD: 4.7 K/UL (ref 1.8–8)
NEUTS SEG NFR BLD: 57 % (ref 32–75)
NRBC # BLD: 0 K/UL (ref 0–0.01)
NRBC BLD-RTO: 0 PER 100 WBC
P-R INTERVAL, ECG05: 152 MS
PLATELET # BLD AUTO: 233 K/UL (ref 150–400)
PMV BLD AUTO: 9.3 FL (ref 8.9–12.9)
POTASSIUM SERPL-SCNC: 3.9 MMOL/L (ref 3.5–5.1)
Q-T INTERVAL, ECG07: 390 MS
QRS DURATION, ECG06: 98 MS
QTC CALCULATION (BEZET), ECG08: 432 MS
RBC # BLD AUTO: 4.8 M/UL (ref 4.1–5.7)
SERVICE CMNT-IMP: ABNORMAL
SODIUM SERPL-SCNC: 138 MMOL/L (ref 136–145)
VENTRICULAR RATE, ECG03: 74 BPM
WBC # BLD AUTO: 8.3 K/UL (ref 4.1–11.1)

## 2021-03-25 PROCEDURE — 77030002996 HC SUT SLK J&J -A: Performed by: SURGERY

## 2021-03-25 PROCEDURE — 76060000033 HC ANESTHESIA 1 TO 1.5 HR: Performed by: SURGERY

## 2021-03-25 PROCEDURE — 77030038692 HC WND DEB SYS IRMX -B: Performed by: SURGERY

## 2021-03-25 PROCEDURE — 93005 ELECTROCARDIOGRAM TRACING: CPT

## 2021-03-25 PROCEDURE — 74011250636 HC RX REV CODE- 250/636: Performed by: ANESTHESIOLOGY

## 2021-03-25 PROCEDURE — 77030040922 HC BLNKT HYPOTHRM STRY -A

## 2021-03-25 PROCEDURE — 77030018836 HC SOL IRR NACL ICUM -A: Performed by: SURGERY

## 2021-03-25 PROCEDURE — 82962 GLUCOSE BLOOD TEST: CPT

## 2021-03-25 PROCEDURE — 80048 BASIC METABOLIC PNL TOTAL CA: CPT

## 2021-03-25 PROCEDURE — 74011000250 HC RX REV CODE- 250: Performed by: NURSE ANESTHETIST, CERTIFIED REGISTERED

## 2021-03-25 PROCEDURE — 74011250636 HC RX REV CODE- 250/636: Performed by: NURSE ANESTHETIST, CERTIFIED REGISTERED

## 2021-03-25 PROCEDURE — C1781 MESH (IMPLANTABLE): HCPCS | Performed by: SURGERY

## 2021-03-25 PROCEDURE — 76210000020 HC REC RM PH II FIRST 0.5 HR: Performed by: SURGERY

## 2021-03-25 PROCEDURE — 77030040503 HC DRN WND PENRS MDII -A: Performed by: SURGERY

## 2021-03-25 PROCEDURE — 74011250636 HC RX REV CODE- 250/636: Performed by: SURGERY

## 2021-03-25 PROCEDURE — 76210000006 HC OR PH I REC 0.5 TO 1 HR: Performed by: SURGERY

## 2021-03-25 PROCEDURE — 74011000250 HC RX REV CODE- 250: Performed by: SURGERY

## 2021-03-25 PROCEDURE — 77030020143 HC AIRWY LARYN INTUB CGAS -A: Performed by: ANESTHESIOLOGY

## 2021-03-25 PROCEDURE — 2709999900 HC NON-CHARGEABLE SUPPLY: Performed by: SURGERY

## 2021-03-25 PROCEDURE — 88304 TISSUE EXAM BY PATHOLOGIST: CPT

## 2021-03-25 PROCEDURE — 77030002933 HC SUT MCRYL J&J -A: Performed by: SURGERY

## 2021-03-25 PROCEDURE — 76010000149 HC OR TIME 1 TO 1.5 HR: Performed by: SURGERY

## 2021-03-25 PROCEDURE — 49520 REREPAIR ING HERNIA REDUCE: CPT | Performed by: SURGERY

## 2021-03-25 PROCEDURE — 77030031139 HC SUT VCRL2 J&J -A: Performed by: SURGERY

## 2021-03-25 PROCEDURE — 36415 COLL VENOUS BLD VENIPUNCTURE: CPT

## 2021-03-25 PROCEDURE — 77030040361 HC SLV COMPR DVT MDII -B

## 2021-03-25 PROCEDURE — 85025 COMPLETE CBC W/AUTO DIFF WBC: CPT

## 2021-03-25 DEVICE — BARD SOFT MESH, 3" X 6" (7.5 CM X 15 CM)
Type: IMPLANTABLE DEVICE | Site: INGUINAL | Status: FUNCTIONAL
Brand: BARD

## 2021-03-25 RX ORDER — FAMOTIDINE 10 MG/ML
INJECTION INTRAVENOUS AS NEEDED
Status: DISCONTINUED | OUTPATIENT
Start: 2021-03-25 | End: 2021-03-25 | Stop reason: HOSPADM

## 2021-03-25 RX ORDER — LIDOCAINE HYDROCHLORIDE 20 MG/ML
INJECTION, SOLUTION EPIDURAL; INFILTRATION; INTRACAUDAL; PERINEURAL AS NEEDED
Status: DISCONTINUED | OUTPATIENT
Start: 2021-03-25 | End: 2021-03-25 | Stop reason: HOSPADM

## 2021-03-25 RX ORDER — NALOXONE HYDROCHLORIDE 0.4 MG/ML
0.2 INJECTION, SOLUTION INTRAMUSCULAR; INTRAVENOUS; SUBCUTANEOUS
Status: DISCONTINUED | OUTPATIENT
Start: 2021-03-25 | End: 2021-03-25 | Stop reason: HOSPADM

## 2021-03-25 RX ORDER — HYDROMORPHONE HYDROCHLORIDE 1 MG/ML
.25-1 INJECTION, SOLUTION INTRAMUSCULAR; INTRAVENOUS; SUBCUTANEOUS
Status: DISCONTINUED | OUTPATIENT
Start: 2021-03-25 | End: 2021-03-25 | Stop reason: HOSPADM

## 2021-03-25 RX ORDER — FENTANYL CITRATE 50 UG/ML
INJECTION, SOLUTION INTRAMUSCULAR; INTRAVENOUS AS NEEDED
Status: DISCONTINUED | OUTPATIENT
Start: 2021-03-25 | End: 2021-03-25 | Stop reason: HOSPADM

## 2021-03-25 RX ORDER — SODIUM CHLORIDE, SODIUM LACTATE, POTASSIUM CHLORIDE, CALCIUM CHLORIDE 600; 310; 30; 20 MG/100ML; MG/100ML; MG/100ML; MG/100ML
125 INJECTION, SOLUTION INTRAVENOUS CONTINUOUS
Status: DISCONTINUED | OUTPATIENT
Start: 2021-03-25 | End: 2021-03-25 | Stop reason: HOSPADM

## 2021-03-25 RX ORDER — FLUMAZENIL 0.1 MG/ML
0.2 INJECTION INTRAVENOUS
Status: DISCONTINUED | OUTPATIENT
Start: 2021-03-25 | End: 2021-03-25 | Stop reason: HOSPADM

## 2021-03-25 RX ORDER — EPHEDRINE SULFATE/0.9% NACL/PF 50 MG/5 ML
SYRINGE (ML) INTRAVENOUS AS NEEDED
Status: DISCONTINUED | OUTPATIENT
Start: 2021-03-25 | End: 2021-03-25 | Stop reason: HOSPADM

## 2021-03-25 RX ORDER — HYDROCODONE BITARTRATE AND ACETAMINOPHEN 5; 325 MG/1; MG/1
1 TABLET ORAL
Qty: 20 TAB | Refills: 0 | Status: SHIPPED | OUTPATIENT
Start: 2021-03-25 | End: 2021-03-28

## 2021-03-25 RX ORDER — LIDOCAINE HYDROCHLORIDE 10 MG/ML
0.1 INJECTION, SOLUTION EPIDURAL; INFILTRATION; INTRACAUDAL; PERINEURAL AS NEEDED
Status: DISCONTINUED | OUTPATIENT
Start: 2021-03-25 | End: 2021-03-25 | Stop reason: HOSPADM

## 2021-03-25 RX ORDER — DIPHENHYDRAMINE HYDROCHLORIDE 50 MG/ML
12.5 INJECTION, SOLUTION INTRAMUSCULAR; INTRAVENOUS AS NEEDED
Status: DISCONTINUED | OUTPATIENT
Start: 2021-03-25 | End: 2021-03-25 | Stop reason: HOSPADM

## 2021-03-25 RX ORDER — ONDANSETRON 2 MG/ML
INJECTION INTRAMUSCULAR; INTRAVENOUS AS NEEDED
Status: DISCONTINUED | OUTPATIENT
Start: 2021-03-25 | End: 2021-03-25 | Stop reason: HOSPADM

## 2021-03-25 RX ORDER — BUPIVACAINE HYDROCHLORIDE 5 MG/ML
30 INJECTION, SOLUTION EPIDURAL; INTRACAUDAL ONCE
Status: COMPLETED | OUTPATIENT
Start: 2021-03-25 | End: 2021-03-25

## 2021-03-25 RX ORDER — HYDROMORPHONE HYDROCHLORIDE 2 MG/ML
INJECTION, SOLUTION INTRAMUSCULAR; INTRAVENOUS; SUBCUTANEOUS AS NEEDED
Status: DISCONTINUED | OUTPATIENT
Start: 2021-03-25 | End: 2021-03-25 | Stop reason: HOSPADM

## 2021-03-25 RX ORDER — PROPOFOL 10 MG/ML
INJECTION, EMULSION INTRAVENOUS
Status: DISCONTINUED | OUTPATIENT
Start: 2021-03-25 | End: 2021-03-25 | Stop reason: HOSPADM

## 2021-03-25 RX ORDER — KETOROLAC TROMETHAMINE 30 MG/ML
INJECTION, SOLUTION INTRAMUSCULAR; INTRAVENOUS AS NEEDED
Status: DISCONTINUED | OUTPATIENT
Start: 2021-03-25 | End: 2021-03-25 | Stop reason: HOSPADM

## 2021-03-25 RX ORDER — PROPOFOL 10 MG/ML
INJECTION, EMULSION INTRAVENOUS AS NEEDED
Status: DISCONTINUED | OUTPATIENT
Start: 2021-03-25 | End: 2021-03-25 | Stop reason: HOSPADM

## 2021-03-25 RX ORDER — NALOXONE HYDROCHLORIDE 4 MG/.1ML
SPRAY NASAL
Qty: 2 EACH | Refills: 2 | Status: SHIPPED | OUTPATIENT
Start: 2021-03-25

## 2021-03-25 RX ORDER — MIDAZOLAM HYDROCHLORIDE 1 MG/ML
INJECTION, SOLUTION INTRAMUSCULAR; INTRAVENOUS AS NEEDED
Status: DISCONTINUED | OUTPATIENT
Start: 2021-03-25 | End: 2021-03-25 | Stop reason: HOSPADM

## 2021-03-25 RX ADMIN — HYDROMORPHONE HYDROCHLORIDE 0.5 MG: 2 INJECTION INTRAMUSCULAR; INTRAVENOUS; SUBCUTANEOUS at 08:23

## 2021-03-25 RX ADMIN — FAMOTIDINE 20 MG: 10 INJECTION INTRAVENOUS at 07:53

## 2021-03-25 RX ADMIN — MIDAZOLAM HYDROCHLORIDE 2 MG: 2 INJECTION, SOLUTION INTRAMUSCULAR; INTRAVENOUS at 07:51

## 2021-03-25 RX ADMIN — CEFAZOLIN SODIUM 2 G: 1 POWDER, FOR SOLUTION INTRAMUSCULAR; INTRAVENOUS at 08:07

## 2021-03-25 RX ADMIN — HYDROMORPHONE HYDROCHLORIDE 0.5 MG: 1 INJECTION, SOLUTION INTRAMUSCULAR; INTRAVENOUS; SUBCUTANEOUS at 09:49

## 2021-03-25 RX ADMIN — Medication 10 MG: at 08:31

## 2021-03-25 RX ADMIN — KETOROLAC TROMETHAMINE 30 MG: 30 INJECTION INTRAMUSCULAR; INTRAVENOUS at 09:00

## 2021-03-25 RX ADMIN — FENTANYL CITRATE 50 MCG: 0.05 INJECTION, SOLUTION INTRAMUSCULAR; INTRAVENOUS at 08:07

## 2021-03-25 RX ADMIN — SODIUM CHLORIDE, POTASSIUM CHLORIDE, SODIUM LACTATE AND CALCIUM CHLORIDE 125 ML/HR: 600; 310; 30; 20 INJECTION, SOLUTION INTRAVENOUS at 07:19

## 2021-03-25 RX ADMIN — SODIUM CHLORIDE, POTASSIUM CHLORIDE, SODIUM LACTATE AND CALCIUM CHLORIDE: 600; 310; 30; 20 INJECTION, SOLUTION INTRAVENOUS at 07:51

## 2021-03-25 RX ADMIN — LIDOCAINE HYDROCHLORIDE 100 MG: 20 INJECTION, SOLUTION EPIDURAL; INFILTRATION; INTRACAUDAL; PERINEURAL at 07:59

## 2021-03-25 RX ADMIN — ONDANSETRON HYDROCHLORIDE 4 MG: 2 SOLUTION INTRAMUSCULAR; INTRAVENOUS at 07:53

## 2021-03-25 RX ADMIN — PROPOFOL 300 MG: 10 INJECTION, EMULSION INTRAVENOUS at 07:59

## 2021-03-25 RX ADMIN — PROPOFOL 50 MCG/KG/MIN: 10 INJECTION, EMULSION INTRAVENOUS at 08:07

## 2021-03-25 RX ADMIN — PROPOFOL 50 MG: 10 INJECTION, EMULSION INTRAVENOUS at 08:14

## 2021-03-25 RX ADMIN — FENTANYL CITRATE 50 MCG: 0.05 INJECTION, SOLUTION INTRAMUSCULAR; INTRAVENOUS at 07:51

## 2021-03-25 NOTE — INTERVAL H&P NOTE
Update History & Physical    The Patient's History and Physical of March 12,   The plan was reviewed with the patient and I examined the patient. There was no change. The surgical site was confirmed by the patient and me. Plan:  The risk, benefits, expected outcome, and alternative to the recommended procedure have been discussed with the patient. Patient understands and wants to proceed with the procedure.     Electronically signed by Chris Figueroa MD on 3/25/2021 at 7:42 AM

## 2021-03-25 NOTE — ANESTHESIA POSTPROCEDURE EVALUATION
Procedure(s):  RIGHT INGUINAL HERNIORRHAPHY WITH MESH (GEN W/ LMA). general    Anesthesia Post Evaluation      Multimodal analgesia: multimodal analgesia not used between 6 hours prior to anesthesia start to PACU discharge  Patient location during evaluation: PACU  Patient participation: complete - patient participated  Level of consciousness: awake and alert and sleepy but conscious  Pain score: 0  Pain management: adequate  Airway patency: patent  Anesthetic complications: no  Cardiovascular status: hemodynamically stable and acceptable  Respiratory status: acceptable  Hydration status: acceptable  Comments: Patient seen and evaluated; no concerns. Post anesthesia nausea and vomiting:  none      INITIAL Post-op Vital signs:   Vitals Value Taken Time   /75 03/25/21 1010   Temp 36.5 °C (97.7 °F) 03/25/21 0955   Pulse 85 03/25/21 1013   Resp 14 03/25/21 1013   SpO2 93 % 03/25/21 1013   Vitals shown include unvalidated device data.

## 2021-03-25 NOTE — DISCHARGE INSTRUCTIONS
Patient Education        Hernia Repair: What to Expect at [address] are likely to have pain for the next few days. You may also feel like you have the flu, and you may have a low fever and feel tired and sick to your stomach. This is common. You should feel better after a few days and will probably feel much better in 7 days. For several weeks you may feel twinges or pulling in the hernia repair when you move. You may have some bruising on the scrotum and along the penis. This is normal.  To support your scrotum, you will need to wear a jockstrap or briefs, not boxers, for several days after a groin (inguinal) hernia repair. MD2Udex bicycle shorts may provide good support. This care sheet gives you a general idea about how long it will take for you to recover, but each person recovers at a different pace. Follow the steps below to get better as quickly as possible. How can you care for yourself at home? Activity    · Rest when you feel tired. Getting enough sleep will help you recover.     · Try to walk each day. Start by walking a little more than you did the day before. Bit by bit, increase the amount you walk. Walking boosts blood flow and helps prevent pneumonia and constipation.     · Avoid strenuous activities, such as biking, jogging, weight lifting, or aerobic exercise, until your doctor says it is okay.     · Avoid lifting anything over 30 pounds or that would make you strain for 6 weeks! This may include heavy grocery bags and milk containers, a heavy briefcase or backpack, cat litter or dog food bags, a vacuum , or a child.     · You may drive after 3 days and when you are no longer taking narcotic pain medicine and can quickly move your foot from the gas pedal to the brake!   You must also be able to sit comfortably for a long period of time, even if you do not plan to go far because you might get caught in traffic.     · Most people are able to return to work within 1 to 2 weeks after surgery.     · You may shower 24 hours after surgery. Pat the cut (incision) dry. Do not take a bath for the first 2 weeks, and until after seen by your doctor.     ·    Diet    · You can eat your normal diet. If your stomach is upset, try bland, low-fat foods like plain rice, broiled chicken, toast, and yogurt.     · Drink plenty of fluids (unless your doctor tells you not to).     · You may notice that your bowel movements are not regular right after your surgery. This is common. Avoid constipation and straining with bowel movements. You may want to take a fiber supplement every day. If you have not had a bowel movement by Friday, 3/26, then take Miralax, Milk of Magnesia, prune juice, or other laxative or enema until your bowel movements are normal!!! Medicines    · Youu can restart your medicines. Your doctor will also give you instructions about taking any new medicines.     · Resume your Eliquis on Friday, 3/26 if there are no signs of bleeding!!!     · Be safe with medicines. Take pain medicines exactly as directed. ? If the doctor gave you a prescription medicine for pain, take it as prescribed. ? If you are not taking a prescription pain medicine, take an over-the-counter medicine such as acetaminophen (Tylenol), ibuprofen (Advil, Motrin), or naproxen (Aleve). Read and follow all instructions on the label. ? Do not take two or more pain medicines at the same time unless the doctor told you to. Many pain medicines have acetaminophen, which is Tylenol. Too much acetaminophen (Tylenol) can be harmful.     · If your doctor prescribed antibiotics, take them as directed. Do not stop taking them just because you feel better. You need to take the full course of antibiotics.     · If you think your pain medicine is making you sick to your stomach:  ? Take your medicine after meals (unless your doctor has told you not to). ? Ask your doctor for a different pain medicine.    Incision care    · Remove your bandage on Saturday, 3/27. Leave the incision uncovered. · You have strips of tape on the cut (incision) the doctor made. Leave the tape on for a week and then remove it on Thursday, 4/1!     · Keep the incision clean and dry. · If there is any drainage, then cover the incision with a dry bandage.     · Wash the area daily with warm, soapy water and pat it dry. · You may apply an ice pack to your groin for the first 48 hours. Every hour for 10 minutes. Wrap the ice pack in a towel. Do not apply directly to your skin! Follow-up care is a key part of your treatment and safety. Be sure to make and go to all appointments, and call your doctor if you are having problems. It's also a good idea to know your test results and keep a list of the medicines you take. When should you call for help? Call 911 anytime you think you may need emergency care. For example, call if:    · You passed out (lost consciousness).     · You are short of breath. Call your doctor now or seek immediate medical care if:    · You have abdominal or groin pain that does not get better after you take pain medicine.     · You are sick to your stomach and cannot keep fluids down.     · You have signs of a blood clot in your leg (called a deep vein thrombosis), such as:  ? Pain in your calf, back of the knee, thigh, or groin. ? Redness and swelling in your leg or groin.     · You cannot pass stool or gas. · IF YOU CANNOT URINATE, THEN GO TO THE EMERGENCY ROOM!     · Bright red blood has soaked through the bandage over your incision.     · You have loose stitches, or your incision comes open.     · You have signs of infection, such as:  ? Increased pain, swelling, warmth, or redness. ? Red streaks leading from the incision. ? Pus draining from the incision. ? A fever > 101. Watch closely for any changes in your health, and be sure to contact your doctor if you have any problems. Where can you learn more?   Go to http://www.gray.com/  Enter J313 in the search box to learn more about \"Hernia Repair: What to Expect at Home. \"  Current as of: April 15, 2020               Content Version: 12.6  © 2006-2020 [employer]. Care instructions adapted under license by [employer] (which disclaims liability or warranty for this information). If you have questions about a medical condition or this instruction, always ask your healthcare professional. [facility] any warranty or liability for your use of this information. DISCHARGE SUMMARY from your Nurse      PATIENT INSTRUCTIONS    After general anesthesia or intravenous sedation, for 24 hours or while taking prescription Narcotics:  · Limit your activities  · Do not drive and operate hazardous machinery  · Do not make important personal or business decisions  · Do  not drink alcoholic beverages  · If you have not urinated within 8 hours after discharge, please contact your surgeon on call. Report the following to your surgeon:  · Excessive pain, swelling, redness or odor of or around the surgical area  · Temperature over 100.5  · Nausea and vomiting lasting longer than 4 hours or if unable to take medications  · Any signs of decreased circulation or nerve impairment to extremity: change in color, persistent  numbness, tingling, coldness or increase pain  · Any questions      GOOD HELP TO FIGHT AN INFECTION  Here are a few tip to help reduce the chance of getting an infection after surgery:   Wash Your Hands   Good handwashing is the most important thing you and your caregiver can do.  Wash before and after caring for any wounds. Dry your hand with a clean towel.  Wash with soap and water for at least 20 seconds. A TIP: sing the \"Happy Birthday\" song through one time while washing to help with the timing.  Use a hand  in between washings.      Shower   When your surgeon says it is OK to take a shower, use a new bar of antibacterial soap (if that is what you use, and keep that bar of soap ONLY for your use), or antibacterial body wash.  Use a clean wash cloth or sponge when you bathe.  Dry off with a clean towel  after every bath - be careful around any wounds, skin staples, sutures or surgical glue over/on wounds.  Do not enter swimming pools, hot tubs, lakes, rivers and/or ocean until wounds are healed and your doctor/surgeon says it is OK.  Use Clean Sheets   Sleep on freshly laundered sheets after your surgery.  Keep the surgery site covered with a clean, dry bandage (if instructed to do so). If the bandage becomes soiled, reapply a new, dry, clean bandage.  Do not allow pets to sleep with you while your wound is healing.  Lifestyle Modification and Controlling Your Blood Sugar   Smoking slows wound healing. Stop smoking and limit exposure to second-hand smoke.  High blood sugar slows wound healing. Eat a well-balanced diet to provide proper nutrition while healing   Monitor your blood sugar (if you are a diabetic) and take your medications as you are suppose to so you can control you blood sugar after surgery. COUGH AND DEEP BREATHE    Breathing deeply and coughing are very important exercises to do after surgery. Deep breathing and coughing open the little air tubes and air sacks in your lungs. You take deep breaths every day. You may not even notice - it is just something you do when you sigh or yawn. It is a natural exercise you do to keep these air passages open. After surgery, take deep breaths and cough, on purpose. DIRECTIONS:  · Take 10 to 15 slow deep breaths every hour while awake. · Breathe in deeply, and hold it for 2 seconds. · Exhale slowly through puckered lips, like blowing up a balloon. · After every 4th or 5th deep breath, hug your pillow to your chest or belly and give a hard, deep cough. Yes, it will probably hurt. But doing this exercise is a very important part of healing after surgery. Take your pain medicine to help you do this exercise without too much pain. Coughing and deep breathing help prevent bronchitis and pneumonia after surgery. If you had chest or belly surgery, use a pillow as a \"hug daisy\" and hold it tightly to your chest or belly when you cough. ANKLE PUMPS    Ankle pumps increase the circulation of oxygenated blood to your lower extremities and decrease your risk for circulation problems such as blood clots. They also stretch the muscles, tendons and ligaments in your foot and ankle, and prevent joint contracture in the ankle and foot, especially after surgeries on the legs. It is important to do ankle pump exercises regularly after surgery because immobility increases your risk for developing a blood clot. Your doctor may also have you take an Aspirin for the next few days as well. If your doctor did not ask you to take an Aspirin, consult with him before starting Aspirin therapy on your own. The exercise is quite simple. · Slowly point your foot forward, feeling the muscles on the top of your lower leg stretch, and hold this position for 5 seconds. · Next, pull your foot back toward you as far as possible, stretching the calf muscles, and hold that position for 5 seconds. · Repeat with the other foot. · Perform 10 repetitions every hour while awake for both ankles if possible (down and then up with the foot once is one repetition). You should feel gentle stretching of the muscles in your lower leg when doing this exercise. If you feel pain, or your range of motion is limited, don't push too hard. Only go the limit your joint and muscles will let you go. If you have increasing pain, progressively worsening leg warmth or swelling, STOP the exercise and call your doctor.            MEDICATION AND   SIDE EFFECT GUIDE    The [facility] MEDICATION AND SIDE EFFECT GUIDE was provided to the PATIENT AND CARE PROVIDER. Information provided includes instruction about drug purpose and common side effects for the following medications:   · ***        These are general instructions for a healthy lifestyle:    *   Please give a list of your current medications to your Primary Care Provider. *   Please update this list whenever your medications are discontinued, doses are changed, or new medications (including over-the-counter products) are added. *   Please carry medication information at all times in case of emergency situations. About Smoking  No smoking / No tobacco products  Avoid exposure to second hand smoke     Surgeon General's Warning:  Quitting smoking now greatly reduces serious risk to your health. Obesity, smoking, and sedentary lifestyle greatly increases your risk for illness and disease. A healthy diet, regular physical exercise & weight monitoring are important for maintaining a healthy lifestyle. Congestive Heart Failure  You may be retaining fluid if you have a history of heart failure or if you experience any of the following symptoms:  Weight gain of 3 pounds or more overnight or 5 pounds in a week, increased swelling in your hands or feet or shortness of breath while lying flat in bed. Please call your doctor as soon as you notice any of these symptoms; do not wait until your next office visit. Recognize signs and symptoms of STROKE:  F -  Face looks uneven  A -  Arms unable to move or move evenly  S -  Speech slurred or non-existent  T -  Time-call 911 as soon as signs and symptoms begin-DO NOT go          back to bed or wait to see if you get better-TIME IS BRAIN. Warning Signs of HEART ATTACK   Call 911 if you have these symptoms:     Chest discomfort. Most heart attacks involve discomfort in the center of the chest that lasts more than a few minutes, or that goes away and comes back.  It can feel like uncomfortable pressure, squeezing, fullness, or pain.  Discomfort in other areas of the upper body. Symptoms can include pain or discomfort in one or both arms, the back, neck, jaw, or stomach.  Shortness of breath with or without chest discomfort.  Other signs may include breaking out in a cold sweat, nausea, or lightheadedness. Don't wait more than five minutes to call 911 - MINUTES MATTER! Fast action can save your life. Calling 911 is almost always the fastest way to get lifesaving treatment. Emergency Medical Services staff can begin treatment when they arrive -- up to an hour sooner than if someone gets to the hospital by car. Learning About Coronavirus [phone])  Coronavirus [phone]): Overview  What is coronavirus (COVID-19)? The coronavirus disease (COVID-19) is caused by a virus. It is an illness that was first found in [place], [place], in December 2019. It has since spread worldwide. The virus can cause fever, cough, and trouble breathing. In severe cases, it can cause pneumonia and make it hard to breathe without help. It can cause death. Coronaviruses are a large group of viruses. They cause the common cold. They also cause more serious illnesses like Middle East respiratory syndrome (MERS) and severe acute respiratory syndrome (SARS). COVID-19 is caused by a novel coronavirus. That means it's a new type that has not been seen in people before. This virus spreads person-to-person through droplets from coughing and sneezing. It can also spread when you are close to someone who is infected. And it can spread when you touch something that has the virus on it, such as a doorknob or a tabletop. What can you do to protect yourself from coronavirus (COVID-19)? The best way to protect yourself from getting sick is to:  · Avoid areas where there is an outbreak. · Avoid contact with people who may be infected. · Wash your hands often with soap or alcohol-based hand sanitizers.   · Avoid crowds and try to stay at least 6 feet away from other people. · Wash your hands often, especially after you cough or sneeze. Use soap and water, and scrub for at least 20 seconds. If soap and water aren't available, use an alcohol-based hand . · Avoid touching your mouth, nose, and eyes. What can you do to avoid spreading the virus to others? To help avoid spreading the virus to others:  · Cover your mouth with a tissue when you cough or sneeze. Then throw the tissue in the trash. · Use a disinfectant to clean things that you touch often. · Stay home if you are sick or have been exposed to the virus. Don't go to school, work, or public areas. And don't use public transportation. · If you are sick:  ? Leave your home only if you need to get medical care. But call the doctor's office first so they know you're coming. And wear a face mask, if you have one.  ? If you have a face mask, wear it whenever you're around other people. It can help stop the spread of the virus when you cough or sneeze. ? Clean and disinfect your home every day. Use household  and disinfectant wipes or sprays. Take special care to clean things that you grab with your hands. These include doorknobs, remote controls, phones, and handles on your refrigerator and microwave. And don't forget countertops, tabletops, bathrooms, and computer keyboards. When to call for help  Call 911 anytime you think you may need emergency care. For example, call if:  · You have severe trouble breathing. (You can't talk at all.)  · You have constant chest pain or pressure. · You are severely dizzy or lightheaded. · You are confused or can't think clearly. · Your face and lips have a blue color. · You pass out (lose consciousness) or are very hard to wake up. Call your doctor now if you develop symptoms such as:  · Shortness of breath. · Fever. · Cough.   If you need to get care, call ahead to the doctor's office for instructions before you go. Make sure you wear a face mask, if you have one, to prevent exposing other people to the virus. Where can you get the latest information? The following health organizations are tracking and studying this virus. Their websites contain the most up-to-date information. Paulita Dakin also learn what to do if you think you may have been exposed to the virus. · U.S. Centers for Disease Control and Prevention (CDC): The CDC provides updated news about the disease and travel advice. The website also tells you how to prevent the spread of infection. www.cdc.gov  · World Health Organization [facility]): WHO offers information about the virus outbreaks. WHO also has travel advice. www.who.int  Current as of: April 1, 2020               Content Version: 12.4  © 2006-2020 [employer]. Care instructions adapted under license by your healthcare professional. If you have questions about a medical condition or this instruction, always ask your healthcare professional. [address] any warranty or liability for your use of this information. The discharge information has been reviewed with the {PATIENT PARENT GUARDIAN:60904}. Any questions and concerns from the {PATIENT PARENT GUARDIAN:61645} have been addressed. The {PATIENT PARENT GUARDIAN:58403} verbalized understanding.         CONTENTS FOUND IN YOUR DISCHARGE ENVELOPE:  [x]     Surgeon and Hospital Discharge Instructions  [x]     [facility] Care Provider Card  [x]     Medication & Side Effect Guide            (your newly prescribed medications have been marked/highlighted showing the most common side effects from   the classes of drugs on your prescriptions)  []     Medication Prescription(s) x *** ( [] These have been sent electronically to your pharmacy by your surgeon,   - OR -       your surgeon has already provided these to you during a previous/pre-op office visit)  []     [address]  []     Physical Therapy Prescription  []     Follow-up Appointment Cards  []     Surgery-related Pictures/Media  []     Pain block and/or block with On-Q Catheter from Anesthesia Service (information included in your instructions above)  []     Medical device information sheets/pamphlets from their    []     School/work excuse note. []     /parent work excuse note. The following personal items collected during your admission are returned to you:   Dental Appliance: Dental Appliances: None  Vision: Visual Aid: None  Hearing Aid:    Jewelry: Jewelry: None  Clothing: Clothing: Other (comment)(street cltohing placed in belongings bag)  Other Valuables: Other Valuables: None  Valuables sent to safe:           Roberta Ventura.  Alina Kilpatrick MD, [address] [address], [facility] at [address] [facility] [address] [address] [address].  [phone]  Fax [phone]

## 2021-03-26 ENCOUNTER — TELEPHONE (OUTPATIENT)
Dept: SURGERY | Age: 41
End: 2021-03-26

## 2021-03-26 NOTE — TELEPHONE ENCOUNTER
Pt returned the call verified using 2 pt identifiers. How are you doing:Doing well having some soreness    Are you having any pain: Yes ______           No ______  If yes level:3/10    Are you taking pain meds:No Only taking OTC Tylenol as needed       If yes- recommended any OTC constipation treatment if needed    Have you had any nausea or vomiting: Yes _______           No _______    How is your appetite (eating & drinking):    Have you moved your bowels since surgery: Yes ___x___       No ______    Any issues with urination: Yes _____        No ___x___    Pt notified to take dressing off after 48 hrs: Yes __x_____        No ____ Pt is also aware to remove the steri strips in 7 days. Do they have a drain:  Yes ______        No ___x___    Are they keeping track of output: Yes ______        No ______    Did they review their discharge instructions:  Yes ___x___         No ______    Any other concerns:None- informed to call the office back with any questions or concerns.     Your follow up office appt is:  04/07 1:45 pm

## 2021-04-07 ENCOUNTER — OFFICE VISIT (OUTPATIENT)
Dept: SURGERY | Age: 41
End: 2021-04-07
Payer: COMMERCIAL

## 2021-04-07 VITALS
DIASTOLIC BLOOD PRESSURE: 72 MMHG | HEART RATE: 89 BPM | WEIGHT: 236.5 LBS | TEMPERATURE: 98.6 F | RESPIRATION RATE: 20 BRPM | HEIGHT: 72 IN | SYSTOLIC BLOOD PRESSURE: 120 MMHG | OXYGEN SATURATION: 97 % | BODY MASS INDEX: 32.03 KG/M2

## 2021-04-07 DIAGNOSIS — Z98.890 S/P RIGHT INGUINAL HERNIORRHAPHY: ICD-10-CM

## 2021-04-07 DIAGNOSIS — Z87.19 S/P RIGHT INGUINAL HERNIORRHAPHY: ICD-10-CM

## 2021-04-07 PROCEDURE — 99024 POSTOP FOLLOW-UP VISIT: CPT | Performed by: SURGERY

## 2021-04-07 NOTE — LETTER
NOTIFICATION OF RETURN TO WORK / SCHOOL 
 
4/7/2021 2:29 PM 
 
Mr. Martin Palomo [facility] [id number] [place] To Whom It May Concern: 
 
Martin Palomo was under the care of [address] [place] [address]. He will be able to return to work/school on 04/13/2021. If there are questions or concerns please have the patient contact our office.  
 
Sincerely, 
 
 
Daria Kaur MD

## 2021-04-07 NOTE — PROGRESS NOTES
Subjective:      Talia Bermudez is a 41 y.o. white male presents for postop care 2 weeks following a hernia repair. Mr. Ivonne Quinn is doing great. His appetite is good, and he is eating a regular diet without difficulty. His bowel movements are regular. He is voiding without difficulty. He is not having any pain on either side or problems with his incision. Objective:     Visit Vitals  /72 (BP 1 Location: Left upper arm, BP Patient Position: Sitting, BP Cuff Size: Adult)   Pulse 89   Temp 98.6 °F (37 °C) (Oral)   Resp 20   Ht 6' (1.829 m)   Wt 236 lb 8 oz (107.3 kg)   SpO2 97%   BMI 32.08 kg/m²       General:  alert, cooperative, no distress   Right groin:   The incision is clean, dry, and intact with no erythema. There is no hernia. Assessment:     1st POV, s/p right inguinal herniorrhaphy with mesh. Plan:     Mr. Ivonne Quinn is doing well so far. He will continue with light activity for another 4 weeks and then gradually resume normal activity. He can f/u with me prn.

## 2021-04-07 NOTE — PROGRESS NOTES
1. Have you been to the ER, urgent care clinic since your last visit? Hospitalized since your last visit? No    2. Have you seen or consulted any other health care providers outside of the [address] [place] [address] since your last visit? Include any pap smears or colon screening.  No

## 2022-03-18 PROBLEM — Z87.19 S/P RIGHT INGUINAL HERNIORRHAPHY: Status: ACTIVE | Noted: 2021-04-07

## 2022-03-18 PROBLEM — Z98.890 S/P RIGHT INGUINAL HERNIORRHAPHY: Status: ACTIVE | Noted: 2021-04-07

## 2022-03-18 PROBLEM — D17.6 LIPOMA OF SPERMATIC CORD: Status: ACTIVE | Noted: 2021-03-25

## 2022-03-19 PROBLEM — E78.00 HYPERCHOLESTEREMIA: Status: ACTIVE | Noted: 2021-03-12

## 2022-03-19 PROBLEM — E11.9 TYPE II DIABETES MELLITUS (HCC): Status: ACTIVE | Noted: 2021-03-12

## 2022-03-19 PROBLEM — Z98.890 S/P ABLATION OF ATRIAL FIBRILLATION: Status: ACTIVE | Noted: 2018-08-03

## 2022-03-19 PROBLEM — Z86.79 S/P ABLATION OF ATRIAL FIBRILLATION: Status: ACTIVE | Noted: 2018-08-03

## 2022-03-19 PROBLEM — I49.3 SYMPTOMATIC PVCS: Status: ACTIVE | Noted: 2017-06-24

## 2022-03-20 PROBLEM — K21.9 GERD (GASTROESOPHAGEAL REFLUX DISEASE): Status: ACTIVE | Noted: 2021-03-12

## 2022-09-19 ENCOUNTER — APPOINTMENT (OUTPATIENT)
Dept: GENERAL RADIOLOGY | Age: 42
End: 2022-09-19
Attending: NURSE PRACTITIONER
Payer: COMMERCIAL

## 2022-09-19 ENCOUNTER — HOSPITAL ENCOUNTER (EMERGENCY)
Age: 42
Discharge: STILL ADMITTED OR ADMINISTRATIVE | End: 2022-09-19

## 2022-09-19 ENCOUNTER — HOSPITAL ENCOUNTER (EMERGENCY)
Age: 42
Discharge: HOME | End: 2022-09-19
Attending: EMERGENCY MEDICINE
Payer: COMMERCIAL

## 2022-09-19 VITALS
TEMPERATURE: 99 F | SYSTOLIC BLOOD PRESSURE: 141 MMHG | OXYGEN SATURATION: 100 % | HEART RATE: 88 BPM | DIASTOLIC BLOOD PRESSURE: 95 MMHG | WEIGHT: 235 LBS | RESPIRATION RATE: 20 BRPM | BODY MASS INDEX: 30.16 KG/M2 | HEIGHT: 74 IN

## 2022-09-19 DIAGNOSIS — B34.9 VIRAL SYNDROME: Primary | ICD-10-CM

## 2022-09-19 LAB
ALBUMIN SERPL-MCNC: 4.6 G/DL (ref 3.5–5.2)
ALBUMIN/GLOB SERPL: 1.7 {RATIO} (ref 1.1–2.2)
ALP SERPL-CCNC: 94 U/L (ref 40–129)
ALT SERPL-CCNC: 76 U/L (ref 10–50)
ANION GAP SERPL CALC-SCNC: 12 MMOL/L (ref 5–15)
APPEARANCE UR: ABNORMAL
AST SERPL-CCNC: 77 U/L (ref 10–50)
BACTERIA URNS QL MICRO: NEGATIVE /HPF
BASOPHILS # BLD: 0 K/UL (ref 0–0.1)
BASOPHILS NFR BLD: 1 % (ref 0–1)
BILIRUB SERPL-MCNC: 0.8 MG/DL (ref 0.2–1)
BILIRUB UR QL CFM: NEGATIVE
BUN SERPL-MCNC: 11 MG/DL (ref 6–20)
BUN/CREAT SERPL: 11 (ref 12–20)
CALCIUM SERPL-MCNC: 9.3 MG/DL (ref 8.6–10)
CHLORIDE SERPL-SCNC: 99 MMOL/L (ref 98–107)
CO2 SERPL-SCNC: 26 MMOL/L (ref 22–29)
COLOR UR: ABNORMAL
CREAT SERPL-MCNC: 1.03 MG/DL (ref 0.7–1.2)
DIFFERENTIAL METHOD BLD: ABNORMAL
EOSINOPHIL # BLD: 0 K/UL (ref 0–0.4)
EOSINOPHIL NFR BLD: 0 % (ref 0–7)
EPITH CASTS URNS QL MICRO: NORMAL /LPF
ERYTHROCYTE [DISTWIDTH] IN BLOOD BY AUTOMATED COUNT: 12.2 % (ref 11.5–14.5)
GLOBULIN SER CALC-MCNC: 2.7 G/DL (ref 2–4)
GLUCOSE SERPL-MCNC: 219 MG/DL (ref 65–100)
GLUCOSE UR STRIP.AUTO-MCNC: NEGATIVE MG/DL
HCT VFR BLD AUTO: 40.7 % (ref 36.6–50.3)
HGB BLD-MCNC: 15 G/DL (ref 12.1–17)
HGB UR QL STRIP: NEGATIVE
IMM GRANULOCYTES # BLD AUTO: 0 K/UL (ref 0–0.04)
IMM GRANULOCYTES NFR BLD AUTO: 1 % (ref 0–0.5)
KETONES UR QL STRIP.AUTO: ABNORMAL MG/DL
LEUKOCYTE ESTERASE UR QL STRIP.AUTO: NEGATIVE
LIPASE SERPL-CCNC: 34 U/L (ref 13–60)
LYMPHOCYTES # BLD: 1.3 K/UL (ref 0.8–3.5)
LYMPHOCYTES NFR BLD: 31 % (ref 12–49)
MCH RBC QN AUTO: 31.2 PG (ref 26–34)
MCHC RBC AUTO-ENTMCNC: 36.9 G/DL (ref 30–36.5)
MCV RBC AUTO: 84.6 FL (ref 80–99)
MONOCYTES # BLD: 0.4 K/UL (ref 0–1)
MONOCYTES NFR BLD: 10 % (ref 5–13)
NEUTS SEG # BLD: 2.4 K/UL (ref 1.8–8)
NEUTS SEG NFR BLD: 58 % (ref 32–75)
NITRITE UR QL STRIP.AUTO: NEGATIVE
NRBC # BLD: 0 K/UL (ref 0–0.01)
NRBC BLD-RTO: 0 PER 100 WBC
PH UR STRIP: 5.5 [PH] (ref 5–8)
PLATELET # BLD AUTO: 148 K/UL (ref 150–400)
PMV BLD AUTO: 9.9 FL (ref 8.9–12.9)
POTASSIUM SERPL-SCNC: 4.2 MMOL/L (ref 3.5–5.1)
PROT SERPL-MCNC: 7.3 G/DL (ref 6.4–8.3)
PROT UR STRIP-MCNC: 30 MG/DL
RBC # BLD AUTO: 4.81 M/UL (ref 4.1–5.7)
RBC #/AREA URNS HPF: NORMAL /HPF (ref 0–5)
SODIUM SERPL-SCNC: 137 MMOL/L (ref 136–145)
SP GR UR REFRACTOMETRY: 1.02 (ref 1–1.03)
UR CULT HOLD, URHOLD: NORMAL
UROBILINOGEN UR QL STRIP.AUTO: 0.2 EU/DL (ref 0.2–1)
WBC # BLD AUTO: 4.1 K/UL (ref 4.1–11.1)
WBC URNS QL MICRO: NORMAL /HPF (ref 0–4)

## 2022-09-19 PROCEDURE — 74011250636 HC RX REV CODE- 250/636: Performed by: NURSE PRACTITIONER

## 2022-09-19 PROCEDURE — 99284 EMERGENCY DEPT VISIT MOD MDM: CPT

## 2022-09-19 PROCEDURE — 85025 COMPLETE CBC W/AUTO DIFF WBC: CPT

## 2022-09-19 PROCEDURE — 81001 URINALYSIS AUTO W/SCOPE: CPT

## 2022-09-19 PROCEDURE — 83690 ASSAY OF LIPASE: CPT

## 2022-09-19 PROCEDURE — 71046 X-RAY EXAM CHEST 2 VIEWS: CPT

## 2022-09-19 PROCEDURE — 36415 COLL VENOUS BLD VENIPUNCTURE: CPT

## 2022-09-19 PROCEDURE — 80053 COMPREHEN METABOLIC PANEL: CPT

## 2022-09-19 RX ADMIN — SODIUM CHLORIDE 1000 ML: 9 INJECTION, SOLUTION INTRAVENOUS at 13:55

## 2022-09-19 NOTE — ED TRIAGE NOTES
Patient reports fever of 103 since Friday. Has been seen multiple times at [facility] and had negative COVID and flu tests. Hx of afib and DM2. Endorses HA. Denies CP, SOB, abdominal pain, dysuria, cough, rash. Taking tylenol, temp 99.0 in triage.

## 2022-09-19 NOTE — ED PROVIDER NOTES
This is a 41-year-old male who presents ambulatory to the emergency room with complaints of an intermittent fever of 103 since Friday. Patient states he has been seen multiple times at [facility] and had negative COVID and flu test.  Comes to the emergency room today because [facility] states that they can no longer take blood work and referred them to \"[facility] but he came here instead. \"  Patient states he does have a history of A. fib and is on Eliquis. Also has a history of diabetes. Patient states this all started on Friday with some joint pain, took some Tylenol and symptoms resolved. Has followed quickly by intermittent fevers and overall feeling of malaise. He is denying any chest pain, shortness of breath, dizziness, abdominal pain. No urinary urgency, frequency, dysuria. No cough. Last bowel movement was today and was normal.  No nausea or vomiting. There are no further complaints at this time. None  Past Medical History:  2011: Atrial fibrillation ([facility] [address]      Comment:  ablation 2012  No date: Diabetes ([facility] [address]  No date: GERD (gastroesophageal reflux disease)  No date: Hypercholesterolemia  No date: Obesity (BMI 30.0-34.9)  No date: PUD (peptic ulcer disease)  No date: Stroke [facility])      Comment:  TIA. Past Surgical History:  4/25/2012: ABLATION OF SVT      Comment:     2005: EP STUDY W/INDUCTION      Comment:  at [facility]  4/25/2012: EP STUDY W/INDUCTION      Comment:     2005, 2011: HX GI      Comment:  Lap Nissen. No date: HX HEENT; Bilateral      Comment:  LASIK  No date: HX HEENT; Bilateral      Comment:  Blepharoplasty. 2016: HX HERNIA REPAIR; Bilateral      Comment:  Lap inguinal with mesh (Dr. Cristy French). 03/25/2021: HX HERNIA REPAIR; Right      Comment: 1. Right inguinal herniorrhaphy with mesh, 2. 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 No date: HX REFRACTIVE SURGERY  No date: HX UROLOGICAL; Right      Comment:  Undescended testicle.   4/25/2012: ISOPROTERENOL NON-COMP CON      Comment:     2012: MS INTRACARDIAC ELECTROPHYSIOLOGIC 3D MAPPING      Comment:     2018: MS UPPER GI ENDOSCOPY,REMOV F.B. Comment:            Past Medical History:   Diagnosis Date    Atrial fibrillation ([facility] [place] [address]     ablation     Diabetes ([facility] [place] [address]     GERD (gastroesophageal reflux disease)     Hypercholesterolemia     Obesity (BMI 30.0-34. 9)     PUD (peptic ulcer disease)     Stroke ([facility] [place] [address]     TIA. Past Surgical History:   Procedure Laterality Date    ABLATION OF SVT  2012         EP STUDY W/INDUCTION      at [facility]    EP STUDY W/INDUCTION  2012         HX GI  ,     Lap Nissen. HX HEENT Bilateral     LASIK    HX HEENT Bilateral     Blepharoplasty. HX HERNIA REPAIR Bilateral     Lap inguinal with mesh (Dr. Merly Wisdom). HX HERNIA REPAIR Right 2021     1. Right inguinal herniorrhaphy with mesh, 2. Excision of lipoma of the right spermatic cord. HX REFRACTIVE SURGERY      HX UROLOGICAL Right     Undescended testicle.     ISOPROTERENOL NON-COMP CON  2012         MS INTRACARDIAC ELECTROPHYSIOLOGIC 3D MAPPING  2012         MS UPPER GI ENDOSCOPY,REMOV F.B.  2018              Family History:   Problem Relation Age of Onset    Heart Disease Father     Diabetes Father     Cancer Father         skin    Thyroid Disease Mother        Social History     Socioeconomic History    Marital status:      Spouse name: Not on file    Number of children: Not on file    Years of education: Not on file    Highest education level: Not on file   Occupational History    Not on file   Tobacco Use    Smoking status: Former     Packs/day: 1.00     Years: 20.00     Pack years: 20.00     Types: Cigarettes     Quit date: 2017     Years since quittin.4    Smokeless tobacco: Current    Tobacco comments:     dips    Vaping Use    Vaping Use: Never used   Substance and Sexual Activity    Alcohol use: No    Drug use: No    Sexual activity: Not on file   Other Topics Concern    Not on file   Social History Narrative    Not on file     Social Determinants of Health     Financial Resource Strain: Not on file   Food Insecurity: Not on file   Transportation Needs: Not on file   Physical Activity: Not on file   Stress: Not on file   Social Connections: Not on file   Intimate Partner Violence: Not on file   Housing Stability: Not on file         ALLERGIES: Patient has no known allergies. Review of Systems   Constitutional:  Positive for fatigue and fever. Negative for activity change, appetite change and chills. General malaise     HENT:  Negative for congestion, ear discharge, ear pain, sinus pressure, sinus pain, sore throat and trouble swallowing. Eyes:  Negative for photophobia, pain, redness, itching and visual disturbance. Respiratory:  Negative for chest tightness and shortness of breath. Cardiovascular:  Negative for chest pain and palpitations. Gastrointestinal:  Negative for abdominal distention, abdominal pain, nausea and vomiting. Endocrine: Negative. Genitourinary:  Negative for difficulty urinating, frequency and urgency. Musculoskeletal:  Negative for back pain, neck pain and neck stiffness. Skin:  Negative for color change, pallor, rash and wound. Allergic/Immunologic: Negative. Neurological:  Negative for dizziness, syncope, weakness and headaches. Hematological:  Does not bruise/bleed easily. Psychiatric/Behavioral:  Negative for behavioral problems. The patient is not nervous/anxious. Vitals:    09/19/22 1325   BP: 138/84   Pulse: 88   Resp: 20   Temp: 99 °F (37.2 °C)   SpO2: 96%   Weight: 106.6 kg (235 lb)   Height: 6' 2\" (1.88 m)            Physical Exam  Vitals and nursing note reviewed. Constitutional:       General: He is not in acute distress. Appearance: Normal appearance. He is well-developed. He is not ill-appearing. HENT:      Head: Normocephalic and atraumatic.       Right Ear: External ear normal. Left Ear: External ear normal.      Nose: Nose normal. No congestion. Mouth/Throat:      Mouth: Mucous membranes are moist.   Eyes:      General:         Right eye: No discharge. Left eye: No discharge. Conjunctiva/sclera: Conjunctivae normal.      Pupils: Pupils are equal, round, and reactive to light. Neck:      Vascular: No JVD. Trachea: No tracheal deviation. Cardiovascular:      Rate and Rhythm: Normal rate and regular rhythm. Pulses: Normal pulses. Heart sounds: Normal heart sounds. No murmur heard. No gallop. Pulmonary:      Effort: Pulmonary effort is normal. No respiratory distress. Breath sounds: Normal breath sounds. Chest:      Chest wall: No tenderness. Abdominal:      General: Bowel sounds are normal. There is no distension. Palpations: Abdomen is soft. Tenderness: There is no abdominal tenderness. There is no guarding or rebound. Genitourinary:     Comments: Negative    Musculoskeletal:         General: No tenderness. Normal range of motion. Cervical back: Normal range of motion and neck supple. No tenderness. Skin:     General: Skin is warm and dry. Capillary Refill: Capillary refill takes less than 2 seconds. Coloration: Skin is not pale. Findings: No erythema or rash. Neurological:      General: No focal deficit present. Mental Status: He is alert and oriented to person, place, and time. Motor: No weakness. Coordination: Coordination normal.   Psychiatric:         Mood and Affect: Mood normal.         Behavior: Behavior normal.         Thought Content: Thought content normal.         Judgment: Judgment normal.        MDM  Number of Diagnoses or Management Options  Viral syndrome: new and requires workup  Diagnosis management comments: Differential diagnosis includes pneumonia, COVID-19, influenza and others.     After physical assessment and review of laboratory data and imaging, patient was reassessed and deemed stable for discharge. Patient was discharged home and will follow up with PCP. Return to the emergency room with worsening symptoms. Patient in agreement with plan of care. Amount and/or Complexity of Data Reviewed  Clinical lab tests: ordered and reviewed  Tests in the radiology section of CPT®: ordered and reviewed  Discuss the patient with other providers: yes (Dr. Radha Brewster  )         Labs Reviewed   CBC WITH AUTOMATED DIFF - Abnormal; Notable for the following components:       Result Value    MCHC 36.9 (*)     PLATELET 429 (*)     IMMATURE GRANULOCYTES 1 (*)     All other components within normal limits   METABOLIC PANEL, COMPREHENSIVE - Abnormal; Notable for the following components:    Glucose 219 (*)     BUN/Creatinine ratio 11 (*)     ALT (SGPT) 76 (*)     AST (SGOT) 77 (*)     All other components within normal limits   URINALYSIS W/ RFLX MICROSCOPIC - Abnormal; Notable for the following components:    Appearance CLOUDY (*)     Protein 30 (*)     Ketone TRACE (*)     All other components within normal limits   URINE CULTURE HOLD SAMPLE   LIPASE   BILIRUBIN, CONFIRM   URINE MICROSCOPIC ONLY   SAMPLES BEING HELD     XR CHEST PA LAT    Result Date: 9/19/2022  No acute cardiopulmonary disease. 4:28 PM  Pt has been reexamined. Pt has no new complaints, changes or physical findings. Care plan outlined and precautions discussed. All available results were reviewed with pt. All medications were reviewed with pt. All of pt's questions and concerns were addressed. Pt agrees to F/U as instructed and agrees to return to ED upon further deterioration. Pt is ready to go home. Ness Decker NP    Please note that this dictation was completed with Tap[employer]rap, the computer voice recognition software. Quite often unanticipated grammatical, syntax, homophones, and other interpretive errors are inadvertently transcribed by the computer software. Please disregard these errors.   Please excuse any errors that have escaped final proofreading. Thank you.     Procedures

## 2025-02-24 LAB
ALBUMIN URINE, EXTERNAL: 5
CREATININE, URINE, EXTERNAL: 59.4
MICROALBUMIN/CREAT UR: NORMAL MG/G{CREAT}

## 2025-03-22 SDOH — HEALTH STABILITY: PHYSICAL HEALTH: ON AVERAGE, HOW MANY DAYS PER WEEK DO YOU ENGAGE IN MODERATE TO STRENUOUS EXERCISE (LIKE A BRISK WALK)?: 0 DAYS

## 2025-03-24 ENCOUNTER — OFFICE VISIT (OUTPATIENT)
Facility: CLINIC | Age: 45
End: 2025-03-24
Payer: COMMERCIAL

## 2025-03-24 VITALS
SYSTOLIC BLOOD PRESSURE: 118 MMHG | BODY MASS INDEX: 30.12 KG/M2 | RESPIRATION RATE: 18 BRPM | HEART RATE: 86 BPM | OXYGEN SATURATION: 94 % | HEIGHT: 72 IN | WEIGHT: 222.4 LBS | TEMPERATURE: 98.3 F | DIASTOLIC BLOOD PRESSURE: 82 MMHG

## 2025-03-24 DIAGNOSIS — F41.9 ANXIETY: ICD-10-CM

## 2025-03-24 DIAGNOSIS — Z95.818 PRESENCE OF WATCHMAN LEFT ATRIAL APPENDAGE CLOSURE DEVICE: ICD-10-CM

## 2025-03-24 DIAGNOSIS — E78.00 HYPERCHOLESTEREMIA: ICD-10-CM

## 2025-03-24 DIAGNOSIS — Z76.89 ENCOUNTER TO ESTABLISH CARE: Primary | ICD-10-CM

## 2025-03-24 DIAGNOSIS — E11.9 DIABETES MELLITUS WITH INSULIN THERAPY (HCC): ICD-10-CM

## 2025-03-24 DIAGNOSIS — I48.0 PAF (PAROXYSMAL ATRIAL FIBRILLATION) (HCC): ICD-10-CM

## 2025-03-24 DIAGNOSIS — Z79.4 DIABETES MELLITUS WITH INSULIN THERAPY (HCC): ICD-10-CM

## 2025-03-24 PROCEDURE — 99204 OFFICE O/P NEW MOD 45 MIN: CPT | Performed by: FAMILY MEDICINE

## 2025-03-24 RX ORDER — INSULIN LISPRO 100 [IU]/ML
5 INJECTION, SOLUTION INTRAVENOUS; SUBCUTANEOUS
COMMUNITY
Start: 2025-02-24 | End: 2026-02-24

## 2025-03-24 RX ORDER — EMPAGLIFLOZIN 25 MG/1
25 TABLET, FILM COATED ORAL DAILY
COMMUNITY

## 2025-03-24 SDOH — ECONOMIC STABILITY: FOOD INSECURITY: WITHIN THE PAST 12 MONTHS, THE FOOD YOU BOUGHT JUST DIDN'T LAST AND YOU DIDN'T HAVE MONEY TO GET MORE.: NEVER TRUE

## 2025-03-24 SDOH — ECONOMIC STABILITY: FOOD INSECURITY: WITHIN THE PAST 12 MONTHS, YOU WORRIED THAT YOUR FOOD WOULD RUN OUT BEFORE YOU GOT MONEY TO BUY MORE.: NEVER TRUE

## 2025-03-24 ASSESSMENT — PATIENT HEALTH QUESTIONNAIRE - PHQ9
2. FEELING DOWN, DEPRESSED OR HOPELESS: NOT AT ALL
SUM OF ALL RESPONSES TO PHQ QUESTIONS 1-9: 0
1. LITTLE INTEREST OR PLEASURE IN DOING THINGS: NOT AT ALL

## 2025-03-24 NOTE — PROGRESS NOTES
Chief Complaint   Patient presents with    Establish Care      \"Have you been to the ER, urgent care clinic since your last visit?  Hospitalized since your last visit?\"    NO    “Have you seen or consulted any other health care providers outside of [facility] since your last visit?”    YES, endo and cardio      “Have you had a colorectal cancer screening such as a colonoscopy/FIT/Cologuard?    YES-[facility] 2023    No colonoscopy on file  No cologuard on file  No FIT/FOBT on file   No flexible sigmoidoscopy on file         Click Here for Release of Records Request     Health Maintenance Due   Topic Date Due    Diabetic foot exam  Never done    A1C test (Diabetic or Prediabetic)  Never done    Lipids  Never done    Depression Screen  Never done    HIV screen  Never done    Diabetic Alb to Cr ratio (uACR) test  Never done    Diabetic retinal exam  Never done    Hepatitis C screen  Never done    Hepatitis B vaccine (1 of 3 - 19+ 3-dose series) Never done    DTaP/Tdap/Td vaccine (1 - Tdap) Never done    Pneumococcal 0-49 years Vaccine (2 of 2 - PCV) 11/06/2018    GFR test (Diabetes, CKD 3-4, OR last GFR 15-59)  09/19/2023    Colorectal Cancer Screen  Never done    COVID-19 Vaccine (2 - 2024-25 season) 09/01/2024

## 2025-05-01 ENCOUNTER — OFFICE VISIT (OUTPATIENT)
Facility: CLINIC | Age: 45
End: 2025-05-01
Payer: OTHER GOVERNMENT

## 2025-05-01 VITALS
OXYGEN SATURATION: 98 % | TEMPERATURE: 98 F | DIASTOLIC BLOOD PRESSURE: 65 MMHG | WEIGHT: 222 LBS | RESPIRATION RATE: 18 BRPM | HEIGHT: 72 IN | SYSTOLIC BLOOD PRESSURE: 108 MMHG | HEART RATE: 83 BPM | BODY MASS INDEX: 30.07 KG/M2

## 2025-05-01 DIAGNOSIS — H90.A21 SENSORINEURAL HEARING LOSS (SNHL) OF RIGHT EAR WITH RESTRICTED HEARING OF LEFT EAR: Primary | ICD-10-CM

## 2025-05-01 DIAGNOSIS — J30.89 NON-SEASONAL ALLERGIC RHINITIS, UNSPECIFIED TRIGGER: ICD-10-CM

## 2025-05-01 PROCEDURE — 99214 OFFICE O/P EST MOD 30 MIN: CPT

## 2025-05-01 RX ORDER — PEN NEEDLE, DIABETIC 32GX 5/32"
NEEDLE, DISPOSABLE MISCELLANEOUS
COMMUNITY
Start: 2025-03-24

## 2025-05-01 RX ORDER — ASPIRIN 81 MG/1
81 TABLET ORAL DAILY
COMMUNITY
Start: 2024-10-25 | End: 2025-10-25

## 2025-05-01 RX ORDER — FLUTICASONE PROPIONATE 50 MCG
1 SPRAY, SUSPENSION (ML) NASAL DAILY
Qty: 16 G | Refills: 2 | Status: SHIPPED | OUTPATIENT
Start: 2025-05-01

## 2025-05-01 RX ORDER — ATORVASTATIN CALCIUM 20 MG/1
20 TABLET, FILM COATED ORAL DAILY
COMMUNITY
Start: 2025-03-06 | End: 2026-03-06

## 2025-05-01 RX ORDER — HYDROCHLOROTHIAZIDE 12.5 MG/1
CAPSULE ORAL
COMMUNITY
Start: 2025-03-24

## 2025-05-01 NOTE — PROGRESS NOTES
Chief Complaint   Patient presents with    Ear Fullness     X1 day duration. With ear pain.         \"Have you been to the ER, urgent care clinic since your last visit?  Hospitalized since your last visit?\"    YES- CARE NOW X2 WEEKS AGO, sore throat.    “Have you seen or consulted any other health care providers outside of [facility] since your last visit?”    NO          Click Here for Release of Records Request     Health Maintenance Due   Topic Date Due    Diabetic foot exam  Never done    Lipids  Never done    HIV screen  Never done    Diabetic Alb to Cr ratio (uACR) test  Never done    Diabetic retinal exam  Never done    Hepatitis C screen  Never done    Hepatitis B vaccine (1 of 3 - 19+ 3-dose series) Never done    DTaP/Tdap/Td vaccine (1 - Tdap) Never done    Pneumococcal 0-49 years Vaccine (2 of 2 - PCV) 11/06/2018    GFR test (Diabetes, CKD 3-4, OR last GFR 15-59)  09/19/2023    COVID-19 Vaccine (2 - 2024-25 season) 09/01/2024

## 2025-05-01 NOTE — PROGRESS NOTES
History of Present Illness:    Tj Faustin is a 44 y.o. male who presents for right ear fullness.    Reports waking up yesterday with feeling of right ear fullness with muffled hearing.  He tried to flush out his year using home kit but did not get much wax out  Has a h/o impacted cerumen requiring in-office removal     Reports having audiology screen last year. He was told he had decreased hearing in his left ear and was recommended to repeat testing in 3 years.     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      Physical Examination:     /65 (BP Site: Right Upper Arm, Patient Position: Sitting, BP Cuff Size: Large Adult)   Pulse 83   Temp 98 °F (36.7 °C) (Oral)   Resp 18   Ht 1.829 m (6')   Wt 100.7 kg (222 lb)   SpO2 98%   BMI 30.11 kg/m²     GEN: No apparent distress. Alert and oriented and responds to all questions appropriately.  EYES:  Conjunctiva clear; extraocular movements are intact  EAR: External ears are normal.  Tympanic membranes are clear and without effusion. No evidence of impacted cerumen. Rinne test abnormal on right. Mariscal test lateralizes to left ear.  LUNGS: Respirations unlabored   NEUROLOGIC:  No focal neurologic deficits. Coordination and gait grossly intact.   EXT: Well perfused. No edema.  SKIN: No obvious rashes.        Past Medical History:   Diagnosis Date    Allergic rhinitis     Anxiety     Atrial fibrillation (HCC) 2011    ablation 2012    Diabetes (HCC)     GERD (gastroesophageal reflux disease)     Hypercholesterolemia     Irritable bowel syndrome     Obesity (BMI 30.0-34.9)     PUD (peptic ulcer disease)     Stroke (HCC)     TIA.       Current Outpatient Medications   Medication Sig Dispense Refill    aspirin 81 MG EC tablet Take 1 tablet by mouth daily      atorvastatin (LIPITOR) 20 MG tablet Take 1 tablet by mouth daily      Continuous Glucose Sensor (FREESTYLE EWELINA 3 PLUS SENSOR) MISC USE AS DIRECTED AND CHANGE every 15 DAYS      BD PEN NEEDLE JACY 2ND

## 2025-07-09 ENCOUNTER — OFFICE VISIT (OUTPATIENT)
Facility: CLINIC | Age: 45
End: 2025-07-09
Payer: OTHER GOVERNMENT

## 2025-07-09 VITALS
WEIGHT: 224 LBS | HEIGHT: 72 IN | DIASTOLIC BLOOD PRESSURE: 77 MMHG | RESPIRATION RATE: 16 BRPM | SYSTOLIC BLOOD PRESSURE: 111 MMHG | OXYGEN SATURATION: 96 % | TEMPERATURE: 98.9 F | BODY MASS INDEX: 30.34 KG/M2 | HEART RATE: 52 BPM

## 2025-07-09 DIAGNOSIS — K59.1 FUNCTIONAL DIARRHEA: ICD-10-CM

## 2025-07-09 DIAGNOSIS — K59.00 CONSTIPATION, UNSPECIFIED CONSTIPATION TYPE: ICD-10-CM

## 2025-07-09 DIAGNOSIS — Z79.4 DIABETES MELLITUS WITH INSULIN THERAPY (HCC): Primary | ICD-10-CM

## 2025-07-09 DIAGNOSIS — K21.9 GASTROESOPHAGEAL REFLUX DISEASE WITHOUT ESOPHAGITIS: ICD-10-CM

## 2025-07-09 DIAGNOSIS — E11.9 DIABETES MELLITUS WITH INSULIN THERAPY (HCC): Primary | ICD-10-CM

## 2025-07-09 PROCEDURE — 99214 OFFICE O/P EST MOD 30 MIN: CPT | Performed by: FAMILY MEDICINE

## 2025-07-09 PROCEDURE — 3052F HG A1C>EQUAL 8.0%<EQUAL 9.0%: CPT | Performed by: FAMILY MEDICINE

## 2025-07-09 RX ORDER — POLYETHYLENE GLYCOL 3350 17 G/17G
17 POWDER, FOR SOLUTION ORAL DAILY PRN
Qty: 10 PACKET | Refills: 1 | Status: SHIPPED | OUTPATIENT
Start: 2025-07-09 | End: 2025-08-08

## 2025-07-09 RX ORDER — APIXABAN 5 MG/1
5 TABLET, FILM COATED ORAL 2 TIMES DAILY
COMMUNITY

## 2025-07-09 NOTE — PROGRESS NOTES
Chief Complaint   Patient presents with    Follow-up Chronic Condition         \"Have you been to the ER, urgent care clinic since your last visit?  Hospitalized since your last visit?\"    NO    “Have you seen or consulted any other health care providers outside of [facility] since your last visit?”    NO          Click Here for Release of Records Request     Health Maintenance Due   Topic Date Due    Diabetic foot exam  Never done    Lipids  Never done    HIV screen  Never done    Diabetic Alb to Cr ratio (uACR) test  Never done    Diabetic retinal exam  Never done    Hepatitis C screen  Never done    Hepatitis B vaccine (1 of 3 - 19+ 3-dose series) Never done    Pneumococcal 0-49 years Vaccine (2 of 2 - PCV) 11/06/2018    GFR test (Diabetes, CKD 3-4, OR last GFR 15-59)  09/19/2023    COVID-19 Vaccine (4 - 2024-25 season) 09/01/2024

## 2025-07-10 LAB
CREAT UR-MCNC: 57.4 MG/DL
MICROALBUMIN UR-MCNC: <0.5 MG/DL
MICROALBUMIN/CREAT UR-RTO: <9 MG/G (ref 0–30)

## 2025-07-16 NOTE — PROGRESS NOTES
Progress Note    Patient: Tj Faustin MRN: [id number]  SSN: [id number]    YOB: 1980  Age: 44 y.o.  Sex: male        Chief Complaint   Patient presents with    Follow-up Chronic Condition     he is a 44 y.o. year old male who presents with concern about frequent watery diarrhea. Patient with hx of constipation. He has pending appointment with GI. Patient with dx of T2D, HLD, PAF and anxiety. He is followed by cardiology, endocrinology and psychiatry. Patient denies HA, dizziness, SOB, CP, dysuria, acute myalgias or arthralgias. Discussed constipation and  overflow dirrhea      Encounter Diagnoses   Name Primary?    Diabetes mellitus with insulin therapy (HCC) Yes    Functional diarrhea     Constipation, unspecified constipation type     Gastroesophageal reflux disease without esophagitis      BP Readings from Last 3 Encounters:   07/09/25 111/77   05/01/25 108/65   03/24/25 118/82     Wt Readings from Last 3 Encounters:   07/09/25 101.6 kg (224 lb)   05/01/25 100.7 kg (222 lb)   03/24/25 100.9 kg (222 lb 6.4 oz)     Body mass index is 30.38 kg/m².    Patient Active Problem List   Diagnosis    Lipoma of spermatic cord    S/P right inguinal herniorrhaphy    PAF (paroxysmal atrial fibrillation) (HCC)    Symptomatic PVCs    SVT (supraventricular tachycardia)    Family history of coronary artery disease    S/P ablation of atrial fibrillation    Type II diabetes mellitus (HCC)    Hypercholesteremia    Palpitations    GERD (gastroesophageal reflux disease)     Past Surgical History:   Procedure Laterality Date    ABLATION OF SVT  4/25/2012         COLONOSCOPY      EP STUDY W/INDUCTION  4/25/2012         EP STUDY W/INDUCTION  2005    [place] [facility] [place]    EYE SURGERY      GI  2005, 2011    Lap Nissen.    HEENT Bilateral     Blepharoplasty.    HEENT Bilateral     LASIK    HERNIA REPAIR Right 03/25/2021     1.Right inguinal herniorrhaphy with mesh, 2.Excision of lipoma of the right spermatic cord.    HERNIA REPAIR

## (undated) DEVICE — NEEDLE HYPO 22GA L1.5IN BLK S STL HUB POLYPR SHLD REG BVL

## (undated) DEVICE — SUTURE VCRL SZ 2-0 L27IN ABSRB UD L26MM SH 1/2 CIR J417H

## (undated) DEVICE — SUTURE VCRL SZ 3-0 L27IN ABSRB UD L26MM SH 1/2 CIR J416H

## (undated) DEVICE — TOWEL SURG W17XL27IN STD BLU COT NONFENESTRATED PREWASHED

## (undated) DEVICE — ROCKER SWITCH PENCIL BLADE ELECTRODE, HOLSTER: Brand: EDGE

## (undated) DEVICE — 3M™ MEDIPORE™ H SOFT CLOTH TAPE SHORT ROLL TAPE 6INCHES X 2 YARDS 16 ROLLS/CASE 2866S: Brand: 3M™ MEDIPORE™

## (undated) DEVICE — STRAP,POSITIONING,KNEE/BODY,FOAM,4X60": Brand: MEDLINE

## (undated) DEVICE — SET ADMIN 16ML TBNG L100IN 2 Y INJ SITE IV PIGGY BK DISP

## (undated) DEVICE — STERILE POLYISOPRENE POWDER-FREE SURGICAL GLOVES: Brand: PROTEXIS

## (undated) DEVICE — BASIN EMSIS 16OZ GRAPHITE PLAS KID SHP MOLD GRAD FOR ORAL

## (undated) DEVICE — Device

## (undated) DEVICE — 1200 GUARD II KIT W/5MM TUBE W/O VAC TUBE: Brand: GUARDIAN

## (undated) DEVICE — DEVICE TRNSF SPIK STL 2008S] MICROTEK MEDICAL INC]

## (undated) DEVICE — TOWEL 4 PLY TISS 19X30 SUE WHT

## (undated) DEVICE — KENDALL RADIOLUCENT FOAM MONITORING ELECTRODE -RECTANGULAR SHAPE: Brand: KENDALL

## (undated) DEVICE — SOLIDIFIER MEDC 1200ML -- CONVERT TO 356117

## (undated) DEVICE — REM POLYHESIVE ADULT PATIENT RETURN ELECTRODE: Brand: VALLEYLAB

## (undated) DEVICE — Z DISCONTINUED NO SUB IDED CAPSULE PH GASTROENTEROLOGY ES MON FOR REFLX DEL SYS BRAVO

## (undated) DEVICE — Z DISCONTINUED PER MEDLINE LINE GAS SAMPLING O2/CO2 LNG AD 13 FT NSL W/ TBNG FILTERLINE

## (undated) DEVICE — 3M™ CUROS™ DISINFECTING CAP FOR NEEDLELESS CONNECTORS 270/CARTON 20 CARTONS/CASE CFF1-270: Brand: CUROS™

## (undated) DEVICE — 450 ML BOTTLE OF 0.05% CHLORHEXIDINE GLUCONATE IN 99.95% STERILE WATER FOR IRRIGATION, USP AND APPLICATOR.: Brand: IRRISEPT ANTIMICROBIAL WOUND LAVAGE

## (undated) DEVICE — CATH IV AUTOGRD BC PNK 20GA 25 -- INSYTE

## (undated) DEVICE — PREP SKN CHLRAPRP APL 26ML STR --

## (undated) DEVICE — BLOCK BITE ENDOSCP AD 21 MM W/ DIL BLU LF DISP

## (undated) DEVICE — PAD,NON-ADHERENT,3X8,STERILE,LF,1/PK: Brand: MEDLINE

## (undated) DEVICE — SOL IRRIGATION INJ NACL 0.9% 500ML BTL

## (undated) DEVICE — SUTURE PERMAHAND SZ 3-0 L30IN NONABSORBABLE BLK SILK BRAID A304H

## (undated) DEVICE — INTENDED FOR TISSUE SEPARATION, AND OTHER PROCEDURES THAT REQUIRE A SHARP SURGICAL BLADE TO PUNCTURE OR CUT.: Brand: BARD-PARKER ® CARBON RIB-BACK BLADES

## (undated) DEVICE — INFECTION CONTROL KIT SYS

## (undated) DEVICE — BAG BELONG PT PERS CLEAR HANDL

## (undated) DEVICE — CUFF BLD PRSS AD SZ 11 25-34CM LNG SFT 2 TB W/ M SCR CONN

## (undated) DEVICE — MEDI-VAC YANK SUCT HNDL W/TPRD BULBOUS TIP: Brand: CARDINAL HEALTH

## (undated) DEVICE — DRAIN SURG L18IN DIA025IN 100% SIL RADPQ FOR CLS WND DRNAGE

## (undated) DEVICE — NEONATAL-ADULT SPO2 SENSOR: Brand: NELLCOR

## (undated) DEVICE — SURGICAL PROCEDURE PACK BASIN MAJ SET CUST NO CAUT

## (undated) DEVICE — STRIP,CLOSURE,WOUND,MEDI-STRIP,1/2X4: Brand: MEDLINE

## (undated) DEVICE — KENDALL RADIOLUCENT FOAM MONITORING ELECTRODE RECTANGULAR SHAPE: Brand: KENDALL

## (undated) DEVICE — NEEDLE HYPO 18GA L1.5IN PNK S STL HUB POLYPR SHLD REG BVL

## (undated) DEVICE — SYR 10ML LUER LOK 1/5ML GRAD --

## (undated) DEVICE — SUTURE MCRYL SZ 4-0 L27IN ABSRB UD L19MM PS-2 1/2 CIR PRIM Y426H

## (undated) DEVICE — COVER LT HNDL PLAS RIG 1 PER PK

## (undated) DEVICE — PACK,LAPAROTOMY,2 REINFORCED GOWNS: Brand: MEDLINE

## (undated) DEVICE — SYR 3ML LL TIP 1/10ML GRAD --

## (undated) DEVICE — DRAPE,REIN 53X77,STERILE: Brand: MEDLINE